# Patient Record
Sex: FEMALE | Race: WHITE | Employment: OTHER | URBAN - METROPOLITAN AREA
[De-identification: names, ages, dates, MRNs, and addresses within clinical notes are randomized per-mention and may not be internally consistent; named-entity substitution may affect disease eponyms.]

---

## 2017-02-01 ENCOUNTER — TELEPHONE (OUTPATIENT)
Dept: INFECTIOUS DISEASES | Age: 57
End: 2017-02-01

## 2017-02-15 ENCOUNTER — TELEPHONE (OUTPATIENT)
Dept: INFECTIOUS DISEASES | Age: 57
End: 2017-02-15

## 2017-02-17 PROBLEM — M47.816 SPONDYLOSIS OF LUMBAR REGION WITHOUT MYELOPATHY OR RADICULOPATHY: Status: ACTIVE | Noted: 2017-02-17

## 2017-03-06 ENCOUNTER — HOSPITAL ENCOUNTER (OUTPATIENT)
Dept: MRI IMAGING | Age: 57
Discharge: HOME | End: 2017-03-06
Payer: COMMERCIAL

## 2017-03-06 DIAGNOSIS — M75.102 TEAR OF LEFT ROTATOR CUFF, UNSPECIFIED TEAR EXTENT: ICD-10-CM

## 2017-03-06 PROCEDURE — 73221 MRI JOINT UPR EXTREM W/O DYE: CPT

## 2017-03-17 ENCOUNTER — TELEPHONE (OUTPATIENT)
Dept: INFECTIOUS DISEASES | Age: 57
End: 2017-03-17

## 2017-03-28 ENCOUNTER — TELEPHONE (OUTPATIENT)
Dept: INFECTIOUS DISEASES | Age: 57
End: 2017-03-28

## 2017-03-29 PROBLEM — M75.42 IMPINGEMENT SYNDROME OF LEFT SHOULDER: Status: ACTIVE | Noted: 2017-03-29

## 2017-04-05 ENCOUNTER — TELEPHONE (OUTPATIENT)
Dept: INFECTIOUS DISEASES | Age: 57
End: 2017-04-05

## 2017-04-11 ENCOUNTER — OFFICE VISIT (OUTPATIENT)
Dept: INFECTIOUS DISEASES | Age: 57
End: 2017-04-11

## 2017-04-11 VITALS
SYSTOLIC BLOOD PRESSURE: 143 MMHG | HEART RATE: 86 BPM | WEIGHT: 261.8 LBS | BODY MASS INDEX: 46.39 KG/M2 | RESPIRATION RATE: 18 BRPM | TEMPERATURE: 98.6 F | DIASTOLIC BLOOD PRESSURE: 92 MMHG | HEIGHT: 63 IN

## 2017-04-11 DIAGNOSIS — Z22.322 MRSA (METHICILLIN RESISTANT STAPH AUREUS) CULTURE POSITIVE: ICD-10-CM

## 2017-04-11 DIAGNOSIS — M86.621 CHRONIC OSTEOMYELITIS OF RIGHT HUMERUS (HCC): Primary | ICD-10-CM

## 2017-04-11 PROCEDURE — 99213 OFFICE O/P EST LOW 20 MIN: CPT | Performed by: INTERNAL MEDICINE

## 2017-04-11 RX ORDER — AMITRIPTYLINE HYDROCHLORIDE 25 MG/1
25 TABLET, FILM COATED ORAL NIGHTLY PRN
COMMUNITY

## 2017-04-11 RX ORDER — MINOCYCLINE HYDROCHLORIDE 100 MG/1
100 TABLET ORAL 2 TIMES DAILY
Qty: 60 TABLET | Refills: 6 | Status: SHIPPED | OUTPATIENT
Start: 2017-04-11 | End: 2017-05-11

## 2017-04-11 ASSESSMENT — ENCOUNTER SYMPTOMS: RESPIRATORY NEGATIVE: 1

## 2017-05-08 ENCOUNTER — TELEPHONE (OUTPATIENT)
Dept: INFECTIOUS DISEASES | Age: 57
End: 2017-05-08

## 2017-05-09 ENCOUNTER — TELEPHONE (OUTPATIENT)
Dept: INFECTIOUS DISEASES | Age: 57
End: 2017-05-09

## 2019-01-05 ENCOUNTER — HOSPITAL ENCOUNTER (OUTPATIENT)
Dept: MRI IMAGING | Age: 59
Discharge: HOME | End: 2019-01-07
Payer: MEDICARE

## 2019-01-05 DIAGNOSIS — G43.909 MIGRAINE WITHOUT STATUS MIGRAINOSUS, NOT INTRACTABLE, UNSPECIFIED MIGRAINE TYPE: ICD-10-CM

## 2019-01-05 PROCEDURE — 70551 MRI BRAIN STEM W/O DYE: CPT

## 2023-02-20 LAB
ALANINE AMINOTRANSFERASE (SGPT) (U/L) IN SER/PLAS: 16 U/L (ref 7–45)
ALBUMIN (G/DL) IN SER/PLAS: 4 G/DL (ref 3.4–5)
ALKALINE PHOSPHATASE (U/L) IN SER/PLAS: 81 U/L (ref 33–136)
ANION GAP IN SER/PLAS: 11 MMOL/L (ref 10–20)
APPEARANCE, URINE: CLEAR
ASPARTATE AMINOTRANSFERASE (SGOT) (U/L) IN SER/PLAS: 14 U/L (ref 9–39)
BASOPHILS (10*3/UL) IN BLOOD BY AUTOMATED COUNT: 0.03 X10E9/L (ref 0–0.1)
BASOPHILS/100 LEUKOCYTES IN BLOOD BY AUTOMATED COUNT: 0.4 % (ref 0–2)
BILIRUBIN TOTAL (MG/DL) IN SER/PLAS: 0.5 MG/DL (ref 0–1.2)
BILIRUBIN, URINE: NEGATIVE
BLOOD, URINE: NEGATIVE
CALCIDIOL (25 OH VITAMIN D3) (NG/ML) IN SER/PLAS: 62 NG/ML
CALCIUM (MG/DL) IN SER/PLAS: 9 MG/DL (ref 8.6–10.3)
CARBON DIOXIDE, TOTAL (MMOL/L) IN SER/PLAS: 30 MMOL/L (ref 21–32)
CHLORIDE (MMOL/L) IN SER/PLAS: 105 MMOL/L (ref 98–107)
CHOLESTEROL (MG/DL) IN SER/PLAS: 142 MG/DL (ref 0–199)
CHOLESTEROL IN HDL (MG/DL) IN SER/PLAS: 46.4 MG/DL
CHOLESTEROL/HDL RATIO: 3.1
COLOR, URINE: YELLOW
CREATININE (MG/DL) IN SER/PLAS: 0.59 MG/DL (ref 0.5–1.05)
EOSINOPHILS (10*3/UL) IN BLOOD BY AUTOMATED COUNT: 0.08 X10E9/L (ref 0–0.7)
EOSINOPHILS/100 LEUKOCYTES IN BLOOD BY AUTOMATED COUNT: 1.2 % (ref 0–6)
ERYTHROCYTE DISTRIBUTION WIDTH (RATIO) BY AUTOMATED COUNT: 12.7 % (ref 11.5–14.5)
ERYTHROCYTE MEAN CORPUSCULAR HEMOGLOBIN CONCENTRATION (G/DL) BY AUTOMATED: 32.3 G/DL (ref 32–36)
ERYTHROCYTE MEAN CORPUSCULAR VOLUME (FL) BY AUTOMATED COUNT: 97 FL (ref 80–100)
ERYTHROCYTES (10*6/UL) IN BLOOD BY AUTOMATED COUNT: 4.51 X10E12/L (ref 4–5.2)
ESTIMATED AVERAGE GLUCOSE FOR HBA1C: 94 MG/DL
GFR FEMALE: >90 ML/MIN/1.73M2
GLUCOSE (MG/DL) IN SER/PLAS: 70 MG/DL (ref 74–99)
GLUCOSE, URINE: NEGATIVE MG/DL
HEMATOCRIT (%) IN BLOOD BY AUTOMATED COUNT: 43.9 % (ref 36–46)
HEMOGLOBIN (G/DL) IN BLOOD: 14.2 G/DL (ref 12–16)
HEMOGLOBIN A1C/HEMOGLOBIN TOTAL IN BLOOD: 4.9 %
IMMATURE GRANULOCYTES/100 LEUKOCYTES IN BLOOD BY AUTOMATED COUNT: 0.1 % (ref 0–0.9)
KETONES, URINE: NEGATIVE MG/DL
LDL: 71 MG/DL (ref 0–99)
LEUKOCYTE ESTERASE, URINE: NEGATIVE
LEUKOCYTES (10*3/UL) IN BLOOD BY AUTOMATED COUNT: 6.9 X10E9/L (ref 4.4–11.3)
LYMPHOCYTES (10*3/UL) IN BLOOD BY AUTOMATED COUNT: 2.36 X10E9/L (ref 1.2–4.8)
LYMPHOCYTES/100 LEUKOCYTES IN BLOOD BY AUTOMATED COUNT: 34.4 % (ref 13–44)
MAGNESIUM (MG/DL) IN SER/PLAS: 1.98 MG/DL (ref 1.6–2.4)
MONOCYTES (10*3/UL) IN BLOOD BY AUTOMATED COUNT: 0.48 X10E9/L (ref 0.1–1)
MONOCYTES/100 LEUKOCYTES IN BLOOD BY AUTOMATED COUNT: 7 % (ref 2–10)
NEUTROPHILS (10*3/UL) IN BLOOD BY AUTOMATED COUNT: 3.91 X10E9/L (ref 1.2–7.7)
NEUTROPHILS/100 LEUKOCYTES IN BLOOD BY AUTOMATED COUNT: 56.9 % (ref 40–80)
NITRITE, URINE: NEGATIVE
PH, URINE: 7 (ref 5–8)
PLATELETS (10*3/UL) IN BLOOD AUTOMATED COUNT: 220 X10E9/L (ref 150–450)
POTASSIUM (MMOL/L) IN SER/PLAS: 4 MMOL/L (ref 3.5–5.3)
PROTEIN TOTAL: 6.4 G/DL (ref 6.4–8.2)
PROTEIN, URINE: NEGATIVE MG/DL
SODIUM (MMOL/L) IN SER/PLAS: 142 MMOL/L (ref 136–145)
SPECIFIC GRAVITY, URINE: 1.01 (ref 1–1.03)
THYROTROPIN (MIU/L) IN SER/PLAS BY DETECTION LIMIT <= 0.05 MIU/L: 1.36 MIU/L (ref 0.44–3.98)
TRIGLYCERIDE (MG/DL) IN SER/PLAS: 125 MG/DL (ref 0–149)
UREA NITROGEN (MG/DL) IN SER/PLAS: 13 MG/DL (ref 6–23)
UROBILINOGEN, URINE: <2 MG/DL (ref 0–1.9)
VLDL: 25 MG/DL (ref 0–40)

## 2023-03-02 LAB
AMPHETAMINE (PRESENCE) IN URINE BY SCREEN METHOD: ABNORMAL
BARBITURATES PRESENCE IN URINE BY SCREEN METHOD: ABNORMAL
BENZODIAZEPINE (PRESENCE) IN URINE BY SCREEN METHOD: ABNORMAL
CANNABINOIDS IN URINE BY SCREEN METHOD: ABNORMAL
COCAINE (PRESENCE) IN URINE BY SCREEN METHOD: ABNORMAL
DRUG SCREEN COMMENT URINE: ABNORMAL
FENTANYL URINE: ABNORMAL
METHADONE (PRESENCE) IN URINE BY SCREEN METHOD: ABNORMAL
OPIATES (PRESENCE) IN URINE BY SCREEN METHOD: ABNORMAL
OXYCODONE (PRESENCE) IN URINE BY SCREEN METHOD: ABNORMAL
PHENCYCLIDINE (PRESENCE) IN URINE BY SCREEN METHOD: ABNORMAL

## 2023-03-07 LAB
6-ACETYLMORPHINE: <25 NG/ML
CODEINE: <50 NG/ML
HYDROCODONE: <25 NG/ML
HYDROMORPHONE: <25 NG/ML
MORPHINE URINE: <50 NG/ML
NORHYDROCODONE: <25 NG/ML
NOROXYCODONE: 510 NG/ML
OXYCODONE: 123 NG/ML
OXYMORPHONE: 26 NG/ML

## 2023-03-17 DIAGNOSIS — R52 PAIN: Primary | ICD-10-CM

## 2023-03-17 RX ORDER — LIDOCAINE 50 MG/G
PATCH TOPICAL
COMMUNITY
End: 2023-03-17 | Stop reason: SDUPTHER

## 2023-03-21 RX ORDER — LIDOCAINE 50 MG/G
PATCH TOPICAL
Qty: 30 PATCH | Refills: 1 | Status: SHIPPED | OUTPATIENT
Start: 2023-03-21 | End: 2023-03-24 | Stop reason: SDUPTHER

## 2023-03-24 DIAGNOSIS — R52 PAIN: ICD-10-CM

## 2023-03-26 RX ORDER — LIDOCAINE 50 MG/G
PATCH TOPICAL
Qty: 30 PATCH | Refills: 1 | Status: SHIPPED | OUTPATIENT
Start: 2023-03-26

## 2023-03-31 DIAGNOSIS — G62.9 NEUROPATHY: Primary | ICD-10-CM

## 2023-04-03 RX ORDER — GABAPENTIN 800 MG/1
TABLET ORAL
Qty: 270 TABLET | Refills: 3 | Status: SHIPPED | OUTPATIENT
Start: 2023-04-03 | End: 2024-02-12

## 2023-04-21 DIAGNOSIS — G89.29 OTHER CHRONIC PAIN: Primary | ICD-10-CM

## 2023-04-21 RX ORDER — OXYCODONE AND ACETAMINOPHEN 5; 325 MG/1; MG/1
1 TABLET ORAL EVERY 6 HOURS PRN
COMMUNITY
End: 2023-04-22 | Stop reason: DRUGHIGH

## 2023-04-21 RX ORDER — OXYCODONE AND ACETAMINOPHEN 5; 325 MG/1; MG/1
1 TABLET ORAL EVERY 6 HOURS PRN
Qty: 180 TABLET | Refills: 0 | Status: CANCELLED | OUTPATIENT
Start: 2023-04-21 | End: 2023-05-21

## 2023-04-22 RX ORDER — OXYCODONE AND ACETAMINOPHEN 5; 325 MG/1; MG/1
1 TABLET ORAL EVERY 4 HOURS PRN
Qty: 180 TABLET | Refills: 0 | Status: SHIPPED | OUTPATIENT
Start: 2023-04-22 | End: 2023-05-15 | Stop reason: SDUPTHER

## 2023-05-15 DIAGNOSIS — G89.29 OTHER CHRONIC PAIN: ICD-10-CM

## 2023-05-15 RX ORDER — OXYCODONE AND ACETAMINOPHEN 5; 325 MG/1; MG/1
1 TABLET ORAL EVERY 4 HOURS PRN
Qty: 180 TABLET | Refills: 0 | Status: SHIPPED | OUTPATIENT
Start: 2023-05-22 | End: 2023-06-12 | Stop reason: SDUPTHER

## 2023-06-05 ENCOUNTER — OFFICE VISIT (OUTPATIENT)
Dept: PRIMARY CARE | Facility: CLINIC | Age: 63
End: 2023-06-05
Payer: MEDICARE

## 2023-06-05 VITALS
BODY MASS INDEX: 24.36 KG/M2 | SYSTOLIC BLOOD PRESSURE: 152 MMHG | HEART RATE: 70 BPM | DIASTOLIC BLOOD PRESSURE: 84 MMHG | HEIGHT: 63 IN | OXYGEN SATURATION: 97 % | WEIGHT: 137.5 LBS

## 2023-06-05 DIAGNOSIS — E11.9 TYPE 2 DIABETES MELLITUS WITHOUT COMPLICATION, WITHOUT LONG-TERM CURRENT USE OF INSULIN (MULTI): ICD-10-CM

## 2023-06-05 DIAGNOSIS — G25.81 RLS (RESTLESS LEGS SYNDROME): ICD-10-CM

## 2023-06-05 DIAGNOSIS — I25.10 CORONARY ARTERY DISEASE INVOLVING NATIVE HEART WITHOUT ANGINA PECTORIS, UNSPECIFIED VESSEL OR LESION TYPE: ICD-10-CM

## 2023-06-05 DIAGNOSIS — F32.A DEPRESSION, UNSPECIFIED DEPRESSION TYPE: ICD-10-CM

## 2023-06-05 DIAGNOSIS — G89.29 OTHER CHRONIC PAIN: Primary | ICD-10-CM

## 2023-06-05 DIAGNOSIS — E55.9 VITAMIN D DEFICIENCY: ICD-10-CM

## 2023-06-05 PROCEDURE — 3044F HG A1C LEVEL LT 7.0%: CPT | Performed by: FAMILY MEDICINE

## 2023-06-05 PROCEDURE — 3079F DIAST BP 80-89 MM HG: CPT | Performed by: FAMILY MEDICINE

## 2023-06-05 PROCEDURE — 99214 OFFICE O/P EST MOD 30 MIN: CPT | Performed by: FAMILY MEDICINE

## 2023-06-05 PROCEDURE — 3077F SYST BP >= 140 MM HG: CPT | Performed by: FAMILY MEDICINE

## 2023-06-05 PROCEDURE — 3008F BODY MASS INDEX DOCD: CPT | Performed by: FAMILY MEDICINE

## 2023-06-05 RX ORDER — DULOXETIN HYDROCHLORIDE 60 MG/1
60 CAPSULE, DELAYED RELEASE ORAL DAILY
Qty: 90 CAPSULE | Refills: 1 | Status: SHIPPED | OUTPATIENT
Start: 2023-06-05 | End: 2023-10-17

## 2023-06-05 NOTE — PATIENT INSTRUCTIONS
Patient Discussion/Summary     Preventative visit next due in 2024.. We will do Medicare wellness later in the year.  March 2024 is next controlled substance contract and UDS.    -------  Screen for breast cancer, colon cancer. (Had hysterectomy around 2001). Had normal mammogram March 2022. -->>  Be sure to schedule your mammogram.    Patient had in the home colon cancer screening done summer 2021. Was told it was negative. Next one is due around summer 2024.     Hepatitis C screening was negative January 2022.     Need for immunizations. Patient is UTD on annual flu shot and had Tdap February 2022. Due for Shingles immunizations. UTD on coronavirus series, due for bivalent booster. -->> check with pharmacy to keep up-to-date on immunizations.  -------  Cardiac CT scoring was 113, over read was negative. -->> We will plan to aggressively manage risk factors including LDL below 70.      H/o Obesity, BMI 24, weight about the same as last time. Down from maximum of 283 February 20. Is post gastric bypass May 2021. -->> Keep up the most excellent work.     Chronic pain, right arm, low back, left shoulder, hip pain. R arm post many surgeries and injuries. Is now seeing pain management/Dr. Meza. Pain mgt asked us to continue prescribe Percocet while they work on other modalities. Pain management recently tried a new muscle relaxer for patient, she feels its very sedating, so at least it is helping with sleep. They are also talking about retrying a series of injections, starting in December also are discussing a nerve ablation. Is currently on 6 Percocet a day. Failed Voltaren gel (ineffective). Is on duloxetine, 60mg daily, looks like it was refilled months ago at the 30 mg dose. Finding it at least somewhat helpful. -->> Follow-up up with pain management as planned.  Also doing referral to [facility].  Also refilling duloxetine at the higher dose today.    New annual labs reviewed: Next annual labs  will be around February 2024. Around end of August or early September we will recheck vitamin D.  - Vitamin D deficiency, 62, was 81, 75, 57, 56, 67, 54, 49, 52, 42, 51, 25, 21, goal is . Post gastric bypass. Patient is currently on 100,000 units weekly, but had missed some for a while, is now back on her regular dose.   - type 2 diabetes, A1c is 4.9, was 5.2, 5.1, 6.1, 5.9, 6.3, 6.9, 5.8. Patient does know she has significantly cut back on sugar. No longer on Metformin. Will recheck A1c in 12 months  - Drug therapy -->> Plan to recheck annual labs around January 2022.   - Hyperlipidemia, HDL 46, was 41, 44, 40, 42, 38, 45, 41.3, goal is 45 or more. LDL or bad cholesterol, is 71, was 73, 137, 137, 136, 117, 128, 107, your goal is now 70 or less. -->> Keep up the good work. Patient is now post gastric bypass surgery.  -Dehydrated -->> drink more water especially before labs.     Depression, in general does well with sertraline. -->> Will refill as needed.     RLS, doing much better now on 1 mg ropinirole TID. Does feel it is helping more with the 3 times daily versus the bedtime only dosing. -->> We will refill as needed.     h/o CAD, abnormal EKG, PVCs.  Will be seeing Dr. Santana starting next month.  -->> f/up as planned.     Patient did quit smoking January 5, 2019. -->> Keep up the most excellent work.     - patient will return in about 3 months for CS RF and lab review.  -Return here about a week before the appointment to get vitamin D level drawn.

## 2023-06-05 NOTE — PROGRESS NOTES
Subjective   Patient ID: Bridget Hannon is a 62 y.o. female who presents for 3 Month CSRefill FU (Pt in today for her routine 3 month controlled substance refill FU).      Review of Systems  Denies N/V/D/C, no HA/S/V, denies rashes/lesions, no CP/SOB. Denies fevers/chills.  All other systems were negative.    Objective   Physical Exam  Gen: NAD  eyes: EOMI, PERRLA  ENT: hearing grossly intact, no nasal discharge  resp: CTABL, without R/R  heart: RRR without MRG  GI: abd: S/ND/NT, BS+  lymph: no axillary, cervical, supraclavicular lymphadenopathy noted   MS: gait grossly WNL,  derm: no rashes or lesions noted  neuro: CN II-XII grossly intact  psych: A&Ox3    Assessment/Plan   Diagnoses and all orders for this visit:  Other chronic pain  -     Referral to [facility]; Future  -     DULoxetine (Cymbalta) 60 mg DR capsule; Take 1 capsule (60 mg) by mouth once daily. Do not crush or chew.  Vitamin D deficiency  -     Vitamin D 25-Hydroxy,Total; Future  Type 2 diabetes mellitus without complication, without long-term current use of insulin (CMS/[facility])  Depression, unspecified depression type  RLS (restless legs syndrome)  Coronary artery disease involving native heart without angina pectoris, unspecified vessel or lesion type         Patient Discussion/Summary     Preventative visit next due in 2024.. We will do Medicare wellness later in the year.  March 2024 is next controlled substance contract and UDS.    -------  Screen for breast cancer, colon cancer. (Had hysterectomy around 2001). Had normal mammogram March 2022. -->>  Be sure to schedule your mammogram.    Patient had in the home colon cancer screening done summer 2021. Was told it was negative. Next one is due around summer 2024.     Hepatitis C screening was negative January 2022.     Need for immunizations. Patient is UTD on annual flu shot and had Tdap February 2022. Due for Shingles immunizations. UTD on coronavirus series, due for bivalent  booster. -->> check with pharmacy to keep up-to-date on immunizations.  -------  Cardiac CT scoring was 113, over read was negative. -->> We will plan to aggressively manage risk factors including LDL below 70.      H/o Obesity, BMI 24, weight about the same as last time. Down from maximum of 283 February 20. Is post gastric bypass May 2021. -->> Keep up the most excellent work.     Chronic pain, right arm, low back, left shoulder, hip pain. R arm post many surgeries and injuries. Is now seeing pain management/Dr. Meza. Pain mgt asked us to continue prescribe Percocet while they work on other modalities. Pain management recently tried a new muscle relaxer for patient, she feels its very sedating, so at least it is helping with sleep. They are also talking about retrying a series of injections, starting in December also are discussing a nerve ablation. Is currently on 6 Percocet a day. Failed Voltaren gel (ineffective). Is on duloxetine, 60mg daily, looks like it was refilled months ago at the 30 mg dose. Finding it at least somewhat helpful. -->> Follow-up up with pain management as planned.  Also doing referral to [facility].  Also refilling duloxetine at the higher dose today.    New annual labs reviewed: Next annual labs will be around February 2024. Around end of August or early September we will recheck vitamin D.  - Vitamin D deficiency, 62, was 81, 75, 57, 56, 67, 54, 49, 52, 42, 51, 25, 21, goal is . Post gastric bypass. Patient is currently on 100,000 units weekly, but had missed some for a while, is now back on her regular dose.   - type 2 diabetes, A1c is 4.9, was 5.2, 5.1, 6.1, 5.9, 6.3, 6.9, 5.8. Patient does know she has significantly cut back on sugar. No longer on Metformin. Will recheck A1c in 12 months  - Drug therapy -->> Plan to recheck annual labs around January 2022.   - Hyperlipidemia, HDL 46, was 41, 44, 40, 42, 38, 45, 41.3, goal is 45 or more. LDL or bad cholesterol,  is 71, was 73, 137, 137, 136, 117, 128, 107, your goal is now 70 or less. -->> Keep up the good work. Patient is now post gastric bypass surgery.  -Dehydrated -->> drink more water especially before labs.     Depression, in general does well with sertraline. -->> Will refill as needed.     RLS, doing much better now on 1 mg ropinirole TID. Does feel it is helping more with the 3 times daily versus the bedtime only dosing. -->> We will refill as needed.     h/o CAD, abnormal EKG, PVCs.  Will be seeing Dr. Santana starting next month.  -->> f/up as planned.     Patient did quit smoking January 5, 2019. -->> Keep up the most excellent work.     - patient will return in about 3 months for CS RF and lab review.  - Return here about a week before the appointment to get vitamin D level drawn.

## 2023-06-12 DIAGNOSIS — G89.29 OTHER CHRONIC PAIN: ICD-10-CM

## 2023-06-16 RX ORDER — OXYCODONE AND ACETAMINOPHEN 5; 325 MG/1; MG/1
1 TABLET ORAL EVERY 4 HOURS PRN
Qty: 180 TABLET | Refills: 0 | Status: SHIPPED | OUTPATIENT
Start: 2023-06-21 | End: 2023-07-11 | Stop reason: SDUPTHER

## 2023-07-08 DIAGNOSIS — I10 HYPERTENSION, UNSPECIFIED TYPE: Primary | ICD-10-CM

## 2023-07-10 RX ORDER — LOSARTAN POTASSIUM 50 MG/1
TABLET ORAL
Qty: 90 TABLET | Refills: 1 | Status: SHIPPED | OUTPATIENT
Start: 2023-07-10 | End: 2023-10-05

## 2023-07-11 ENCOUNTER — OFFICE VISIT (OUTPATIENT)
Dept: PRIMARY CARE | Facility: CLINIC | Age: 63
End: 2023-07-11
Payer: MEDICARE

## 2023-07-11 VITALS
OXYGEN SATURATION: 95 % | BODY MASS INDEX: 24.63 KG/M2 | DIASTOLIC BLOOD PRESSURE: 87 MMHG | HEART RATE: 78 BPM | SYSTOLIC BLOOD PRESSURE: 146 MMHG | HEIGHT: 63 IN | WEIGHT: 139 LBS

## 2023-07-11 DIAGNOSIS — G89.29 OTHER CHRONIC PAIN: ICD-10-CM

## 2023-07-11 DIAGNOSIS — H10.9 CONJUNCTIVITIS OF BOTH EYES, UNSPECIFIED CONJUNCTIVITIS TYPE: Primary | ICD-10-CM

## 2023-07-11 PROCEDURE — 99214 OFFICE O/P EST MOD 30 MIN: CPT | Performed by: FAMILY MEDICINE

## 2023-07-11 PROCEDURE — 3008F BODY MASS INDEX DOCD: CPT | Performed by: FAMILY MEDICINE

## 2023-07-11 RX ORDER — OXYCODONE AND ACETAMINOPHEN 5; 325 MG/1; MG/1
1 TABLET ORAL EVERY 4 HOURS PRN
Qty: 180 TABLET | Refills: 0 | Status: SHIPPED | OUTPATIENT
Start: 2023-07-21 | End: 2023-08-16 | Stop reason: SDUPTHER

## 2023-07-11 RX ORDER — TOBRAMYCIN 3 MG/ML
SOLUTION/ DROPS OPHTHALMIC
Qty: 5 ML | Refills: 1 | Status: SHIPPED | OUTPATIENT
Start: 2023-07-11

## 2023-07-11 RX ORDER — ERYTHROMYCIN 5 MG/G
OINTMENT OPHTHALMIC
Qty: 3.5 G | Refills: 1 | Status: SHIPPED | OUTPATIENT
Start: 2023-07-11

## 2023-07-11 NOTE — PATIENT INSTRUCTIONS
Bacterial conjunctivitis.  Started about 4 days ago, right eye, next day left eye was involved, is now waking with eyes crusted shut.  Has tried Visine allergy, and it helped a little. -->>  We will prescribe tobramycin eyedrops as well as erythromycin eye ointment.  Ointment can be used in the eyes or on the eyelids.    Management complicated by type 2 diabetes, however it has been very well controlled.    Chronic pain/pain management.  Due soon for refill, will refill Percocet for patient today.        Return in a few months as already scheduled.

## 2023-08-16 DIAGNOSIS — G89.29 OTHER CHRONIC PAIN: ICD-10-CM

## 2023-08-17 RX ORDER — OXYCODONE AND ACETAMINOPHEN 5; 325 MG/1; MG/1
1 TABLET ORAL EVERY 4 HOURS PRN
Qty: 180 TABLET | Refills: 0 | Status: SHIPPED | OUTPATIENT
Start: 2023-08-20 | End: 2023-09-06 | Stop reason: SDUPTHER

## 2023-08-28 ENCOUNTER — CLINICAL SUPPORT (OUTPATIENT)
Dept: PRIMARY CARE | Facility: CLINIC | Age: 63
End: 2023-08-28
Payer: MEDICARE

## 2023-08-28 DIAGNOSIS — E55.9 VITAMIN D DEFICIENCY: ICD-10-CM

## 2023-08-28 LAB — CALCIDIOL (25 OH VITAMIN D3) (NG/ML) IN SER/PLAS: 54 NG/ML

## 2023-08-28 PROCEDURE — 82306 VITAMIN D 25 HYDROXY: CPT

## 2023-08-28 NOTE — PROGRESS NOTES
Subjective   Patient ID: Bridget Hannon is a 62 y.o. female who presents for Labs Only (Pt in today for lab draw).    HPI     Review of Systems    Objective   There were no vitals taken for this visit.    Physical Exam    Assessment/Plan

## 2023-09-05 ENCOUNTER — APPOINTMENT (OUTPATIENT)
Dept: PRIMARY CARE | Facility: CLINIC | Age: 63
End: 2023-09-05
Payer: MEDICARE

## 2023-09-06 ENCOUNTER — OFFICE VISIT (OUTPATIENT)
Dept: PRIMARY CARE | Facility: CLINIC | Age: 63
End: 2023-09-06
Payer: MEDICARE

## 2023-09-06 VITALS
OXYGEN SATURATION: 93 % | HEART RATE: 75 BPM | SYSTOLIC BLOOD PRESSURE: 153 MMHG | DIASTOLIC BLOOD PRESSURE: 83 MMHG | WEIGHT: 144 LBS | BODY MASS INDEX: 25.52 KG/M2 | HEIGHT: 63 IN

## 2023-09-06 DIAGNOSIS — G25.81 RLS (RESTLESS LEGS SYNDROME): ICD-10-CM

## 2023-09-06 DIAGNOSIS — E11.9 TYPE 2 DIABETES MELLITUS WITHOUT COMPLICATION, WITHOUT LONG-TERM CURRENT USE OF INSULIN (MULTI): ICD-10-CM

## 2023-09-06 DIAGNOSIS — E55.9 VITAMIN D DEFICIENCY: Primary | ICD-10-CM

## 2023-09-06 DIAGNOSIS — F32.A DEPRESSION, UNSPECIFIED DEPRESSION TYPE: ICD-10-CM

## 2023-09-06 DIAGNOSIS — I25.10 CORONARY ARTERY DISEASE INVOLVING NATIVE HEART WITHOUT ANGINA PECTORIS, UNSPECIFIED VESSEL OR LESION TYPE: ICD-10-CM

## 2023-09-06 DIAGNOSIS — G89.29 OTHER CHRONIC PAIN: ICD-10-CM

## 2023-09-06 PROCEDURE — 99214 OFFICE O/P EST MOD 30 MIN: CPT | Performed by: FAMILY MEDICINE

## 2023-09-06 PROCEDURE — 4010F ACE/ARB THERAPY RXD/TAKEN: CPT | Performed by: FAMILY MEDICINE

## 2023-09-06 PROCEDURE — 3077F SYST BP >= 140 MM HG: CPT | Performed by: FAMILY MEDICINE

## 2023-09-06 PROCEDURE — 3044F HG A1C LEVEL LT 7.0%: CPT | Performed by: FAMILY MEDICINE

## 2023-09-06 PROCEDURE — 3079F DIAST BP 80-89 MM HG: CPT | Performed by: FAMILY MEDICINE

## 2023-09-06 RX ORDER — OXYCODONE AND ACETAMINOPHEN 5; 325 MG/1; MG/1
1 TABLET ORAL EVERY 4 HOURS PRN
Qty: 180 TABLET | Refills: 0 | Status: SHIPPED | OUTPATIENT
Start: 2023-09-19 | End: 2023-10-13 | Stop reason: SDUPTHER

## 2023-09-06 NOTE — PROGRESS NOTES
Subjective   Patient ID: Bridget Hannon is a 62 y.o. female who presents for Lab Review FU (Pt in today for routine lab review FU).      Review of Systems  Denies N/V/D/C, no HA/S/V, denies rashes/lesions, no CP/SOB. Denies fevers/chills.  All other systems were negative.    Objective   Physical Exam  Gen: NAD  eyes: EOMI, PERRLA  ENT: hearing grossly intact, no nasal discharge  resp: CTABL, without R/R  heart: RRR without MRG  GI: abd: S/ND/NT, BS+  lymph: no axillary, cervical, supraclavicular lymphadenopathy noted   MS: gait grossly WNL,  derm: no rashes or lesions noted  neuro: CN II-XII grossly intact  psych: A&Ox3    Assessment/Plan   Diagnoses and all orders for this visit:  Vitamin D deficiency  Type 2 diabetes mellitus without complication, without long-term current use of insulin (CMS/[facility])  Coronary artery disease involving native heart without angina pectoris, unspecified vessel or lesion type  Depression, unspecified depression type  RLS (restless legs syndrome)  Other chronic pain         Patient Discussion/Summary     Preventative visit next due in 2024.. We will do Medicare wellness later in the year.  March 2024 is next controlled substance contract and UDS.    -------  Screen for breast cancer, colon cancer. (Had hysterectomy around 2001). Had normal mammogram March 2022. -->>  Be sure to schedule your mammogram.    Patient had in the home colon cancer screening done summer 2021. Was told it was negative. Next one is due around summer 2024.     Hepatitis C screening was negative January 2022.     Need for immunizations. Patient is UTD on annual flu shot and had Tdap February 2022. Due for Shingles immunizations. UTD on coronavirus series, due for bivalent booster. -->> check with pharmacy to keep up-to-date on immunizations.    -------  Cardiac CT scoring was 113, over read was negative. -->> We will plan to aggressively manage risk factors including LDL below 70.      H/o Obesity, BMI 25, up  6 lb since last appt here. Down from maximum of 283 February 2020. Is post gastric bypass May 2021. -->> Keep up the most excellent work.     Chronic pain, right arm, low back, left shoulder, hip pain. R arm post many surgeries and injuries.  Had yet another surgery a month ago.  Had a revision of hardware, plate had broken. Is now seeing pain management/Dr. Meza. Pain mgt asked us to continue prescribe Percocet while they work on other modalities.  Has not seen them recently due to financial concerns.  Is currently on 6 Percocet a day. Failed Voltaren gel (ineffective). Is on duloxetine, 60mg daily, which seems to be at least a little better than the 30 mg. Finding it at least somewhat helpful. -->> Follow-up up with pain management as planned.  Has a referral to [facility].  Next controlled substance agreement and UDS is due around March 1, 2024.    New vit D reviewed: Next annual labs will be around February 2024.   - Vitamin D deficiency, 54, was 62, 81, 75, 57, 56, 67, 54, 49, 52, 42, 51, 25, 21, goal is . Post gastric bypass. Patient is currently on 100,000 units weekly,  -->> Increase to 3 pills a week for total of 150,000 units weekly.  We will recheck with next annual labs in about 6 months.    Regarding previous labs:  - type 2 diabetes, A1c is 4.9, was 5.2, 5.1, 6.1, 5.9, 6.3, 6.9, 5.8. Patient does know she has significantly cut back on sugar. No longer on Metformin. Will recheck A1c in 12 months  - Drug therapy -->> Plan to recheck annual labs around January 2022.   - Hyperlipidemia, HDL 46, was 41, 44, 40, 42, 38, 45, 41.3, goal is 45 or more. LDL or bad cholesterol, is 71, was 73, 137, 137, 136, 117, 128, 107, your goal is now 70 or less. -->> Keep up the good work. Patient is now post gastric bypass surgery.  -Dehydrated -->> drink more water especially before labs.     Depression, in general does well with sertraline. -->> Will refill as needed.     RLS, doing well 1 mg  ropinirole TID. -->> We will refill as needed.     h/o CAD, abnormal EKG, PVCs.  Saw Dr. Sanford.  Looks like he told patient does not need to follow-up unless she has any more problems.      Patient did quit smoking January 5, 2019. -->> Keep up the most excellent work.     - patient will return in about 3 months for CS RF and lab review.

## 2023-09-06 NOTE — PATIENT INSTRUCTIONS
Patient Discussion/Summary     Preventative visit next due in 2024.. We will do Medicare wellness later in the year.  March 2024 is next controlled substance contract and UDS.    -------  Screen for breast cancer, colon cancer. (Had hysterectomy around 2001). Had normal mammogram March 2022. -->>  Be sure to schedule your mammogram.    Patient had in the home colon cancer screening done summer 2021. Was told it was negative. Next one is due around summer 2024.     Hepatitis C screening was negative January 2022.     Need for immunizations. Patient is UTD on annual flu shot and had Tdap February 2022. Due for Shingles immunizations. UTD on coronavirus series, due for bivalent booster. -->> check with pharmacy to keep up-to-date on immunizations.  -------  Cardiac CT scoring was 113, over read was negative. -->> We will plan to aggressively manage risk factors including LDL below 70.      H/o Obesity, BMI 25, up 6 lb since last appt here. Down from maximum of 283 February 2020. Is post gastric bypass May 2021. -->> Keep up the most excellent work.     Chronic pain, right arm, low back, left shoulder, hip pain. R arm post many surgeries and injuries.  Had yet another surgery a month ago.  Had a revision of hardware, plate had broken. Is now seeing pain management/Dr. Meza. Pain mgt asked us to continue prescribe Percocet while they work on other modalities.  Has not seen them recently due to financial concerns.  Is currently on 6 Percocet a day. Failed Voltaren gel (ineffective). Is on duloxetine, 60mg daily, which seems to be at least a little better than the 30 mg. Finding it at least somewhat helpful. -->> Follow-up up with pain management as planned.  Has a referral to [facility].  Next controlled substance agreement and UDS is due around March 1, 2024.    New vit D reviewed: Next annual labs will be around February 2024.   - Vitamin D deficiency, 54, was 62, 81, 75, 57, 56, 67, 54, 49, 52, 42, 51,  25, 21, goal is . Post gastric bypass. Patient is currently on 100,000 units weekly,  -->> Increase to 3 pills a week for total of 150,000 units weekly.  We will recheck with next annual labs in about 6 months.    Regarding previous labs:  - type 2 diabetes, A1c is 4.9, was 5.2, 5.1, 6.1, 5.9, 6.3, 6.9, 5.8. Patient does know she has significantly cut back on sugar. No longer on Metformin. Will recheck A1c in 12 months  - Drug therapy -->> Plan to recheck annual labs around January 2022.   - Hyperlipidemia, HDL 46, was 41, 44, 40, 42, 38, 45, 41.3, goal is 45 or more. LDL or bad cholesterol, is 71, was 73, 137, 137, 136, 117, 128, 107, your goal is now 70 or less. -->> Keep up the good work. Patient is now post gastric bypass surgery.  -Dehydrated -->> drink more water especially before labs.     Depression, in general does well with sertraline. -->> Will refill as needed.     RLS, doing well 1 mg ropinirole TID. -->> We will refill as needed.     h/o CAD, abnormal EKG, PVCs.  Saw Dr. Sanford.  Looks like he told patient does not need to follow-up unless she has any more problems.      Patient did quit smoking January 5, 2019. -->> Keep up the most excellent work.     - patient will return in about 3 months for  RF and lab review.

## 2023-09-24 DIAGNOSIS — F32.A DEPRESSION, UNSPECIFIED DEPRESSION TYPE: Primary | ICD-10-CM

## 2023-09-25 RX ORDER — SERTRALINE HYDROCHLORIDE 100 MG/1
100 TABLET, FILM COATED ORAL DAILY
Qty: 90 TABLET | Refills: 3 | Status: SHIPPED | OUTPATIENT
Start: 2023-09-25

## 2023-10-01 PROBLEM — E66.1: Status: ACTIVE | Noted: 2023-10-01

## 2023-10-01 PROBLEM — G56.02 CARPAL TUNNEL SYNDROME OF LEFT WRIST: Status: ACTIVE | Noted: 2023-10-01

## 2023-10-01 PROBLEM — G89.29 CHRONIC LEFT-SIDED LOW BACK PAIN WITH LEFT-SIDED SCIATICA: Status: ACTIVE | Noted: 2017-05-15

## 2023-10-01 PROBLEM — G47.19 EXCESSIVE DAYTIME SLEEPINESS: Status: ACTIVE | Noted: 2023-10-01

## 2023-10-01 PROBLEM — G47.00 INSOMNIA: Status: ACTIVE | Noted: 2023-10-01

## 2023-10-01 PROBLEM — M25.60 MULTIPLE STIFF JOINTS: Status: ACTIVE | Noted: 2023-10-01

## 2023-10-01 PROBLEM — E78.5 HYPERLIPIDEMIA: Status: ACTIVE | Noted: 2023-10-01

## 2023-10-01 PROBLEM — R94.39 ABNORMAL NUCLEAR STRESS TEST: Status: ACTIVE | Noted: 2023-10-01

## 2023-10-01 PROBLEM — M12.59: Status: ACTIVE | Noted: 2023-10-01

## 2023-10-01 PROBLEM — G47.9 SLEEP DISTURBANCE: Status: ACTIVE | Noted: 2017-05-15

## 2023-10-01 PROBLEM — G43.109 MIGRAINE WITH AURA AND WITHOUT STATUS MIGRAINOSUS, NOT INTRACTABLE: Status: ACTIVE | Noted: 2023-10-01

## 2023-10-01 PROBLEM — G89.29 CHRONIC PAIN: Status: ACTIVE | Noted: 2023-10-01

## 2023-10-01 PROBLEM — M21.929: Status: ACTIVE | Noted: 2023-10-01

## 2023-10-01 PROBLEM — M47.816 SPONDYLOSIS OF LUMBAR REGION WITHOUT MYELOPATHY OR RADICULOPATHY: Status: ACTIVE | Noted: 2017-02-17

## 2023-10-01 PROBLEM — R94.31 ABNORMAL EKG: Status: ACTIVE | Noted: 2023-10-01

## 2023-10-01 PROBLEM — E66.01 PSYCHOLOGICAL FACTORS AFFECTING MORBID OBESITY (MULTI): Status: ACTIVE | Noted: 2023-10-01

## 2023-10-01 PROBLEM — G47.30 SLEEP APNEA: Status: ACTIVE | Noted: 2023-10-01

## 2023-10-01 PROBLEM — M86.431: Status: ACTIVE | Noted: 2023-10-01

## 2023-10-01 PROBLEM — M75.42 IMPINGEMENT SYNDROME OF LEFT SHOULDER: Status: ACTIVE | Noted: 2017-03-29

## 2023-10-01 PROBLEM — M54.9 CHRONIC BACK PAIN: Status: ACTIVE | Noted: 2023-10-01

## 2023-10-01 PROBLEM — G89.29 CHRONIC BACK PAIN: Status: ACTIVE | Noted: 2023-10-01

## 2023-10-01 PROBLEM — K91.2 POSTOPERATIVE MALABSORPTION (HHS-HCC): Status: ACTIVE | Noted: 2023-10-01

## 2023-10-01 PROBLEM — F54 PSYCHOLOGICAL FACTORS AFFECTING MORBID OBESITY (MULTI): Status: ACTIVE | Noted: 2023-10-01

## 2023-10-01 PROBLEM — I10 BENIGN ESSENTIAL HYPERTENSION: Status: ACTIVE | Noted: 2023-10-01

## 2023-10-01 PROBLEM — Z98.84 BARIATRIC SURGERY STATUS: Status: ACTIVE | Noted: 2023-10-01

## 2023-10-01 PROBLEM — Z98.84 S/P GASTRIC BYPASS: Status: ACTIVE | Noted: 2023-10-01

## 2023-10-01 PROBLEM — M54.42 CHRONIC LEFT-SIDED LOW BACK PAIN WITH LEFT-SIDED SCIATICA: Status: ACTIVE | Noted: 2017-05-15

## 2023-10-01 RX ORDER — VALSARTAN 160 MG/1
1 TABLET ORAL DAILY
COMMUNITY
Start: 2017-03-01

## 2023-10-01 RX ORDER — IBUPROFEN 600 MG/1
1 TABLET ORAL EVERY 6 HOURS PRN
COMMUNITY
Start: 2023-08-05

## 2023-10-01 RX ORDER — TRAMADOL HYDROCHLORIDE 50 MG/1
1 TABLET ORAL 2 TIMES DAILY PRN
COMMUNITY
Start: 2017-03-08

## 2023-10-01 RX ORDER — DOXYCYCLINE 100 MG/1
100 CAPSULE ORAL 2 TIMES DAILY
COMMUNITY
Start: 2022-06-15 | End: 2024-01-16 | Stop reason: SDUPTHER

## 2023-10-01 RX ORDER — OMEPRAZOLE 40 MG/1
1 CAPSULE, DELAYED RELEASE ORAL DAILY
COMMUNITY
Start: 2021-05-06

## 2023-10-01 RX ORDER — MELOXICAM 7.5 MG/1
1 TABLET ORAL DAILY PRN
COMMUNITY
Start: 2018-03-27

## 2023-10-01 RX ORDER — ROPINIROLE 1 MG/1
1 TABLET, FILM COATED ORAL 3 TIMES DAILY PRN
COMMUNITY
Start: 2021-07-20 | End: 2023-11-07

## 2023-10-01 RX ORDER — HYDROCHLOROTHIAZIDE 25 MG/1
1 TABLET ORAL DAILY
COMMUNITY
Start: 2017-01-17

## 2023-10-01 RX ORDER — ACETAMINOPHEN 325 MG/1
325 TABLET ORAL EVERY 6 HOURS PRN
COMMUNITY
Start: 2020-01-23

## 2023-10-01 RX ORDER — GABAPENTIN 600 MG/1
TABLET ORAL
COMMUNITY
Start: 2017-03-21 | End: 2024-02-12 | Stop reason: DRUGHIGH

## 2023-10-01 RX ORDER — METHOCARBAMOL 750 MG/1
TABLET, FILM COATED ORAL
COMMUNITY
Start: 2021-12-11

## 2023-10-01 RX ORDER — ALBUTEROL SULFATE 90 UG/1
2 AEROSOL, METERED RESPIRATORY (INHALATION)
COMMUNITY
Start: 2017-12-08

## 2023-10-01 RX ORDER — DICLOFENAC SODIUM 10 MG/G
4 GEL TOPICAL 4 TIMES DAILY
COMMUNITY
Start: 2017-03-22

## 2023-10-01 RX ORDER — CEFTRIAXONE SODIUM 10 G/100ML
INJECTION, POWDER, FOR SOLUTION INTRAVENOUS
COMMUNITY
Start: 2020-01-20 | End: 2024-01-16 | Stop reason: ALTCHOICE

## 2023-10-01 RX ORDER — ASPIRIN 325 MG
2 TABLET, DELAYED RELEASE (ENTERIC COATED) ORAL
COMMUNITY
End: 2024-02-26

## 2023-10-01 RX ORDER — TIZANIDINE 4 MG/1
4 TABLET ORAL NIGHTLY PRN
COMMUNITY
Start: 2023-02-09

## 2023-10-01 RX ORDER — DULOXETIN HYDROCHLORIDE 30 MG/1
1 CAPSULE, DELAYED RELEASE ORAL DAILY
COMMUNITY
Start: 2022-10-21 | End: 2023-10-17 | Stop reason: ALTCHOICE

## 2023-10-01 RX ORDER — AMITRIPTYLINE HYDROCHLORIDE 25 MG/1
25 TABLET, FILM COATED ORAL NIGHTLY PRN
COMMUNITY

## 2023-10-01 RX ORDER — TALC
3 POWDER (GRAM) TOPICAL NIGHTLY PRN
COMMUNITY
Start: 2017-04-25

## 2023-10-01 RX ORDER — ERGOCALCIFEROL 1.25 MG/1
2.5 CAPSULE ORAL
COMMUNITY

## 2023-10-01 RX ORDER — DICLOFENAC SODIUM 100 MG/1
100 TABLET, EXTENDED RELEASE ORAL DAILY PRN
COMMUNITY
Start: 2022-10-27

## 2023-10-01 RX ORDER — DICLOFENAC SODIUM 50 MG/1
TABLET, DELAYED RELEASE ORAL
COMMUNITY
Start: 2019-11-29

## 2023-10-03 ENCOUNTER — APPOINTMENT (OUTPATIENT)
Dept: ORTHOPEDIC SURGERY | Facility: CLINIC | Age: 63
End: 2023-10-03
Payer: MEDICARE

## 2023-10-05 ENCOUNTER — APPOINTMENT (OUTPATIENT)
Dept: OCCUPATIONAL THERAPY | Facility: CLINIC | Age: 63
End: 2023-10-05
Payer: MEDICARE

## 2023-10-05 DIAGNOSIS — I10 HYPERTENSION, UNSPECIFIED TYPE: ICD-10-CM

## 2023-10-05 RX ORDER — LOSARTAN POTASSIUM 50 MG/1
TABLET ORAL
Qty: 100 TABLET | Refills: 2 | Status: SHIPPED | OUTPATIENT
Start: 2023-10-05

## 2023-10-10 ENCOUNTER — APPOINTMENT (OUTPATIENT)
Dept: ORTHOPEDIC SURGERY | Facility: CLINIC | Age: 63
End: 2023-10-10
Payer: MEDICARE

## 2023-10-13 DIAGNOSIS — G89.29 OTHER CHRONIC PAIN: ICD-10-CM

## 2023-10-13 RX ORDER — OXYCODONE AND ACETAMINOPHEN 5; 325 MG/1; MG/1
1 TABLET ORAL EVERY 4 HOURS PRN
Qty: 180 TABLET | Refills: 0 | Status: SHIPPED | OUTPATIENT
Start: 2023-10-19 | End: 2023-11-17 | Stop reason: SDUPTHER

## 2023-10-16 DIAGNOSIS — G89.29 OTHER CHRONIC PAIN: ICD-10-CM

## 2023-10-17 ENCOUNTER — APPOINTMENT (OUTPATIENT)
Dept: ORTHOPEDIC SURGERY | Facility: CLINIC | Age: 63
End: 2023-10-17
Payer: MEDICARE

## 2023-10-17 RX ORDER — DULOXETIN HYDROCHLORIDE 60 MG/1
60 CAPSULE, DELAYED RELEASE ORAL DAILY
Qty: 90 CAPSULE | Refills: 3 | Status: SHIPPED | OUTPATIENT
Start: 2023-10-17

## 2023-10-20 ENCOUNTER — OFFICE VISIT (OUTPATIENT)
Dept: ORTHOPEDIC SURGERY | Facility: CLINIC | Age: 63
End: 2023-10-20
Payer: MEDICARE

## 2023-10-20 DIAGNOSIS — M65.30 TRIGGER FINGER, ACQUIRED: Primary | ICD-10-CM

## 2023-10-20 PROCEDURE — 20550 NJX 1 TENDON SHEATH/LIGAMENT: CPT | Performed by: ORTHOPAEDIC SURGERY

## 2023-10-20 PROCEDURE — 4010F ACE/ARB THERAPY RXD/TAKEN: CPT | Performed by: ORTHOPAEDIC SURGERY

## 2023-10-20 PROCEDURE — 3044F HG A1C LEVEL LT 7.0%: CPT | Performed by: ORTHOPAEDIC SURGERY

## 2023-10-20 RX ORDER — BETAMETHASONE SODIUM PHOSPHATE AND BETAMETHASONE ACETATE 3; 3 MG/ML; MG/ML
6 INJECTION, SUSPENSION INTRA-ARTICULAR; INTRALESIONAL; INTRAMUSCULAR; SOFT TISSUE
Status: COMPLETED | OUTPATIENT
Start: 2023-10-20 | End: 2023-10-20

## 2023-10-20 RX ORDER — LIDOCAINE HYDROCHLORIDE 10 MG/ML
1 INJECTION INFILTRATION; PERINEURAL
Status: COMPLETED | OUTPATIENT
Start: 2023-10-20 | End: 2023-10-20

## 2023-10-20 RX ADMIN — BETAMETHASONE SODIUM PHOSPHATE AND BETAMETHASONE ACETATE 6 MG: 3; 3 INJECTION, SUSPENSION INTRA-ARTICULAR; INTRALESIONAL; INTRAMUSCULAR; SOFT TISSUE at 10:07

## 2023-10-20 RX ADMIN — LIDOCAINE HYDROCHLORIDE 1 ML: 10 INJECTION INFILTRATION; PERINEURAL at 10:07

## 2023-10-20 NOTE — PROGRESS NOTES
Bridget is here for a left third trigger finger injection.    Hand / UE Inj/Asp for trigger finger on 10/20/2023 10:07 AM  Indications: pain  Details: 25 G needle, volar approach  Medications: 6 mg betamethasone acet,sod phos 6 mg/mL; 1 mL lidocaine 10 mg/mL (1 %)  Procedure, treatment alternatives, risks and benefits explained, specific risks discussed. Consent was given by the patient. Immediately prior to procedure a time out was called to verify the correct patient, procedure, equipment, support staff and site/side marked as required. Patient was prepped and draped in the usual sterile fashion.       She will follow-up over the next 6 weeks if not improving.

## 2023-11-06 DIAGNOSIS — G25.81 RLS (RESTLESS LEGS SYNDROME): Primary | ICD-10-CM

## 2023-11-06 DIAGNOSIS — G89.29 OTHER CHRONIC PAIN: ICD-10-CM

## 2023-11-07 RX ORDER — ROPINIROLE 1 MG/1
TABLET, FILM COATED ORAL
Qty: 300 TABLET | Refills: 2 | Status: SHIPPED | OUTPATIENT
Start: 2023-11-07

## 2023-11-15 ENCOUNTER — OFFICE VISIT (OUTPATIENT)
Dept: ORTHOPEDIC SURGERY | Facility: CLINIC | Age: 63
End: 2023-11-15
Payer: MEDICARE

## 2023-11-15 DIAGNOSIS — M65.4 DE QUERVAIN'S TENOSYNOVITIS: Primary | ICD-10-CM

## 2023-11-15 PROCEDURE — 4010F ACE/ARB THERAPY RXD/TAKEN: CPT | Performed by: ORTHOPAEDIC SURGERY

## 2023-11-15 PROCEDURE — 99214 OFFICE O/P EST MOD 30 MIN: CPT | Performed by: ORTHOPAEDIC SURGERY

## 2023-11-15 PROCEDURE — 3044F HG A1C LEVEL LT 7.0%: CPT | Performed by: ORTHOPAEDIC SURGERY

## 2023-11-15 NOTE — PROGRESS NOTES
History: Bridget is here for her left hand.  We did a trigger finger injection in the third digit which has helped.  She is no longer getting triggering.  Her biggest complaint is the thumb.  She had a bit of de Quervain's tenosynovitis previously but is now having much worse pain.  She has trouble gripping and doing activities.    Past medical history: Multiple  Medications: Multiple  Allergies: No known drug allergies    Please refer to the intake H&P regarding the patient's review of systems, family history and social history as was done today    HEENT: Normal  Lungs: Clear to auscultation  Heart: RRR  Abdomen: Soft, nontender  Skin: clear  Extremity: She has significant tenderness around the first extensor compartment.  She has a positive Finkelstein's maneuver as well.  Fairly mild pain with basal joint grind test.  No triggering around the third or fourth digits.  Minimal tenderness around the A1 pulley in those fingers.  No numbness or tingling.  Contralateral exam is normal for strength, motion, stability and neurovascular assessment.    Radiographs: Previous x-rays showed mild degenerative change only.    Assessment: Worsening left hand de Quervain's tenosynovitis, stable left third trigger finger injection    Plan: She is doing better from the trigger finger standpoint but her de Quervain's is gotten worse.  We discussed several options he would rather proceed with de Quervain's release.  Risk and benefits of surgical intervention were noted and we will see back preoperatively.  She will be in a splint for the first week followed by a brace for 2 to 3 weeks.  She has a history of chronic right arm deformity and we sent her to the elbow team for evaluation but they have referred her back to [facility].  She did not wish to go back downtown and will try to make other plans for her.  All questions were answered today with the patient.    This note was generated with voice recognition software and may contain  grammatical errors.

## 2023-11-17 RX ORDER — OXYCODONE AND ACETAMINOPHEN 5; 325 MG/1; MG/1
1 TABLET ORAL EVERY 4 HOURS PRN
Qty: 180 TABLET | Refills: 0 | Status: SHIPPED | OUTPATIENT
Start: 2023-11-18 | End: 2023-12-07 | Stop reason: SDUPTHER

## 2023-12-01 ENCOUNTER — OFFICE VISIT (OUTPATIENT)
Dept: ORTHOPEDIC SURGERY | Facility: CLINIC | Age: 63
End: 2023-12-01
Payer: MEDICARE

## 2023-12-01 DIAGNOSIS — M65.4 DE QUERVAIN'S TENOSYNOVITIS: Primary | ICD-10-CM

## 2023-12-01 PROCEDURE — L3809 WHFO W/O JOINTS PRE OTS: HCPCS | Performed by: ORTHOPAEDIC SURGERY

## 2023-12-01 PROCEDURE — 4010F ACE/ARB THERAPY RXD/TAKEN: CPT | Performed by: ORTHOPAEDIC SURGERY

## 2023-12-01 PROCEDURE — 3044F HG A1C LEVEL LT 7.0%: CPT | Performed by: ORTHOPAEDIC SURGERY

## 2023-12-01 PROCEDURE — 99214 OFFICE O/P EST MOD 30 MIN: CPT | Performed by: ORTHOPAEDIC SURGERY

## 2023-12-01 RX ORDER — BETAMETHASONE SODIUM PHOSPHATE AND BETAMETHASONE ACETATE 3; 3 MG/ML; MG/ML
6 INJECTION, SUSPENSION INTRA-ARTICULAR; INTRALESIONAL; INTRAMUSCULAR; SOFT TISSUE
Status: COMPLETED | OUTPATIENT
Start: 2023-12-01 | End: 2023-12-01

## 2023-12-01 RX ORDER — LIDOCAINE HYDROCHLORIDE 10 MG/ML
1 INJECTION INFILTRATION; PERINEURAL
Status: COMPLETED | OUTPATIENT
Start: 2023-12-01 | End: 2023-12-01

## 2023-12-01 RX ADMIN — LIDOCAINE HYDROCHLORIDE 1 ML: 10 INJECTION INFILTRATION; PERINEURAL at 08:28

## 2023-12-01 RX ADMIN — BETAMETHASONE SODIUM PHOSPHATE AND BETAMETHASONE ACETATE 6 MG: 3; 3 INJECTION, SUSPENSION INTRA-ARTICULAR; INTRALESIONAL; INTRAMUSCULAR; SOFT TISSUE at 08:28

## 2023-12-01 NOTE — PROGRESS NOTES
History: Bridget is here for her left wrist.  She has known de Quervain's tenosynovitis.  She is actually on the schedule for surgery but is not sure if she can wait due to the pain.    Past medical history: Multiple  Medications: Multiple  Allergies: No known drug allergies    Please refer to the intake H&P regarding the patient's review of systems, family history and social history as was done today    HEENT: Normal  Lungs: Clear to auscultation  Heart: RRR  Abdomen: Soft, nontender  Skin: clear  Extremity: She has tenderness around the first extensor compartment.  Positive Finkelstein's maneuver again today.  No redness or skin irritation.  No numbness.  Decent  strength and motion is maintained.  Contralateral exam is normal for strength, motion, stability and neurovascular assessment.    Radiographs: None today.  Previous x-ray showed mild degenerative change only.    Assessment: Worsening left wrist de Quervain's tenosynovitis    Plan: We again discussed options and she would rather try an injection today than wait for surgery in a few weeks.  She understands that she will need to wait 3 months for any surgery after the injection.  Will see her back over the next 6 weeks to assess progress if not improving.  Hand / UE Inj/Asp for trigger finger on 12/1/2023 8:28 AM  Indications: pain  Details: 25 G needle, volar approach  Medications: 6 mg betamethasone acet,sod phos 6 mg/mL; 1 mL lidocaine 10 mg/mL (1 %)  Outcome: tolerated well, no immediate complications    Injection into first extensor compartment not trigger thumb.  Procedure, treatment alternatives, risks and benefits explained, specific risks discussed. Consent was given by the patient. Immediately prior to procedure a time out was called to verify the correct patient, procedure, equipment, support staff and site/side marked as required. Patient was prepped and draped in the usual sterile fashion.          All questions were answered today with  the patient.    This note was generated with voice recognition software and may contain grammatical errors.

## 2023-12-07 ENCOUNTER — TELEMEDICINE (OUTPATIENT)
Dept: PRIMARY CARE | Facility: CLINIC | Age: 63
End: 2023-12-07
Payer: MEDICARE

## 2023-12-07 VITALS
WEIGHT: 142 LBS | OXYGEN SATURATION: 92 % | BODY MASS INDEX: 25.16 KG/M2 | DIASTOLIC BLOOD PRESSURE: 92 MMHG | SYSTOLIC BLOOD PRESSURE: 162 MMHG | HEIGHT: 63 IN | HEART RATE: 76 BPM

## 2023-12-07 DIAGNOSIS — E55.9 VITAMIN D DEFICIENCY: ICD-10-CM

## 2023-12-07 DIAGNOSIS — E11.9 TYPE 2 DIABETES MELLITUS WITHOUT COMPLICATION, WITHOUT LONG-TERM CURRENT USE OF INSULIN (MULTI): ICD-10-CM

## 2023-12-07 DIAGNOSIS — G89.4 CHRONIC PAIN SYNDROME: ICD-10-CM

## 2023-12-07 DIAGNOSIS — E78.5 HYPERLIPIDEMIA, UNSPECIFIED HYPERLIPIDEMIA TYPE: ICD-10-CM

## 2023-12-07 DIAGNOSIS — G89.29 OTHER CHRONIC PAIN: ICD-10-CM

## 2023-12-07 DIAGNOSIS — Z00.00 PREVENTATIVE HEALTH CARE: ICD-10-CM

## 2023-12-07 DIAGNOSIS — Z13.9 SCREENING FOR CONDITION: ICD-10-CM

## 2023-12-07 DIAGNOSIS — I10 BENIGN ESSENTIAL HYPERTENSION: ICD-10-CM

## 2023-12-07 DIAGNOSIS — Z98.84 BARIATRIC SURGERY STATUS: ICD-10-CM

## 2023-12-07 DIAGNOSIS — F32.2 MAJOR DEPRESSIVE DISORDER, SINGLE EPISODE, SEVERE WITHOUT PSYCHOTIC FEATURES (MULTI): ICD-10-CM

## 2023-12-07 DIAGNOSIS — Z79.899 DRUG THERAPY: Primary | ICD-10-CM

## 2023-12-07 DIAGNOSIS — I25.10 CORONARY ARTERY DISEASE INVOLVING NATIVE CORONARY ARTERY OF NATIVE HEART WITHOUT ANGINA PECTORIS: ICD-10-CM

## 2023-12-07 PROCEDURE — 99443 PR PHYS/QHP TELEPHONE EVALUATION 21-30 MIN: CPT | Performed by: FAMILY MEDICINE

## 2023-12-07 RX ORDER — NALOXONE HYDROCHLORIDE 4 MG/.1ML
4 SPRAY NASAL AS NEEDED
Qty: 2 EACH | Refills: 3 | Status: SHIPPED | OUTPATIENT
Start: 2023-12-07 | End: 2023-12-08

## 2023-12-07 RX ORDER — OXYCODONE AND ACETAMINOPHEN 5; 325 MG/1; MG/1
1 TABLET ORAL EVERY 4 HOURS PRN
Qty: 180 TABLET | Refills: 0 | Status: SHIPPED | OUTPATIENT
Start: 2023-12-18 | End: 2024-01-16 | Stop reason: SDUPTHER

## 2023-12-07 NOTE — PATIENT INSTRUCTIONS
Patient Discussion/Summary     Preventative visit next due in 2024. We will do Medicare wellness later in the year.  March 2024 is next controlled substance contract and UDS.     -------  Screen for breast cancer, colon cancer. (Had hysterectomy around 2001). Had normal mammogram March 2022. -->>  Be sure to schedule your mammogram.     Patient had in the home colon cancer screening done summer 2021. Was told it was negative. Next one is due around summer 2024.     Hepatitis C screening was negative January 2022.     Need for immunizations. Patient is UTD on annual flu shot and had Tdap February 2022. Due for Shingles immunizations. UTD on coronavirus series, due for bivalent booster. -->> check with pharmacy to keep up-to-date on immunizations.     -------  Cardiac CT scoring was 113, over read was negative. -->> We will plan to aggressively manage risk factors including LDL below 70.      H/o Obesity, BMI 25, down 2 lb since last appt here. Down from maximum of 283 February 2020. Is post gastric bypass May 2021. -->> Keep up the most excellent work.     Chronic pain, right arm, low back, left shoulder, hip pain. R arm post many surgeries and injuries. They are discussing L arm surgery. Seeing Dr. Brando GRIFFIN. Is now seeing pain management/Dr. Meza. Pain mgt asked us to continue prescribe Percocet while they work on other modalities.   Is currently on 6 Percocet a day. Failed Voltaren gel (ineffective). On duloxetine, 60mg daily, which seems to be at least a little better than the 30 mg. Finding it at least somewhat helpful. -->> Follow-up up with pain management as planned.  Has a referral to [facility].  Next controlled substance agreement and UDS is due around March 1, 2024.     Regarding previous labs: Next annual labs will be around February 2024.   - Vitamin D deficiency, 54, was 62, 81, 75, 57, 56, 67, 54, 49, 52, 42, 51, 25, 21, goal is . Post gastric bypass. Patient is currently on 100,000  units weekly,  -->> Increase to 3 pills a week for total of 150,000 units weekly.  We will recheck with next annual labs in about 6 months.  - type 2 diabetes, A1c is 4.9, was 5.2, 5.1, 6.1, 5.9, 6.3, 6.9, 5.8. Patient does know she has significantly cut back on sugar. No longer on Metformin. Will recheck A1c in 12 months  - Drug therapy -->> Plan to recheck annual labs around January 2022.   - Hyperlipidemia, HDL 46, was 41, 44, 40, 42, 38, 45, 41.3, goal is 45 or more. LDL or bad cholesterol, is 71, was 73, 137, 137, 136, 117, 128, 107, your goal is now 70 or less. -->> Keep up the good work. Patient is now post gastric bypass surgery.  -Dehydrated -->> drink more water especially before labs.     Depression, in general does well with sertraline. -->> Will refill as needed.     RLS, doing well 1 mg ropinirole TID. -->> We will refill as needed.     h/o CAD, abnormal EKG, PVCs.  Saw Dr. Sanford.  Looks like he told patient does not need to follow-up unless she has any more problems.       Patient did quit smoking January 5, 2019. -->> Keep up the most excellent work.     - patient will return in about 3 months for CSA, UDS, annual preventative exam, annual lab review.    -Patient will come here a week before the appointment to get fasting annual labs

## 2023-12-07 NOTE — PROGRESS NOTES
Subjective   Patient ID: Bridget Hannon is a 62 y.o. female who presents for 90 Day Refill FU (Pt in today for 90 Day refill FU).    Review of Systems  Denies N/V/D/C, no HA/S/V, denies rashes/lesions, no CP/SOB. Denies fevers/chills.  All other systems were negative.  Positive for chronic pain multiple locations.    Objective   Physical Exam  Gen: NAD  eyes: EOMI, PERRLA  ENT: hearing grossly intact, no nasal discharge  resp: CTABL, without R/R  heart: RRR without MRG  GI: abd: S/ND/NT, BS+  lymph: no axillary, cervical, supraclavicular lymphadenopathy noted   MS: gait grossly WNL,  derm: no rashes or lesions noted  neuro: CN II-XII grossly intact  psych: A&Ox3    Assessment/Plan   Diagnoses and all orders for this visit:  Drug therapy  -     naloxone (Narcan) 4 mg/0.1 mL nasal spray; Administer 1 spray (4 mg) into affected nostril(s) if needed for opioid reversal for up to 1 day. May repeat every 2-3 minutes if needed, alternating nostrils, until medical assistance becomes available.  -     Urinalysis with Reflex Microscopic; Future  -     CBC and Auto Differential; Future  -     Comprehensive Metabolic Panel; Future  -     Magnesium; Future  -     Urinalysis with Reflex Microscopic; Future  Other chronic pain  -     oxyCODONE-acetaminophen (Percocet) 5-325 mg tablet; Take 1 tablet by mouth every 4 hours if needed for severe pain (7 - 10). Do not start before December 18, 2023.  Screening for condition  -     TSH with reflex to Free T4 if abnormal; Future  -     TSH with reflex to Free T4 if abnormal; Future  -     Lipid Panel; Future  -     Hemoglobin A1C; Future  Type 2 diabetes mellitus without complication, without long-term current use of insulin (CMS/[facility])  -     Hemoglobin A1C; Future  Coronary artery disease involving native coronary artery of native heart without angina pectoris  Vitamin D deficiency  -     Vitamin D 25-Hydroxy,Total (for eval of Vitamin D levels); Future  Preventative health  care  -     TSH with reflex to Free T4 if abnormal; Future  -     Urinalysis with Reflex Microscopic; Future  -     CBC and Auto Differential; Future  -     Comprehensive Metabolic Panel; Future  -     TSH with reflex to Free T4 if abnormal; Future  -     Magnesium; Future  -     Lipid Panel; Future  -     Hemoglobin A1C; Future  -     Urinalysis with Reflex Microscopic; Future  -     Vitamin D 25-Hydroxy,Total (for eval of Vitamin D levels); Future  Bariatric surgery status  Benign essential hypertension  Hyperlipidemia, unspecified hyperlipidemia type  -     Lipid Panel; Future  Major depressive disorder, single episode, severe without psychotic features (CMS/HCC)  Chronic pain syndrome

## 2024-01-16 ENCOUNTER — TELEMEDICINE (OUTPATIENT)
Dept: PRIMARY CARE | Facility: CLINIC | Age: 64
End: 2024-01-16
Payer: MEDICARE

## 2024-01-16 DIAGNOSIS — J01.10 ACUTE NON-RECURRENT FRONTAL SINUSITIS: ICD-10-CM

## 2024-01-16 DIAGNOSIS — G89.29 OTHER CHRONIC PAIN: ICD-10-CM

## 2024-01-16 DIAGNOSIS — E11.9 TYPE 2 DIABETES MELLITUS WITHOUT COMPLICATION, WITHOUT LONG-TERM CURRENT USE OF INSULIN (MULTI): Primary | ICD-10-CM

## 2024-01-16 DIAGNOSIS — J20.9 ACUTE BRONCHITIS, UNSPECIFIED ORGANISM: ICD-10-CM

## 2024-01-16 PROCEDURE — 99214 OFFICE O/P EST MOD 30 MIN: CPT | Performed by: FAMILY MEDICINE

## 2024-01-16 RX ORDER — FLUTICASONE PROPIONATE 50 MCG
SPRAY, SUSPENSION (ML) NASAL
Qty: 16 G | Refills: 5 | Status: SHIPPED | OUTPATIENT
Start: 2024-01-16

## 2024-01-16 RX ORDER — DOXYCYCLINE 100 MG/1
CAPSULE ORAL
Qty: 20 CAPSULE | Refills: 0 | Status: SHIPPED | OUTPATIENT
Start: 2024-01-16

## 2024-01-16 RX ORDER — OXYCODONE AND ACETAMINOPHEN 5; 325 MG/1; MG/1
1 TABLET ORAL EVERY 4 HOURS PRN
Qty: 180 TABLET | Refills: 0 | Status: SHIPPED | OUTPATIENT
Start: 2024-01-17 | End: 2024-02-13 | Stop reason: SDUPTHER

## 2024-01-16 RX ORDER — GUAIFENESIN 600 MG/1
TABLET, EXTENDED RELEASE ORAL
Qty: 30 TABLET | Refills: 2 | Status: SHIPPED | OUTPATIENT
Start: 2024-01-16

## 2024-01-16 ASSESSMENT — ENCOUNTER SYMPTOMS
DIARRHEA: 0
ABDOMINAL PAIN: 0
VOMITING: 0
SORE THROAT: 0
NECK PAIN: 0
RHINORRHEA: 1
HEADACHES: 1
COUGH: 1

## 2024-01-16 NOTE — PROGRESS NOTES
Subjective   Patient ID: Bridget Hannon is a 63 y.o. female who presents for Virtual Visit (Pt being seen virtually for sick visit).      Review of Systems  Denies N/V/D/C, no S/V, denies rashes/lesions, no CP/SOB. Denies fevers/chills. (Was having fever a few days ago), positive for frontal headache, as well as ear discomfort.  All other systems were negative.    Objective   There were no vitals taken for this visit.    Physical Exam  Gen: NAD  eyes: EOMI,   ENT: hearing grossly intact, no nasal discharge  resp: breathing comfortably, no SOB noted  derm: no rashes or lesions noted  neuro: CN II-XII grossly intact  psych: A&Ox3, mood pleasant, affect appropriate, recent and remote memory grossly intact.    Assessment/Plan   Diagnoses and all orders for this visit:  Type 2 diabetes mellitus without complication, without long-term current use of insulin (CMS/[facility])  Other chronic pain  -     oxyCODONE-acetaminophen (Percocet) 5-325 mg tablet; Take 1 tablet by mouth every 4 hours if needed for severe pain (7 - 10). Do not start before January 17, 2024.  Acute non-recurrent frontal sinusitis  -     doxycycline (Monodox) 100 mg capsule; 1 by mouth twice daily with food for 10 days  -     guaiFENesin (Mucinex) 600 mg 12 hr tablet; 1 by mouth twice daily with large glass of water as needed to help cough up phlegm more easily.  Be sure to drink plenty of water throughout the day.  -     fluticasone (Flonase) 50 mcg/actuation nasal spray; 2 sprays each nostril twice daily for the first 3 days, thereafter 2 sprays each nostril once daily.  Acute bronchitis, unspecified organism  -     doxycycline (Monodox) 100 mg capsule; 1 by mouth twice daily with food for 10 days  -     guaiFENesin (Mucinex) 600 mg 12 hr tablet; 1 by mouth twice daily with large glass of water as needed to help cough up phlegm more easily.  Be sure to drink plenty of water throughout the day.  -     fluticasone (Flonase) 50 mcg/actuation nasal spray;  2 sprays each nostril twice daily for the first 3 days, thereafter 2 sprays each nostril once daily.        Answers submitted by the patient for this visit:  Ear Pain Questionnaire (Submitted on 1/16/2024)  Chief Complaint: Ear pain  Affected ear: right  Chronicity: new  Onset: yesterday  Progression since onset: rapidly worsening  Frequency: constantly  Fever: 100.4 - 100.9 F  Fever duration: 1 to 2 days  Pain - numeric: 3/10

## 2024-01-16 NOTE — PATIENT INSTRUCTIONS
Acute bronchitis, acute sinusitis, started about a week ago, started with sneezing and coughing, then moved onto headache with sinus congestion and ear discomfort.  Has been taking Tylenol Cold and sinus.  Anything else -->> will prescribe doxycycline, Mucinex, fluticasone nasal spray.    Type 2 diabetes, this is an additional complicating/comorbidity affecting patient's.  Making it even more important to prescribe antibiotics.    Chronic pain, is due for refill on Percocet. -->>  Refilled today.    Follow-up end of February, early March as already scheduled.

## 2024-01-18 DIAGNOSIS — G89.29 OTHER CHRONIC PAIN: ICD-10-CM

## 2024-01-19 RX ORDER — OXYCODONE AND ACETAMINOPHEN 5; 325 MG/1; MG/1
1 TABLET ORAL EVERY 4 HOURS PRN
Qty: 180 TABLET | Refills: 0 | OUTPATIENT
Start: 2024-01-19 | End: 2024-02-18

## 2024-02-10 DIAGNOSIS — G62.9 NEUROPATHY: ICD-10-CM

## 2024-02-12 RX ORDER — GABAPENTIN 800 MG/1
TABLET ORAL
Qty: 270 TABLET | Refills: 3 | Status: SHIPPED | OUTPATIENT
Start: 2024-02-12

## 2024-02-13 DIAGNOSIS — G89.29 OTHER CHRONIC PAIN: ICD-10-CM

## 2024-02-14 RX ORDER — OXYCODONE AND ACETAMINOPHEN 5; 325 MG/1; MG/1
1 TABLET ORAL EVERY 4 HOURS PRN
Qty: 180 TABLET | Refills: 0 | Status: SHIPPED | OUTPATIENT
Start: 2024-02-16 | End: 2024-03-06 | Stop reason: SDUPTHER

## 2024-02-24 DIAGNOSIS — E55.9 VITAMIN D DEFICIENCY, UNSPECIFIED: ICD-10-CM

## 2024-02-26 ENCOUNTER — APPOINTMENT (OUTPATIENT)
Dept: ORTHOPEDIC SURGERY | Facility: CLINIC | Age: 64
End: 2024-02-26
Payer: MEDICARE

## 2024-02-26 RX ORDER — ASPIRIN 325 MG
TABLET, DELAYED RELEASE (ENTERIC COATED) ORAL
Qty: 8 CAPSULE | Refills: 5 | Status: SHIPPED | OUTPATIENT
Start: 2024-02-26 | End: 2024-03-06 | Stop reason: SDUPTHER

## 2024-02-28 ENCOUNTER — APPOINTMENT (OUTPATIENT)
Dept: PRIMARY CARE | Facility: CLINIC | Age: 64
End: 2024-02-28
Payer: MEDICARE

## 2024-03-01 ENCOUNTER — CLINICAL SUPPORT (OUTPATIENT)
Dept: PRIMARY CARE | Facility: CLINIC | Age: 64
End: 2024-03-01
Payer: MEDICARE

## 2024-03-01 DIAGNOSIS — Z13.9 SCREENING FOR CONDITION: ICD-10-CM

## 2024-03-01 DIAGNOSIS — E11.9 TYPE 2 DIABETES MELLITUS WITHOUT COMPLICATION, WITHOUT LONG-TERM CURRENT USE OF INSULIN (MULTI): ICD-10-CM

## 2024-03-01 DIAGNOSIS — E55.9 VITAMIN D DEFICIENCY: ICD-10-CM

## 2024-03-01 DIAGNOSIS — Z00.00 PREVENTATIVE HEALTH CARE: ICD-10-CM

## 2024-03-01 DIAGNOSIS — Z79.899 DRUG THERAPY: ICD-10-CM

## 2024-03-01 DIAGNOSIS — E78.5 HYPERLIPIDEMIA, UNSPECIFIED HYPERLIPIDEMIA TYPE: ICD-10-CM

## 2024-03-01 LAB
25(OH)D3 SERPL-MCNC: 46 NG/ML (ref 30–100)
ALBUMIN SERPL BCP-MCNC: 4.2 G/DL (ref 3.4–5)
ALP SERPL-CCNC: 126 U/L (ref 33–136)
ALT SERPL W P-5'-P-CCNC: 15 U/L (ref 7–45)
ANION GAP SERPL CALC-SCNC: 13 MMOL/L (ref 10–20)
APPEARANCE UR: CLEAR
AST SERPL W P-5'-P-CCNC: 14 U/L (ref 9–39)
BACTERIA #/AREA URNS AUTO: ABNORMAL /HPF
BASOPHILS # BLD AUTO: 0.05 X10*3/UL (ref 0–0.1)
BASOPHILS NFR BLD AUTO: 0.6 %
BILIRUB SERPL-MCNC: 0.5 MG/DL (ref 0–1.2)
BILIRUB UR STRIP.AUTO-MCNC: NEGATIVE MG/DL
BUN SERPL-MCNC: 13 MG/DL (ref 6–23)
CALCIUM SERPL-MCNC: 9 MG/DL (ref 8.6–10.3)
CHLORIDE SERPL-SCNC: 106 MMOL/L (ref 98–107)
CHOLEST SERPL-MCNC: 138 MG/DL (ref 0–199)
CHOLESTEROL/HDL RATIO: 2.3
CO2 SERPL-SCNC: 27 MMOL/L (ref 21–32)
COLOR UR: YELLOW
CREAT SERPL-MCNC: 0.56 MG/DL (ref 0.5–1.05)
EGFRCR SERPLBLD CKD-EPI 2021: >90 ML/MIN/1.73M*2
EOSINOPHIL # BLD AUTO: 0.1 X10*3/UL (ref 0–0.7)
EOSINOPHIL NFR BLD AUTO: 1.1 %
ERYTHROCYTE [DISTWIDTH] IN BLOOD BY AUTOMATED COUNT: 12.1 % (ref 11.5–14.5)
GLUCOSE SERPL-MCNC: 83 MG/DL (ref 74–99)
GLUCOSE UR STRIP.AUTO-MCNC: NEGATIVE MG/DL
HCT VFR BLD AUTO: 45.5 % (ref 36–46)
HDLC SERPL-MCNC: 59.3 MG/DL
HGB BLD-MCNC: 15 G/DL (ref 12–16)
IMM GRANULOCYTES # BLD AUTO: 0.03 X10*3/UL (ref 0–0.7)
IMM GRANULOCYTES NFR BLD AUTO: 0.3 % (ref 0–0.9)
KETONES UR STRIP.AUTO-MCNC: NEGATIVE MG/DL
LDLC SERPL CALC-MCNC: 63 MG/DL
LEUKOCYTE ESTERASE UR QL STRIP.AUTO: NEGATIVE
LYMPHOCYTES # BLD AUTO: 2.93 X10*3/UL (ref 1.2–4.8)
LYMPHOCYTES NFR BLD AUTO: 33.1 %
MAGNESIUM SERPL-MCNC: 2.22 MG/DL (ref 1.6–2.4)
MCH RBC QN AUTO: 31.4 PG (ref 26–34)
MCHC RBC AUTO-ENTMCNC: 33 G/DL (ref 32–36)
MCV RBC AUTO: 95 FL (ref 80–100)
MONOCYTES # BLD AUTO: 0.7 X10*3/UL (ref 0.1–1)
MONOCYTES NFR BLD AUTO: 7.9 %
MUCOUS THREADS #/AREA URNS AUTO: ABNORMAL /LPF
NEUTROPHILS # BLD AUTO: 5.03 X10*3/UL (ref 1.2–7.7)
NEUTROPHILS NFR BLD AUTO: 57 %
NITRITE UR QL STRIP.AUTO: NEGATIVE
NON HDL CHOLESTEROL: 79 MG/DL (ref 0–149)
NRBC BLD-RTO: 0 /100 WBCS (ref 0–0)
PH UR STRIP.AUTO: 5 [PH]
PLATELET # BLD AUTO: 267 X10*3/UL (ref 150–450)
POTASSIUM SERPL-SCNC: 4.4 MMOL/L (ref 3.5–5.3)
PROT SERPL-MCNC: 6.6 G/DL (ref 6.4–8.2)
PROT UR STRIP.AUTO-MCNC: NEGATIVE MG/DL
RBC # BLD AUTO: 4.77 X10*6/UL (ref 4–5.2)
RBC # UR STRIP.AUTO: ABNORMAL /UL
RBC #/AREA URNS AUTO: ABNORMAL /HPF
SODIUM SERPL-SCNC: 142 MMOL/L (ref 136–145)
SP GR UR STRIP.AUTO: 1.02
SQUAMOUS #/AREA URNS AUTO: ABNORMAL /HPF
TRIGL SERPL-MCNC: 79 MG/DL (ref 0–149)
TSH SERPL-ACNC: 1.7 MIU/L (ref 0.44–3.98)
UROBILINOGEN UR STRIP.AUTO-MCNC: <2 MG/DL
VLDL: 16 MG/DL (ref 0–40)
WBC # BLD AUTO: 8.8 X10*3/UL (ref 4.4–11.3)
WBC #/AREA URNS AUTO: ABNORMAL /HPF

## 2024-03-01 PROCEDURE — 82306 VITAMIN D 25 HYDROXY: CPT

## 2024-03-01 PROCEDURE — 80061 LIPID PANEL: CPT

## 2024-03-01 PROCEDURE — 83036 HEMOGLOBIN GLYCOSYLATED A1C: CPT

## 2024-03-01 PROCEDURE — 36415 COLL VENOUS BLD VENIPUNCTURE: CPT

## 2024-03-01 PROCEDURE — 84443 ASSAY THYROID STIM HORMONE: CPT

## 2024-03-01 PROCEDURE — 85025 COMPLETE CBC W/AUTO DIFF WBC: CPT

## 2024-03-01 PROCEDURE — 80053 COMPREHEN METABOLIC PANEL: CPT

## 2024-03-01 PROCEDURE — 81001 URINALYSIS AUTO W/SCOPE: CPT

## 2024-03-01 PROCEDURE — 83735 ASSAY OF MAGNESIUM: CPT

## 2024-03-01 NOTE — PROGRESS NOTES
Subjective   Patient ID: Bridget Hannon is a 63 y.o. female who presents for Labs Only (Pt in today for lab draw).    HPI     Review of Systems    Objective   There were no vitals taken for this visit.    Physical Exam    Assessment/Plan

## 2024-03-02 LAB
EST. AVERAGE GLUCOSE BLD GHB EST-MCNC: 94 MG/DL
HBA1C MFR BLD: 4.9 %

## 2024-03-06 ENCOUNTER — OFFICE VISIT (OUTPATIENT)
Dept: PRIMARY CARE | Facility: CLINIC | Age: 64
End: 2024-03-06
Payer: MEDICARE

## 2024-03-06 VITALS
HEIGHT: 63 IN | SYSTOLIC BLOOD PRESSURE: 164 MMHG | HEART RATE: 77 BPM | OXYGEN SATURATION: 90 % | WEIGHT: 147 LBS | DIASTOLIC BLOOD PRESSURE: 103 MMHG | BODY MASS INDEX: 26.05 KG/M2

## 2024-03-06 DIAGNOSIS — Z13.9 SCREENING FOR CONDITION: ICD-10-CM

## 2024-03-06 DIAGNOSIS — G25.81 RLS (RESTLESS LEGS SYNDROME): ICD-10-CM

## 2024-03-06 DIAGNOSIS — E78.5 HYPERLIPIDEMIA, UNSPECIFIED HYPERLIPIDEMIA TYPE: ICD-10-CM

## 2024-03-06 DIAGNOSIS — M54.9 CHRONIC BACK PAIN, UNSPECIFIED BACK LOCATION, UNSPECIFIED BACK PAIN LATERALITY: ICD-10-CM

## 2024-03-06 DIAGNOSIS — Z79.899 DRUG THERAPY: ICD-10-CM

## 2024-03-06 DIAGNOSIS — R94.31 ABNORMAL EKG: ICD-10-CM

## 2024-03-06 DIAGNOSIS — G89.29 CHRONIC BACK PAIN, UNSPECIFIED BACK LOCATION, UNSPECIFIED BACK PAIN LATERALITY: ICD-10-CM

## 2024-03-06 DIAGNOSIS — Z98.84 BARIATRIC SURGERY STATUS: ICD-10-CM

## 2024-03-06 DIAGNOSIS — G89.29 OTHER CHRONIC PAIN: ICD-10-CM

## 2024-03-06 DIAGNOSIS — E55.9 VITAMIN D DEFICIENCY, UNSPECIFIED: ICD-10-CM

## 2024-03-06 DIAGNOSIS — I10 BENIGN ESSENTIAL HYPERTENSION: ICD-10-CM

## 2024-03-06 DIAGNOSIS — I25.10 CORONARY ARTERY DISEASE INVOLVING NATIVE CORONARY ARTERY OF NATIVE HEART WITHOUT ANGINA PECTORIS: ICD-10-CM

## 2024-03-06 DIAGNOSIS — E11.9 TYPE 2 DIABETES MELLITUS WITHOUT COMPLICATION, WITHOUT LONG-TERM CURRENT USE OF INSULIN (MULTI): Primary | ICD-10-CM

## 2024-03-06 DIAGNOSIS — G89.4 CHRONIC PAIN SYNDROME: ICD-10-CM

## 2024-03-06 DIAGNOSIS — F32.A DEPRESSION, UNSPECIFIED DEPRESSION TYPE: ICD-10-CM

## 2024-03-06 DIAGNOSIS — E55.9 VITAMIN D DEFICIENCY: ICD-10-CM

## 2024-03-06 LAB
AMPHETAMINES UR QL SCN: NORMAL
BARBITURATES UR QL SCN: NORMAL
BENZODIAZ UR QL SCN: NORMAL
BZE UR QL SCN: NORMAL
CANNABINOIDS UR QL SCN: NORMAL
FENTANYL+NORFENTANYL UR QL SCN: NORMAL
METHADONE UR QL SCN: NORMAL
OPIATES UR QL SCN: NORMAL
OXYCODONE+OXYMORPHONE UR QL SCN: NORMAL
PCP UR QL SCN: NORMAL

## 2024-03-06 PROCEDURE — 80307 DRUG TEST PRSMV CHEM ANLYZR: CPT

## 2024-03-06 PROCEDURE — 3048F LDL-C <100 MG/DL: CPT | Performed by: FAMILY MEDICINE

## 2024-03-06 PROCEDURE — 3077F SYST BP >= 140 MM HG: CPT | Performed by: FAMILY MEDICINE

## 2024-03-06 PROCEDURE — 3080F DIAST BP >= 90 MM HG: CPT | Performed by: FAMILY MEDICINE

## 2024-03-06 PROCEDURE — 3044F HG A1C LEVEL LT 7.0%: CPT | Performed by: FAMILY MEDICINE

## 2024-03-06 PROCEDURE — 99396 PREV VISIT EST AGE 40-64: CPT | Performed by: FAMILY MEDICINE

## 2024-03-06 PROCEDURE — 4010F ACE/ARB THERAPY RXD/TAKEN: CPT | Performed by: FAMILY MEDICINE

## 2024-03-06 PROCEDURE — 99214 OFFICE O/P EST MOD 30 MIN: CPT | Performed by: FAMILY MEDICINE

## 2024-03-06 RX ORDER — ASPIRIN 325 MG
TABLET, DELAYED RELEASE (ENTERIC COATED) ORAL
Qty: 12 CAPSULE | Refills: 5 | Status: SHIPPED | OUTPATIENT
Start: 2024-03-06

## 2024-03-06 RX ORDER — OXYCODONE AND ACETAMINOPHEN 5; 325 MG/1; MG/1
1 TABLET ORAL EVERY 4 HOURS PRN
Qty: 180 TABLET | Refills: 0 | Status: SHIPPED | OUTPATIENT
Start: 2024-03-17 | End: 2024-04-12 | Stop reason: SDUPTHER

## 2024-03-06 NOTE — PROGRESS NOTES
Subjective   Patient ID: Bridget Hannon is a 63 y.o. female who presents for 3 Month FU (Pt in today for routine 3 month FU).    Review of Systems  Denies N/V/D/C, no HA/S/V, denies rashes/lesions, no CP/SOB. Denies fevers/chills.  All other systems were negative.  Positive for chronic pain multiple locations.    Objective   Physical Exam  Gen: NAD  eyes: EOMI, PERRLA  ENT: hearing grossly intact, no nasal discharge  resp: CTABL, without R/R  heart: RRR without MRG  GI: abd: S/ND/NT, BS+  lymph: no axillary, cervical, supraclavicular lymphadenopathy noted   MS: gait grossly WNL,  derm: no rashes or lesions noted  neuro: CN II-XII grossly intact  psych: A&Ox3    Assessment/Plan   There are no diagnoses linked to this encounter.    Patient Discussion/Summary     March 2024: Doing CSA and UDS.  With annual lab review.  Around June will do annual preventative visit.  Around September will do Medicare wellness visit.       -------  Screen for breast cancer, colon cancer. (Had hysterectomy around 2001). Had normal mammogram March 2022. -->>  Be sure to schedule your mammogram.     Patient had in the home colon cancer screening done summer 2021. Was told it was negative. Next one is due around summer 2024.     Hepatitis C screening was negative January 2022.     Need for immunizations. Patient is due for annual flu shot and had Tdap February 2022. Due for Shingles immunizations. UTD on coronavirus series, due for bivalent booster. -->> check with pharmacy to keep up-to-date on immunizations.     -------  Cardiac CT scoring was 113, over read was negative. -->> We will plan to aggressively manage risk factors including LDL below 70.      H/o Obesity, BMI 26, Down from maximum of 283 February 2020. Is post gastric bypass May 2021. -->> UI     Chronic pain, right arm, low back, left shoulder, hip pain. R arm post many surgeries and injuries. They are discussing potential L arm surgery. Seeing Dr. Brando JARRETT Is now seeing  pain management/Dr. Meza. Pain mgt asked us to continue prescribe Percocet while they work on other modalities.   Is currently on 6 Percocet a day. Failed Voltaren gel (ineffective). On duloxetine, 60mg daily, which seems to be at least a little better than the 30 mg. Finding it at least somewhat helpful. -->> Follow-up up with pain management as planned.  Has a referral to [facility].  Did CSA and UDS today.  Could consider trying over-the-counter capsaicin topical to see if that helps her pain.      New annual labs reviewed:  Next annual labs will be around February-march 2025.   September recheck vitamin D,    -Dehydrated -->> drink more water especially before labs.    - Hyperlipidemia: Strahl numbers are excellent on no meds.-->> Keep up the excellent work. Patient is now post gastric bypass surgery.    - type 2 diabetes: A1c is 4.9, was 4.9, 5.2, 5.1, 6.1, 5.9, 6.3, 6.9, 5.8. Patient does note she has significantly cut back on sugar. No longer on Metformin. Will recheck A1c in 12 months    - Vitamin D deficiency, 46, was 54, 62, 81, 75, 57, 56, 67, 54, 49, 52, 42, 51, 25, 21, goal is . Post gastric bypass. Patient is currently on 100,000 units weekly. -->> Increase to 3 pills a week for total of 150,000 units weekly.  We will recheck in about 6 months.    - Drug therapy -->> Plan to recheck annual labs around January 2022.        Depression, in general does well with sertraline. -->> Will refill as needed.     RLS, doing well 1 mg ropinirole TID. -->> We will refill as needed.     h/o CAD, abnormal EKG, PVCs.  Has previously seen Dr. Sanford.  Looks like he told patient does not need to follow-up unless she has any more problems.       Patient did quit smoking January 5, 2019. -->> Keep up the most excellent work.           - Doing UDS today.  - patient will return in about 3 months for preventative visit and controlled substance refill.

## 2024-03-06 NOTE — PATIENT INSTRUCTIONS
Patient Discussion/Summary     March 2024: Doing CSA and UDS.  With annual lab review.  Around June will do annual preventative visit.  Around September will do Medicare wellness visit.       -------  Screen for breast cancer, colon cancer. (Had hysterectomy around 2001). Had normal mammogram March 2022. -->>  Be sure to schedule your mammogram.     Patient had in the home colon cancer screening done summer 2021. Was told it was negative. Next one is due around summer 2024.     Hepatitis C screening was negative January 2022.     Need for immunizations. Patient is due for annual flu shot and had Tdap February 2022. Due for Shingles immunizations. UTD on coronavirus series, due for bivalent booster. -->> check with pharmacy to keep up-to-date on immunizations.     -------  Cardiac CT scoring was 113, over read was negative. -->> We will plan to aggressively manage risk factors including LDL below 70.      H/o Obesity, BMI 26, Down from maximum of 283 February 2020. Is post gastric bypass May 2021. -->> UI     Chronic pain, right arm, low back, left shoulder, hip pain. R arm post many surgeries and injuries. They are discussing potential L arm surgery. Seeing Dr. Brando GRIFFIN. Is now seeing pain management/Dr. Meza. Pain mgt asked us to continue prescribe Percocet while they work on other modalities.   Is currently on 6 Percocet a day. Failed Voltaren gel (ineffective). On duloxetine, 60mg daily, which seems to be at least a little better than the 30 mg. Finding it at least somewhat helpful. -->> Follow-up up with pain management as planned.  Has a referral to [facility].  Did CSA and UDS today.  Could consider trying over-the-counter capsaicin topical to see if that helps her pain.      New annual labs reviewed:  Next annual labs will be around February-march 2025.   September recheck vitamin D,    -Dehydrated -->> drink more water especially before labs.    - Hyperlipidemia: Maye numbers are excellent on  no meds.-->> Keep up the excellent work. Patient is now post gastric bypass surgery.    - type 2 diabetes: A1c is 4.9, was 4.9, 5.2, 5.1, 6.1, 5.9, 6.3, 6.9, 5.8. Patient does note she has significantly cut back on sugar. No longer on Metformin. Will recheck A1c in 12 months    - Vitamin D deficiency, 46, was 54, 62, 81, 75, 57, 56, 67, 54, 49, 52, 42, 51, 25, 21, goal is . Post gastric bypass. Patient is currently on 100,000 units weekly. -->> Increase to 3 pills a week for total of 150,000 units weekly.  We will recheck in about 6 months.    - Drug therapy -->> Plan to recheck annual labs around January 2022.        Depression, in general does well with sertraline. -->> Will refill as needed.     RLS, doing well 1 mg ropinirole TID. -->> We will refill as needed.     h/o CAD, abnormal EKG, PVCs.  Has previously seen Dr. Sanford.  Looks like he told patient does not need to follow-up unless she has any more problems.       Patient did quit smoking January 5, 2019. -->> Keep up the most excellent work.           - Doing UDS today.  - patient will return in about 3 months for preventative visit and controlled substance refill.

## 2024-04-12 DIAGNOSIS — G89.29 OTHER CHRONIC PAIN: ICD-10-CM

## 2024-04-14 RX ORDER — OXYCODONE AND ACETAMINOPHEN 5; 325 MG/1; MG/1
1 TABLET ORAL EVERY 4 HOURS PRN
Qty: 180 TABLET | Refills: 0 | Status: SHIPPED | OUTPATIENT
Start: 2024-04-16 | End: 2024-05-15 | Stop reason: SDUPTHER

## 2024-04-29 ENCOUNTER — OFFICE VISIT (OUTPATIENT)
Dept: ORTHOPEDIC SURGERY | Facility: CLINIC | Age: 64
End: 2024-04-29
Payer: MEDICARE

## 2024-04-29 DIAGNOSIS — M25.50 MULTIPLE JOINT PAIN: ICD-10-CM

## 2024-04-29 PROCEDURE — 3044F HG A1C LEVEL LT 7.0%: CPT | Performed by: ORTHOPAEDIC SURGERY

## 2024-04-29 PROCEDURE — 99214 OFFICE O/P EST MOD 30 MIN: CPT | Performed by: ORTHOPAEDIC SURGERY

## 2024-04-29 PROCEDURE — 4010F ACE/ARB THERAPY RXD/TAKEN: CPT | Performed by: ORTHOPAEDIC SURGERY

## 2024-04-29 PROCEDURE — 3048F LDL-C <100 MG/DL: CPT | Performed by: ORTHOPAEDIC SURGERY

## 2024-04-29 RX ORDER — CELECOXIB 200 MG/1
200 CAPSULE ORAL DAILY
Qty: 30 CAPSULE | Refills: 2 | Status: SHIPPED | OUTPATIENT
Start: 2024-04-29 | End: 2024-07-28

## 2024-04-29 NOTE — PROGRESS NOTES
History: Bridget is here for her left hand.  We saw her in December for a de Quervain's injection which worked quite well.  Her newer complaint is pain and stiffness in the fingers.  She is having trouble bending the third fourth and fifth fingers significantly.  She denies any previous trauma or systemic issues.    Past medical history: Multiple  Medications: Multiple  Allergies: No known drug allergies    Please refer to the intake H&P regarding the patient's review of systems, family history and social history as was done today    HEENT: Normal  Lungs: Clear to auscultation  Heart: RRR  Abdomen: Soft, nontender  Skin: clear  Extremity: She has obvious swelling around the second third fourth and fifth MCP joints.  She cannot flex the MCP joints past 40 degrees in the PIP joints past 60 degrees.  She is having trouble making a fist with no rotational deformity.  There are some mild tenderness around the A1 pulley of the fourth finger but no triggering.  She does not have any pain around the first extensor compartment.  No pain with basal joint ballottement.  Contralateral exam is normal for strength, motion, stability and neurovascular assessment.    Radiographs: Prior x-rays showed mild degenerative changes throughout the carpus and digits.    Assessment: Left MCP joint pain and swelling with possible systemic arthropathy, improved left de Quervain's tenosynovitis status post injection trial    Plan:.  Perianal actually doing much better.  She has more significant swelling around the third fourth and fifth MCP joints and is now losing motion and .  At this point I recommended a lab workup with a rheumatoid panel as well as evaluation by the rheumatology team.    She is willing to try some Celebrex to help with the pain and function.  She will stop if it causes any stomach upset.  She has seen rheumatology evaluation if her workup is negative.  We could also consider referral to the hand team as  needed.  All questions were answered today with the patient.    This note was generated with voice recognition software and may contain grammatical errors.

## 2024-05-15 DIAGNOSIS — G89.29 OTHER CHRONIC PAIN: ICD-10-CM

## 2024-05-16 RX ORDER — OXYCODONE AND ACETAMINOPHEN 5; 325 MG/1; MG/1
1 TABLET ORAL EVERY 4 HOURS PRN
Qty: 180 TABLET | Refills: 0 | Status: SHIPPED | OUTPATIENT
Start: 2024-05-16 | End: 2024-06-15

## 2024-05-19 NOTE — PROGRESS NOTES
Subjective   Patient ID: Bridget Hannon is a 62 y.o. female who presents for Eye Issue (Pt in today with c/o eye issue, redness, itching. Not sure if pink eye or allergies.).    Review of Systems  Denies N/V/D/C, no HA/S/V, denies rashes/lesions, no CP/SOB. Denies fevers/chills.  Positive for bilateral conjunctival injection, waking up with eyes crusty.  All other systems were negative.    Objective   Physical Exam  Gen: NAD  eyes: EOMI, PERRLA, no eyelids puffy, scleral injection.  ENT: hearing grossly intact, no nasal discharge  resp: CTABL, without R/R  heart: RRR without MRG  GI: abd: S/ND/NT, BS+  lymph: no axillary, cervical, supraclavicular lymphadenopathy noted   MS: gait grossly WNL,  derm: no rashes or lesions noted  neuro: CN II-XII grossly intact  psych: A&Ox3    Assessment/Plan            Bacterial conjunctivitis.  Started about 4 days ago, right eye, next day left eye was involved, is now waking with eyes crusted shut.  Has tried Visine allergy, and it helped a little. -->>  We will prescribe tobramycin eyedrops as well as erythromycin eye ointment.  Ointment can be used in the eyes or on the eyelids.    Management complicated by type 2 diabetes, however it has been very well controlled.    Chronic pain/pain management.  Due soon for refill, will refill Percocet for patient today.        Return in a few months as already scheduled.   71

## 2024-05-24 ENCOUNTER — LAB (OUTPATIENT)
Dept: LAB | Facility: LAB | Age: 64
End: 2024-05-24
Payer: MEDICARE

## 2024-05-24 DIAGNOSIS — M25.50 MULTIPLE JOINT PAIN: ICD-10-CM

## 2024-05-24 LAB
CRP SERPL-MCNC: <0.1 MG/DL
ERYTHROCYTE [SEDIMENTATION RATE] IN BLOOD BY WESTERGREN METHOD: 5 MM/H (ref 0–30)
RHEUMATOID FACT SER NEPH-ACNC: 10 IU/ML (ref 0–15)
URATE SERPL-MCNC: 4.5 MG/DL (ref 2.3–6.7)

## 2024-05-24 PROCEDURE — 86140 C-REACTIVE PROTEIN: CPT

## 2024-05-24 PROCEDURE — 84550 ASSAY OF BLOOD/URIC ACID: CPT

## 2024-05-24 PROCEDURE — 86431 RHEUMATOID FACTOR QUANT: CPT

## 2024-05-24 PROCEDURE — 36415 COLL VENOUS BLD VENIPUNCTURE: CPT

## 2024-05-24 PROCEDURE — 85652 RBC SED RATE AUTOMATED: CPT

## 2024-05-24 PROCEDURE — 86038 ANTINUCLEAR ANTIBODIES: CPT

## 2024-05-28 LAB — ANA SER QL HEP2 SUBST: NEGATIVE

## 2024-06-05 ENCOUNTER — APPOINTMENT (OUTPATIENT)
Dept: PRIMARY CARE | Facility: CLINIC | Age: 64
End: 2024-06-05
Payer: MEDICARE

## 2024-06-14 ENCOUNTER — APPOINTMENT (OUTPATIENT)
Dept: PRIMARY CARE | Facility: CLINIC | Age: 64
End: 2024-06-14
Payer: MEDICARE

## 2024-06-14 VITALS
WEIGHT: 152 LBS | SYSTOLIC BLOOD PRESSURE: 155 MMHG | OXYGEN SATURATION: 92 % | HEART RATE: 78 BPM | DIASTOLIC BLOOD PRESSURE: 89 MMHG | HEIGHT: 63 IN | BODY MASS INDEX: 26.93 KG/M2

## 2024-06-14 DIAGNOSIS — Z79.899 DRUG THERAPY: ICD-10-CM

## 2024-06-14 DIAGNOSIS — M79.604 BILATERAL LEG PAIN: ICD-10-CM

## 2024-06-14 DIAGNOSIS — E55.9 VITAMIN D DEFICIENCY, UNSPECIFIED: ICD-10-CM

## 2024-06-14 DIAGNOSIS — Z12.11 ENCOUNTER FOR SCREENING FOR MALIGNANT NEOPLASM OF COLON: ICD-10-CM

## 2024-06-14 DIAGNOSIS — G89.29 OTHER CHRONIC PAIN: ICD-10-CM

## 2024-06-14 DIAGNOSIS — Z00.00 PREVENTATIVE HEALTH CARE: ICD-10-CM

## 2024-06-14 DIAGNOSIS — Z13.9 SCREENING FOR CONDITION: ICD-10-CM

## 2024-06-14 DIAGNOSIS — R29.898 BILATERAL LEG WEAKNESS: Primary | ICD-10-CM

## 2024-06-14 DIAGNOSIS — G25.81 RLS (RESTLESS LEGS SYNDROME): ICD-10-CM

## 2024-06-14 DIAGNOSIS — I25.10 CORONARY ARTERY DISEASE INVOLVING NATIVE CORONARY ARTERY OF NATIVE HEART WITHOUT ANGINA PECTORIS: ICD-10-CM

## 2024-06-14 DIAGNOSIS — M79.605 BILATERAL LEG PAIN: ICD-10-CM

## 2024-06-14 DIAGNOSIS — E78.5 HYPERLIPIDEMIA, UNSPECIFIED HYPERLIPIDEMIA TYPE: ICD-10-CM

## 2024-06-14 DIAGNOSIS — R94.31 ABNORMAL EKG: ICD-10-CM

## 2024-06-14 DIAGNOSIS — E11.9 TYPE 2 DIABETES MELLITUS WITHOUT COMPLICATION, WITHOUT LONG-TERM CURRENT USE OF INSULIN (MULTI): ICD-10-CM

## 2024-06-14 DIAGNOSIS — Z98.84 BARIATRIC SURGERY STATUS: ICD-10-CM

## 2024-06-14 DIAGNOSIS — F32.A DEPRESSION, UNSPECIFIED DEPRESSION TYPE: ICD-10-CM

## 2024-06-14 DIAGNOSIS — I10 BENIGN ESSENTIAL HYPERTENSION: ICD-10-CM

## 2024-06-14 DIAGNOSIS — G89.4 CHRONIC PAIN SYNDROME: ICD-10-CM

## 2024-06-14 PROBLEM — M86.431: Status: RESOLVED | Noted: 2023-10-01 | Resolved: 2024-06-14

## 2024-06-14 PROBLEM — E66.01 PSYCHOLOGICAL FACTORS AFFECTING MORBID OBESITY (MULTI): Status: RESOLVED | Noted: 2023-10-01 | Resolved: 2024-06-14

## 2024-06-14 PROBLEM — F54 PSYCHOLOGICAL FACTORS AFFECTING MORBID OBESITY (MULTI): Status: RESOLVED | Noted: 2023-10-01 | Resolved: 2024-06-14

## 2024-06-14 PROCEDURE — 4010F ACE/ARB THERAPY RXD/TAKEN: CPT | Performed by: FAMILY MEDICINE

## 2024-06-14 PROCEDURE — 99396 PREV VISIT EST AGE 40-64: CPT | Performed by: FAMILY MEDICINE

## 2024-06-14 PROCEDURE — 99214 OFFICE O/P EST MOD 30 MIN: CPT | Performed by: FAMILY MEDICINE

## 2024-06-14 PROCEDURE — 3079F DIAST BP 80-89 MM HG: CPT | Performed by: FAMILY MEDICINE

## 2024-06-14 PROCEDURE — 3048F LDL-C <100 MG/DL: CPT | Performed by: FAMILY MEDICINE

## 2024-06-14 PROCEDURE — 3044F HG A1C LEVEL LT 7.0%: CPT | Performed by: FAMILY MEDICINE

## 2024-06-14 PROCEDURE — 3077F SYST BP >= 140 MM HG: CPT | Performed by: FAMILY MEDICINE

## 2024-06-14 RX ORDER — OXYCODONE AND ACETAMINOPHEN 5; 325 MG/1; MG/1
1 TABLET ORAL EVERY 4 HOURS PRN
Qty: 180 TABLET | Refills: 0 | Status: SHIPPED | OUTPATIENT
Start: 2024-06-16 | End: 2024-07-16

## 2024-06-14 NOTE — PATIENT INSTRUCTIONS
March 2025: Doing CSA and UDS and annual lab.  Today (June) will do annual preventative visit.  Around September will do Medicare wellness visit.       -------  Screen for breast cancer:. (Had hysterectomy around 2001). Had normal mammogram March 2022. -->>  Be sure to schedule your mammogram.     Colon cancer screening: Patient had in the home colon cancer screening done summer 2021. Was told it was negative. -->>  Will order Cologuard.    Hepatitis C screening was negative January 2022.     Immunization counseling: Tdap February 2022. Due for Shingles immunizations.  Will be due for flu shot end of summer.  Due for latest coronavirus booster, new RSV shot. -->> check with pharmacy to keep up-to-date on immunizations.     Cardiac CT scoring was 113, over read was negative. -->> We will plan to aggressively manage risk factors including LDL below 70.     -------       H/o Obesity, BMI 26, Down from maximum of 283 February 2020. Is post gastric bypass May 2021. -->>  Keep up the excellent work.     Chronic pain, right arm, low back, left shoulder, hip pain. R arm post many surgeries and injuries. They are discussing potential L arm surgery. Seeing Dr. Brando GRIFFIN. Is now seeing pain management/Dr. Meza. Pain mgt asked us to continue prescribe Percocet while they work on other modalities.   Is currently on 6 Percocet a day. Failed Voltaren gel (ineffective). On duloxetine, 60mg daily, which seems to be at least a little better than the 30 mg. Finding it at least somewhat helpful.  Patient has been referred to rheumatology for evaluation of rheumatologic disease. -->> Follow-up up with pain management, orthopedics, rheumatology as planned. CSA/UDS next due march 2025. Could consider trying over-the-counter capsaicin topical to see if that helps her pain.      Leg weakness and pain.  Had episode of bilateral lower extremities total loss of strength for 2-3 minutes. Next day had B/L legs very achy/sore x 2-3 days.  Seems  resolved at this time.  No previous similar episodes. -->>  Refer to neurology for evaluation and treatment.       regarding previous labs:     Next annual labs will be around February-march 2025.   September recheck vitamin D,    -Dehydrated -->> drink more water especially before labs.    - Hyperlipidemia: Strahl numbers are excellent on no meds.-->> Keep up the excellent work. Patient is now post gastric bypass surgery.    - type 2 diabetes: A1c is 4.9, was 4.9, 5.2, 5.1, 6.1, 5.9, 6.3, 6.9, 5.8. Patient does note she has significantly cut back on sugar. No longer on Metformin. Will recheck A1c in 12 months    - Vitamin D deficiency, 46, was 54, 62, 81, 75, 57, 56, 67, 54, 49, 52, 42, 51, 25, 21, goal is . Post gastric bypass. Patient is currently on 100,000 units weekly. -->> Increase to 3 pills a week for total of 150,000 units weekly.  We will recheck in about 6 months.    - Drug therapy -->> Plan to recheck annual labs around January 2022.      Depression, in general does well with sertraline. -->> Will refill as needed.     RLS, doing well 1 mg ropinirole TID. -->> We will refill as needed.     h/o CAD, abnormal EKG, PVCs.  Has previously seen Dr. Sanford.  Looks like he told patient does not need to follow-up unless she has any more problems.       Patient did quit smoking January 5, 2019. -->> Keep up the most excellent work.      - patient will return in about 3 months for [facility] and lab review and controlled substance refill.  -Patient will come in about a week before the appointment to get vitamin D level drawn.

## 2024-06-14 NOTE — PROGRESS NOTES
Subjective   Patient ID: Bridget Hannon is a 63 y.o. female who presents for 3 Month FU (Pt in today for 3 month FU).    Review of Systems   Respiratory:  Choking: bilateral leg pain.      Denies N/V/D/C, no HA/S/V, denies rashes/lesions, no CP/SOB. Denies fevers/chills.  All other systems were negative.  Positive for chronic pain multiple locations.    Objective   Physical Exam  Gen: NAD  eyes: EOMI, PERRLA  ENT: hearing grossly intact, no nasal discharge  resp: CTABL, without R/R  heart: RRR without MRG  GI: abd: S/ND/NT, BS+  lymph: no axillary, cervical, supraclavicular lymphadenopathy noted   MS: gait grossly WNL,  derm: no rashes or lesions noted  neuro: CN II-XII grossly intact  psych: A&Ox3    Assessment/Plan   Diagnoses and all orders for this visit:  Bilateral leg weakness  -     Referral to Neurology; Future  Bilateral leg pain  -     Referral to Neurology; Future  Encounter for screening for malignant neoplasm of colon  -     Cologuard® colon cancer screening; Future  Preventative health care  -     Cologuard® colon cancer screening; Future  Other chronic pain  Vitamin D deficiency, unspecified  -     Vitamin D 25-Hydroxy,Total (for eval of Vitamin D levels); Future  Type 2 diabetes mellitus without complication, without long-term current use of insulin (Multi)  Coronary artery disease involving native coronary artery of native heart without angina pectoris  Depression, unspecified depression type  RLS (restless legs syndrome)  Abnormal EKG  Benign essential hypertension  Bariatric surgery status  Hyperlipidemia, unspecified hyperlipidemia type  Chronic pain syndrome  Screening for condition  Drug therapy      Patient Discussion/Summary     March 2025: Doing CSA and UDS and annual lab.  Today (June) will do annual preventative visit.  Around September will do Medicare wellness visit.       -------  Screen for breast cancer:. (Had hysterectomy around 2001). Had normal mammogram March 2022. -->>  Be  sure to schedule your mammogram.     Colon cancer screening: Patient had in the home colon cancer screening done summer 2021. Was told it was negative. -->>  Will order Cologuard.    Hepatitis C screening was negative January 2022.     Immunization counseling: Tdap February 2022. Due for Shingles immunizations.  Will be due for flu shot end of summer.  Due for latest coronavirus booster, new RSV shot. -->> check with pharmacy to keep up-to-date on immunizations.     Cardiac CT scoring was 113, over read was negative. -->> We will plan to aggressively manage risk factors including LDL below 70.     -------       H/o Obesity, BMI 26, Down from maximum of 283 February 2020. Is post gastric bypass May 2021. -->>  Keep up the excellent work.     Chronic pain, right arm, low back, left shoulder, hip pain. R arm post many surgeries and injuries. They are discussing potential L arm surgery. Seeing Dr. Brando GRIFFIN. Is now seeing pain management/Dr. Meza. Pain mgt asked us to continue prescribe Percocet while they work on other modalities.   Is currently on 6 Percocet a day. Failed Voltaren gel (ineffective). On duloxetine, 60mg daily, which seems to be at least a little better than the 30 mg. Finding it at least somewhat helpful.  Patient has been referred to rheumatology for evaluation of rheumatologic disease. -->> Follow-up up with pain management, orthopedics, rheumatology as planned. CSA/UDS next due march 2025. Could consider trying over-the-counter capsaicin topical to see if that helps her pain.      Leg weakness and pain.  Had episode of bilateral lower extremities total loss of strength for 2-3 minutes. Next day had B/L legs very achy/sore x 2-3 days.  Seems resolved at this time.  No previous similar episodes. -->>  Refer to neurology for evaluation and treatment.       regarding previous labs:     Next annual labs will be around February-march 2025.   September recheck vitamin D,    -Dehydrated -->> drink more water  especially before labs.    - Hyperlipidemia: Strahl numbers are excellent on no meds.-->> Keep up the excellent work. Patient is now post gastric bypass surgery.    - type 2 diabetes: A1c is 4.9, was 4.9, 5.2, 5.1, 6.1, 5.9, 6.3, 6.9, 5.8. Patient does note she has significantly cut back on sugar. No longer on Metformin. Will recheck A1c in 12 months    - Vitamin D deficiency, 46, was 54, 62, 81, 75, 57, 56, 67, 54, 49, 52, 42, 51, 25, 21, goal is . Post gastric bypass. Patient is currently on 100,000 units weekly. -->> Increase to 3 pills a week for total of 150,000 units weekly.  We will recheck in about 6 months.    - Drug therapy -->> Plan to recheck annual labs around January 2022.      Depression, in general does well with sertraline. -->> Will refill as needed.     RLS, doing well 1 mg ropinirole TID. -->> We will refill as needed.     h/o CAD, abnormal EKG, PVCs.  Has previously seen Dr. Sanford.  Looks like he told patient does not need to follow-up unless she has any more problems.       Patient did quit smoking January 5, 2019. -->> Keep up the most excellent work.      - patient will return in about 3 months for [facility] and lab review and controlled substance refill.  -Patient will come in about a week before the appointment to get vitamin D level drawn.

## 2024-06-22 DIAGNOSIS — I10 HYPERTENSION, UNSPECIFIED TYPE: ICD-10-CM

## 2024-06-24 ENCOUNTER — APPOINTMENT (OUTPATIENT)
Dept: RHEUMATOLOGY | Facility: CLINIC | Age: 64
End: 2024-06-24
Payer: MEDICARE

## 2024-06-24 RX ORDER — LOSARTAN POTASSIUM 50 MG/1
TABLET ORAL
Qty: 100 TABLET | Refills: 2 | Status: SHIPPED | OUTPATIENT
Start: 2024-06-24

## 2024-07-07 LAB — NONINV COLON CA DNA+OCC BLD SCRN STL QL: NEGATIVE

## 2024-07-16 DIAGNOSIS — G89.29 OTHER CHRONIC PAIN: ICD-10-CM

## 2024-07-18 DIAGNOSIS — G25.81 RLS (RESTLESS LEGS SYNDROME): ICD-10-CM

## 2024-07-18 RX ORDER — OXYCODONE AND ACETAMINOPHEN 5; 325 MG/1; MG/1
1 TABLET ORAL EVERY 4 HOURS PRN
Qty: 180 TABLET | Refills: 0 | Status: SHIPPED | OUTPATIENT
Start: 2024-07-18 | End: 2024-08-17

## 2024-07-19 RX ORDER — ROPINIROLE 1 MG/1
TABLET, FILM COATED ORAL
Qty: 300 TABLET | Refills: 2 | Status: SHIPPED | OUTPATIENT
Start: 2024-07-19

## 2024-07-28 NOTE — PROGRESS NOTES
Subjective   Patient ID: [id number]   Bridget Hannon is a 63 y.o. female with L CTS, left sided sciatica, OA of the back, CAD, HTN, DL, DM 2, vit D def, obesity s/p bariatric surgery, apnea and migraines, who presents for hand pains, referred by ortho Dr. Ewing     Current rheum meds:  - Vitamin D 50,000 international units      Previous rheum meds:  -    HPI  Few months duration of pains in her hands  4 months ago, she was diagnosed with left DeQuervain's tenosynovitis, had an injection with Dr. Ewing and then things became worse  Started having swelling in her left hand where she had the injection  Reports discomfort in her shoulders and toes   Same kind of pains as her hands, but not as severe as her hands  Pains are worse with activity and at the end of the day   MS of around 5-10 minutes   Uses an arthritis cream and that helps  Uses oxycodone for back pain and helps relieve her hand pains   Tylenol helps a bit   No NSAIDs or steroids   Can't make a full fist of her left hand    PSH: Right arm surgery after an accident, right knee surgery   Allergies: NKDA   Habits: No smoking, no alcohol, no recreational drugs   Social hx: Single, 1 son, on disability     ROS:  Constitutional: Denies fever, chills, weight loss, night sweats or headaches  Eyes: Denies dry eyes, blurry vision, redness or pain  ENT: Denies dry mouth, dental loss, loss of taste, nasal or oral ulcers, jaw claudication, difficulty swallowing, nasal crusting or recurrent sinus infections   Cardiovascular: Denies chest pain, palpitations, orthopnea  Respiratory: Denies shortness of breath, cough, asthma, or recurrent respiratory infections  Gastrointestinal: Denies dysphagia, nausea, vomiting, heartburn, abdominal pain, constipation, diarrhea, melena or hematochezia  Genitourinary: No recurrent urinary infections or STDs, no genital or anal ulcers  Integumentary: Denies photosensitivity, rash or lesions, Raynaud's phenomenon, skin tightening,  digital ulcers, psoriatic lesions, or alopecia  Neurological: Denies any numbness or tingling, muscle weakness, or incontinence   Hematologic/Lymphatic: Denies bleeding, bruising, history of clots (arterial or venous), or abortions/miscarriages/pregnancy complications   MSK: No inflammatory back pain, enthesitis, dactylitis  Muscular: Denies weakness, difficulty rising from chair or combing the hair, muscle aches  FHx: No family history of autoimmune diseases     Patient Active Problem List   Diagnosis    Type 2 diabetes mellitus without complication, without long-term current use of insulin (Multi)    Coronary artery disease involving native heart without angina pectoris    Depression    RLS (restless legs syndrome)    Other chronic pain    Vitamin D deficiency    Abnormal EKG    Abnormal nuclear stress test    Absolute anemia    Arthropathy, traumatic, multiple joints    Bariatric surgery status    Benign essential hypertension    Carpal tunnel syndrome of left wrist    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    Elbow deformity    Excessive daytime sleepiness    Hyperlipidemia    Impingement syndrome of left shoulder    Insomnia    Migraine with aura and without status migrainosus, not intractable    Joint stiffness    Postoperative malabsorption ([facility])    S/P gastric bypass    Sleep apnea    Sleep disturbance    Spondylosis of lumbar region without myelopathy or radiculopathy    Chronic back pain    Chronic left shoulder pain    Chronic pain    Chronic left-sided low back pain with left-sided sciatica    De Quervain's tenosynovitis    Screening for condition    Drug therapy    Vitamin D deficiency, unspecified    Bilateral leg weakness    Bilateral leg pain      Past Medical History:   Diagnosis Date    Fever, unspecified 03/21/2017    Fever and chills    Osteomyelitis, unspecified (Multi) 05/25/2022    Osteomyelitis, forearm    Personal history of (healed) traumatic fracture  02/23/2022    History of fracture of radius    Personal history of Methicillin resistant Staphylococcus aureus infection 09/18/2019    History of methicillin resistant Staphylococcus aureus infection    Personal history of other diseases of the circulatory system     History of hypertension    Personal history of other diseases of the nervous system and sense organs     History of sleep apnea    Personal history of other endocrine, nutritional and metabolic disease     History of diabetes mellitus    Personal history of other mental and behavioral disorders     History of depression    Personal history of other specified conditions 09/01/2017    History of vertigo    Tobacco use 07/26/2022    Nicotine use     Past Surgical History:   Procedure Laterality Date    OTHER SURGICAL HISTORY  09/18/2020    Knee surgery    OTHER SURGICAL HISTORY  12/11/2021    Gastric bypass surgery    OTHER SURGICAL HISTORY  08/12/2020    Forearm surgery    OTHER SURGICAL HISTORY  08/12/2020    Hysterectomy      Social History     Socioeconomic History    Marital status:      Spouse name: Not on file    Number of children: Not on file    Years of education: Not on file    Highest education level: Not on file   Occupational History    Not on file   Tobacco Use    Smoking status: Former     Types: Cigarettes    Smokeless tobacco: Not on file   Substance and Sexual Activity    Alcohol use: Not on file    Drug use: Not on file    Sexual activity: Not on file   Other Topics Concern    Not on file   Social History Narrative    Not on file     Social Determinants of Health     Financial Resource Strain: Not on file   Food Insecurity: Not on file   Transportation Needs: Not on file   Physical Activity: Not on file   Stress: Not on file   Social Connections: Not on file   Intimate Partner Violence: Not on file   Housing Stability: Not on file      Allergies   Allergen Reactions    Phentermine Palpitations     Current Outpatient Medications:      acetaminophen (Tylenol) 325 mg tablet, Take 1 tablet (325 mg) by mouth every 6 hours if needed., Disp: , Rfl:     albuterol 90 mcg/actuation inhaler, Inhale 2 puffs. q 4-6H prn SOB and/or wheeze, Disp: , Rfl:     amitriptyline (Elavil) 25 mg tablet, Take 1 tablet (25 mg) by mouth as needed at bedtime., Disp: , Rfl:     cholecalciferol (Vitamin D-3) 50,000 unit capsule, 1 by mouth 3 times a week with food, for example Monday, Wednesday, Friday., Disp: 12 capsule, Rfl: 5    DULoxetine (Cymbalta) 60 mg DR capsule, TAKE 1 CAPSULE BY MOUTH ONCE  DAILY DO NOT CRUSH OR CHEW, Disp: 90 capsule, Rfl: 3    erythromycin (Romycin) 5 mg/gram (0.5 %) ophthalmic ointment, Apply 1/4 to 1/2 inch ribbon to bilateral eyes at bedtime.  Can also be applied to eyelids 2-3 times daily., Disp: 3.5 g, Rfl: 1    fluticasone (Flonase) 50 mcg/actuation nasal spray, 2 sprays each nostril twice daily for the first 3 days, thereafter 2 sprays each nostril once daily., Disp: 16 g, Rfl: 5    gabapentin (Neurontin) 800 mg tablet, TAKE 1 TABLET BY MOUTH 3 TIMES  DAILY, Disp: 270 tablet, Rfl: 3    guaiFENesin (Mucinex) 600 mg 12 hr tablet, 1 by mouth twice daily with large glass of water as needed to help cough up phlegm more easily.  Be sure to drink plenty of water throughout the day., Disp: 30 tablet, Rfl: 2    hydroCHLOROthiazide (HYDRODiuril) 25 mg tablet, Take 1 tablet (25 mg) by mouth once daily., Disp: , Rfl:     lidocaine (Lidoderm) 5 % patch, Apply once daily to affected area, leave there 12 hours, remove & discard patch within 12 hours or as directed by MD., Disp: 30 patch, Rfl: 1    losartan (Cozaar) 50 mg tablet, TAKE 1 TABLET BY MOUTH ONCE  DAILY AS DIRECTED, Disp: 100 tablet, Rfl: 2    melatonin 3 mg tablet, Take 1 tablet (3 mg) by mouth as needed at bedtime., Disp: , Rfl:     methocarbamol (Robaxin) 750 mg tablet, TAKE 2 TABLET twice today, once now and once before bed. Tomorrow take 1 tablets 4 times daily as needed., Disp: , Rfl:      omeprazole (PriLOSEC) 40 mg DR capsule, Take 1 capsule (40 mg) by mouth once daily., Disp: , Rfl:     oxyCODONE-acetaminophen (Percocet) 5-325 mg tablet, Take 1 tablet by mouth every 4 hours if needed for severe pain (7 - 10)., Disp: 180 tablet, Rfl: 0    rOPINIRole (Requip) 1 mg tablet, TAKE 1 TABLET BY MOUTH UP TO 3  TIMES DAILY AS NEEDED FOR  RESTLESS LEG SYMPTOMS, Disp: 300 tablet, Rfl: 2    sertraline (Zoloft) 100 mg tablet, TAKE 1 TABLET BY MOUTH  DAILY, Disp: 90 tablet, Rfl: 3    tiZANidine (Zanaflex) 4 mg tablet, Take 1 tablet (4 mg) by mouth as needed at bedtime., Disp: , Rfl:     tobramycin (Tobrex) 0.3 % ophthalmic solution, 1 to 2 drops bilaterally as needed every 2-3 hours the first 24 hours, thereafter 4 times daily as needed., Disp: 5 mL, Rfl: 1    valsartan (Diovan) 160 mg tablet, Take 1 tablet (160 mg) by mouth once daily., Disp: , Rfl:      Objective   Visit Vitals  /81   Pulse 86   Temp 36.7 °C (98.1 °F)   Resp 20   Wt 70.8 kg (156 lb)   BMI 27.63 kg/m²   Smoking Status Former   BSA 1.77 m²     Physical Exam:  General: AAOx3, Cooperative  Head: normocephalic, atraumatic, no hair loss   Eyes: EOMI, conjunctiva clear, sclera white, anicteric  Ears: hearing intact  Nose: no deformity, no crusting   Throat/Mouth: No oral deformities, no cheek swelling, mucosa appear moist, no oral ulcers noted or loss of dentition   Neck/Lymph: FROM, trachea midline  Lungs: chest expansion symmetric, clear to auscultation bilaterally. No wheezing, rhonchi, or stridor  Heart: S1, S2, RRR. No murmur or rub  Abdomen: Soft, non-tender without masses  Skin: No rashes, ulcers or photosensitive areas  MSK: Upper Extremities:  Hand/Fingers: TTP of left 4th and 5th MCPs. No erythema, edema or warmth at DIP, PIP, or MCP joints, FROM grossly. Poor hand  bilaterally. No nodules. Heberden's nodes   Wrists: No erythema, edema, warmth or tenderness at wrist, FROM grossly  Elbows:  Right elbow in a fixed position from  previous surgeries. No tenderness, edema, erythema or warmth at elbows, FROM grossly of the left elbow. No nodules   Shoulders: No edema, erythema, tenderness or warmth at shoulders. FROM  Lower Extremities:   Hips: No obvious deformities. No joint tenderness, normal ROM grossly. No trochanteric bursae TTP  Knees: Right knee scar. No tenderness, deformities, edema, rashes, or warmth, normal ROM grossly. No crepitus, no pes anserine bursa TTP   Ankles, feet: No deformities, tenderness, edema, erythema, ulceration, or warmth at the ankle or MTP/IP joints, normal ROM grossly  Spine: No spinal tenderness to palpation. No SI joint tenderness    Lab Results   Component Value Date    WBC 8.8 03/01/2024    HGB 15.0 03/01/2024    HCT 45.5 03/01/2024    MCV 95 03/01/2024     03/01/2024        Chemistry    Lab Results   Component Value Date/Time     03/01/2024 0819    K 4.4 03/01/2024 0819     03/01/2024 0819    CO2 27 03/01/2024 0819    BUN 13 03/01/2024 0819    CREATININE 0.56 03/01/2024 0819    Lab Results   Component Value Date/Time    CALCIUM 9.0 03/01/2024 0819    ALKPHOS 126 03/01/2024 0819    AST 14 03/01/2024 0819    ALT 15 03/01/2024 0819    BILITOT 0.5 03/01/2024 0819        Lab Results   Component Value Date    CRP <0.10 05/24/2024      Lab Results   Component Value Date    STEVEN Negative 05/24/2024    RF 10 05/24/2024    SEDRATE 5 05/24/2024     Lab Results   Component Value Date    NEUTROABS 5.03 03/01/2024      Lab Results   Component Value Date    FERRITIN 96 09/03/2020      Lab Results   Component Value Date    HEPCAB NONREACTIVE 01/15/2022      Lab Results   Component Value Date    ALT 15 03/01/2024    AST 14 03/01/2024    ALKPHOS 126 03/01/2024    BILITOT 0.5 03/01/2024     Lab Results   Component Value Date    URICACID 4.5 05/24/2024      Lab Results   Component Value Date    PTH 71.6 09/03/2020    CALCIUM 9.0 03/01/2024    PHOS 2.8 02/17/2020      === 07/24/23 ===  FINGERS & HAND  Moderate  multifocal osteoarthritis about the hand. No acute abnormality seen radiographically         Assessment/Plan    This is a 63 y.o. female with L CTS, left sided sciatica, OA of the back, CAD, HTN, DL, DM 2, vit D def, obesity s/p bariatric surgery, apnea and migraines, who presents for hand pains, referred by ortho Dr. Ewing     The patient is complaining of mechanical pains in her hands, shoulders and feet, worse in the hands with reported swelling in the left hand. Minimal MS. Has not tried steroids. Work up by Dr. Ewing is negative (RF, STEVEN, ESR, CRP). No imaging. No synovitis on exam. Will complete the work up     Labs:  5/24: ESR, CRP, UA wnl  RF, STEVEN neg  3/24: CBC, CMP, Mg, TSH, A1c, lipid panel, vitamin D wnl     Imaging:  No recent imaging     # Polyarthralgia, mainly the hands    - CCP today  - Xrays today  - US MSK of the left hand   - Will consider a steroid trial after US MSK   - I will inform the patient of any urgent results, if any    RTC in 1 month for discussion of results     Plan, including risks and benefits, was discussed with the patient, informed on how to reach us.     To schedule an appointment, call [phone]  To reach the rheumatology office, call [phone]    Tammie Dominique MD      Division of Rheumatology  [facility]

## 2024-07-31 ENCOUNTER — APPOINTMENT (OUTPATIENT)
Dept: RHEUMATOLOGY | Facility: CLINIC | Age: 64
End: 2024-07-31
Payer: MEDICARE

## 2024-07-31 VITALS
HEART RATE: 86 BPM | TEMPERATURE: 98.1 F | WEIGHT: 156 LBS | BODY MASS INDEX: 27.63 KG/M2 | DIASTOLIC BLOOD PRESSURE: 81 MMHG | RESPIRATION RATE: 20 BRPM | SYSTOLIC BLOOD PRESSURE: 138 MMHG

## 2024-07-31 DIAGNOSIS — M25.50 MULTIPLE JOINT PAIN: ICD-10-CM

## 2024-07-31 DIAGNOSIS — M25.50 POLYARTHRALGIA: Primary | ICD-10-CM

## 2024-07-31 PROCEDURE — 3079F DIAST BP 80-89 MM HG: CPT | Performed by: STUDENT IN AN ORGANIZED HEALTH CARE EDUCATION/TRAINING PROGRAM

## 2024-07-31 PROCEDURE — 3044F HG A1C LEVEL LT 7.0%: CPT | Performed by: STUDENT IN AN ORGANIZED HEALTH CARE EDUCATION/TRAINING PROGRAM

## 2024-07-31 PROCEDURE — 4010F ACE/ARB THERAPY RXD/TAKEN: CPT | Performed by: STUDENT IN AN ORGANIZED HEALTH CARE EDUCATION/TRAINING PROGRAM

## 2024-07-31 PROCEDURE — 3075F SYST BP GE 130 - 139MM HG: CPT | Performed by: STUDENT IN AN ORGANIZED HEALTH CARE EDUCATION/TRAINING PROGRAM

## 2024-07-31 PROCEDURE — 3048F LDL-C <100 MG/DL: CPT | Performed by: STUDENT IN AN ORGANIZED HEALTH CARE EDUCATION/TRAINING PROGRAM

## 2024-07-31 PROCEDURE — 99204 OFFICE O/P NEW MOD 45 MIN: CPT | Performed by: STUDENT IN AN ORGANIZED HEALTH CARE EDUCATION/TRAINING PROGRAM

## 2024-08-13 DIAGNOSIS — F32.A DEPRESSION, UNSPECIFIED DEPRESSION TYPE: ICD-10-CM

## 2024-08-13 DIAGNOSIS — G89.29 OTHER CHRONIC PAIN: ICD-10-CM

## 2024-08-13 RX ORDER — OXYCODONE AND ACETAMINOPHEN 5; 325 MG/1; MG/1
1 TABLET ORAL EVERY 4 HOURS PRN
Qty: 180 TABLET | Refills: 0 | Status: SHIPPED | OUTPATIENT
Start: 2024-08-17 | End: 2024-09-16

## 2024-08-14 RX ORDER — SERTRALINE HYDROCHLORIDE 100 MG/1
100 TABLET, FILM COATED ORAL DAILY
Qty: 90 TABLET | Refills: 3 | Status: SHIPPED | OUTPATIENT
Start: 2024-08-14

## 2024-08-21 ENCOUNTER — HOSPITAL ENCOUNTER (OUTPATIENT)
Dept: RADIOLOGY | Facility: HOSPITAL | Age: 64
Discharge: HOME | End: 2024-08-21
Payer: MEDICARE

## 2024-08-21 DIAGNOSIS — M25.50 POLYARTHRALGIA: ICD-10-CM

## 2024-08-21 DIAGNOSIS — M25.50 MULTIPLE JOINT PAIN: ICD-10-CM

## 2024-08-21 PROCEDURE — 73030 X-RAY EXAM OF SHOULDER: CPT | Mod: 50

## 2024-08-21 PROCEDURE — 73630 X-RAY EXAM OF FOOT: CPT | Mod: 50

## 2024-08-21 PROCEDURE — 73130 X-RAY EXAM OF HAND: CPT | Mod: 50

## 2024-08-24 ENCOUNTER — LAB (OUTPATIENT)
Dept: LAB | Facility: LAB | Age: 64
End: 2024-08-24
Payer: MEDICARE

## 2024-08-24 DIAGNOSIS — M25.50 MULTIPLE JOINT PAIN: ICD-10-CM

## 2024-08-24 DIAGNOSIS — M25.50 POLYARTHRALGIA: ICD-10-CM

## 2024-08-24 PROCEDURE — 36415 COLL VENOUS BLD VENIPUNCTURE: CPT

## 2024-08-24 PROCEDURE — 86200 CCP ANTIBODY: CPT

## 2024-08-25 LAB — CCP IGG SERPL-ACNC: <1 U/ML

## 2024-08-26 DIAGNOSIS — G89.29 OTHER CHRONIC PAIN: ICD-10-CM

## 2024-08-27 RX ORDER — DULOXETIN HYDROCHLORIDE 60 MG/1
60 CAPSULE, DELAYED RELEASE ORAL DAILY
Qty: 90 CAPSULE | Refills: 3 | Status: SHIPPED | OUTPATIENT
Start: 2024-08-27

## 2024-08-28 ENCOUNTER — APPOINTMENT (OUTPATIENT)
Dept: RHEUMATOLOGY | Facility: CLINIC | Age: 64
End: 2024-08-28
Payer: MEDICARE

## 2024-08-30 ENCOUNTER — APPOINTMENT (OUTPATIENT)
Dept: PRIMARY CARE | Facility: CLINIC | Age: 64
End: 2024-08-30
Payer: MEDICARE

## 2024-08-30 DIAGNOSIS — E55.9 VITAMIN D DEFICIENCY, UNSPECIFIED: ICD-10-CM

## 2024-08-30 LAB — 25(OH)D3 SERPL-MCNC: 39 NG/ML (ref 30–100)

## 2024-08-30 PROCEDURE — 82306 VITAMIN D 25 HYDROXY: CPT

## 2024-09-03 NOTE — PROGRESS NOTES
Subjective   Patient ID: [id number]   Bridget Hannon is a 63 y.o. female with L CTS, left sided sciatica, OA of the back, CAD, HTN, DL, DM 2, vit D def, obesity s/p bariatric surgery, apnea and migraines, who presents for hand pains follow up    Current rheum meds:  - Vitamin D 50,000 international units      Previous rheum meds:  -    HPI   Has a follow up with Dr. Roy as well    The patient is doing well  No morning stiffness, no painful joints or swelling   Compliant with meds  No side effects reported  No episodes of eye inflammation  No sicca sx  No chest pain, cough or dyspnea  No rashes  No infections    ROS:  As per HPI     Rheum hx:  Few months duration of pains in her hands  4 months ago, she was diagnosed with left DeQuervain's tenosynovitis, had an injection with Dr. Ewing and then things became worse  Started having swelling in her left hand where she had the injection  Reports discomfort in her shoulders and toes   Same kind of pains as her hands, but not as severe as her hands  Pains are worse with activity and at the end of the day   MS of around 5-10 minutes   Uses an arthritis cream and that helps  Uses oxycodone for back pain and helps relieve her hand pains   Tylenol helps a bit   No NSAIDs or steroids   Can't make a full fist of her left hand    PSH: Right arm surgery after an accident, right knee surgery   Allergies: NKDA   Habits: No smoking, no alcohol, no recreational drugs   Social hx: Single, 1 son, on disability   FHx: No family history of autoimmune diseases     Patient Active Problem List   Diagnosis    Type 2 diabetes mellitus without complication, without long-term current use of insulin (Multi)    Coronary artery disease involving native heart without angina pectoris    Depression    RLS (restless legs syndrome)    Other chronic pain    Vitamin D deficiency    Abnormal EKG    Abnormal nuclear stress test    Absolute anemia    Arthropathy, traumatic, multiple joints     Bariatric surgery status    Benign essential hypertension    Carpal tunnel syndrome of left wrist    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    Elbow deformity    Excessive daytime sleepiness    Hyperlipidemia    Impingement syndrome of left shoulder    Insomnia    Migraine with aura and without status migrainosus, not intractable    Joint stiffness    Postoperative malabsorption ([facility])    S/P gastric bypass    Sleep apnea    Sleep disturbance    Spondylosis of lumbar region without myelopathy or radiculopathy    Chronic back pain    Chronic left shoulder pain    Chronic pain    Chronic left-sided low back pain with left-sided sciatica    De Quervain's tenosynovitis    Screening for condition    Drug therapy    Vitamin D deficiency, unspecified    Bilateral leg weakness    Bilateral leg pain      Past Medical History:   Diagnosis Date    Fever, unspecified 03/21/2017    Fever and chills    Osteomyelitis, unspecified (Multi) 05/25/2022    Osteomyelitis, forearm    Personal history of (healed) traumatic fracture 02/23/2022    History of fracture of radius    Personal history of Methicillin resistant Staphylococcus aureus infection 09/18/2019    History of methicillin resistant Staphylococcus aureus infection    Personal history of other diseases of the circulatory system     History of hypertension    Personal history of other diseases of the nervous system and sense organs     History of sleep apnea    Personal history of other endocrine, nutritional and metabolic disease     History of diabetes mellitus    Personal history of other mental and behavioral disorders     History of depression    Personal history of other specified conditions 09/01/2017    History of vertigo    Tobacco use 07/26/2022    Nicotine use     Past Surgical History:   Procedure Laterality Date    OTHER SURGICAL HISTORY  09/18/2020    Knee surgery    OTHER SURGICAL HISTORY  12/11/2021    Gastric bypass surgery     OTHER SURGICAL HISTORY  08/12/2020    Forearm surgery    OTHER SURGICAL HISTORY  08/12/2020    Hysterectomy      Social History     Socioeconomic History    Marital status:      Spouse name: Not on file    Number of children: Not on file    Years of education: Not on file    Highest education level: Not on file   Occupational History    Not on file   Tobacco Use    Smoking status: Former     Types: Cigarettes    Smokeless tobacco: Not on file   Substance and Sexual Activity    Alcohol use: Not on file    Drug use: Not on file    Sexual activity: Not on file   Other Topics Concern    Not on file   Social History Narrative    Not on file     Social Determinants of Health     Financial Resource Strain: Not on file   Food Insecurity: Not on file   Transportation Needs: Not on file   Physical Activity: Not on file   Stress: Not on file   Social Connections: Not on file   Intimate Partner Violence: Not on file   Housing Stability: Not on file      Allergies   Allergen Reactions    Phentermine Palpitations     Current Outpatient Medications:     acetaminophen (Tylenol) 325 mg tablet, Take 1 tablet (325 mg) by mouth every 6 hours if needed., Disp: , Rfl:     albuterol 90 mcg/actuation inhaler, Inhale 2 puffs. q 4-6H prn SOB and/or wheeze, Disp: , Rfl:     amitriptyline (Elavil) 25 mg tablet, Take 1 tablet (25 mg) by mouth as needed at bedtime., Disp: , Rfl:     cholecalciferol (Vitamin D-3) 50,000 unit capsule, 1 by mouth 3 times a week with food, for example Monday, Wednesday, Friday., Disp: 12 capsule, Rfl: 5    DULoxetine (Cymbalta) 60 mg DR capsule, TAKE 1 CAPSULE BY MOUTH DAILY  (DO NOT CRUSH OR CHEW), Disp: 90 capsule, Rfl: 3    erythromycin (Romycin) 5 mg/gram (0.5 %) ophthalmic ointment, Apply 1/4 to 1/2 inch ribbon to bilateral eyes at bedtime.  Can also be applied to eyelids 2-3 times daily., Disp: 3.5 g, Rfl: 1    gabapentin (Neurontin) 800 mg tablet, TAKE 1 TABLET BY MOUTH 3 TIMES  DAILY, Disp: 270  tablet, Rfl: 3    hydroCHLOROthiazide (HYDRODiuril) 25 mg tablet, Take 1 tablet (25 mg) by mouth once daily., Disp: , Rfl:     lidocaine (Lidoderm) 5 % patch, Apply once daily to affected area, leave there 12 hours, remove & discard patch within 12 hours or as directed by MD., Disp: 30 patch, Rfl: 1    losartan (Cozaar) 50 mg tablet, TAKE 1 TABLET BY MOUTH ONCE  DAILY AS DIRECTED, Disp: 100 tablet, Rfl: 2    melatonin 3 mg tablet, Take 1 tablet (3 mg) by mouth as needed at bedtime., Disp: , Rfl:     methocarbamol (Robaxin) 750 mg tablet, TAKE 2 TABLET twice today, once now and once before bed. Tomorrow take 1 tablets 4 times daily as needed., Disp: , Rfl:     omeprazole (PriLOSEC) 40 mg DR capsule, Take 1 capsule (40 mg) by mouth once daily., Disp: , Rfl:     oxyCODONE-acetaminophen (Percocet) 5-325 mg tablet, Take 1 tablet by mouth every 4 hours if needed for severe pain (7 - 10). Do not fill before August 17, 2024., Disp: 180 tablet, Rfl: 0    rOPINIRole (Requip) 1 mg tablet, TAKE 1 TABLET BY MOUTH UP TO 3  TIMES DAILY AS NEEDED FOR  RESTLESS LEG SYMPTOMS, Disp: 300 tablet, Rfl: 2    sertraline (Zoloft) 100 mg tablet, TAKE 1 TABLET BY MOUTH DAILY, Disp: 90 tablet, Rfl: 3    tiZANidine (Zanaflex) 4 mg tablet, Take 1 tablet (4 mg) by mouth as needed at bedtime., Disp: , Rfl:     tobramycin (Tobrex) 0.3 % ophthalmic solution, 1 to 2 drops bilaterally as needed every 2-3 hours the first 24 hours, thereafter 4 times daily as needed., Disp: 5 mL, Rfl: 1    valsartan (Diovan) 160 mg tablet, Take 1 tablet (160 mg) by mouth once daily., Disp: , Rfl:      Objective   Visit Vitals  Smoking Status Former     Physical Exam:  General: AAOx3, Cooperative  Head: normocephalic, atraumatic, no hair loss   Eyes: EOMI, conjunctiva clear, sclera white, anicteric  Ears: hearing intact  Nose: no deformity, no crusting   Throat/Mouth: No oral deformities, no cheek swelling, mucosa appear moist, no oral ulcers noted or loss of dentition    Neck/Lymph: FROM, trachea midline  Lungs: chest expansion symmetric, clear to auscultation bilaterally. No wheezing, rhonchi, or stridor  Heart: S1, S2, RRR. No murmur or rub  Abdomen: Soft, non-tender without masses  Skin: No rashes, ulcers or photosensitive areas  MSK: Upper Extremities:  Hand/Fingers: TTP of left 4th and 5th MCPs. No erythema, edema or warmth at DIP, PIP, or MCP joints, FROM grossly. Poor hand  bilaterally. No nodules. Heberden's nodes   Wrists: No erythema, edema, warmth or tenderness at wrist, FROM grossly  Elbows:  Right elbow in a fixed position from previous surgeries. No tenderness, edema, erythema or warmth at elbows, FROM grossly of the left elbow. No nodules   Shoulders: No edema, erythema, tenderness or warmth at shoulders. FROM  Lower Extremities:   Hips: No obvious deformities. No joint tenderness, normal ROM grossly. No trochanteric bursae TTP  Knees: Right knee scar. No tenderness, deformities, edema, rashes, or warmth, normal ROM grossly. No crepitus, no pes anserine bursa TTP   Ankles, feet: No deformities, tenderness, edema, erythema, ulceration, or warmth at the ankle or MTP/IP joints, normal ROM grossly  Spine: No spinal tenderness to palpation. No SI joint tenderness    Lab Results   Component Value Date    WBC 8.8 03/01/2024    HGB 15.0 03/01/2024    HCT 45.5 03/01/2024    MCV 95 03/01/2024     03/01/2024        Chemistry    Lab Results   Component Value Date/Time     03/01/2024 0819    K 4.4 03/01/2024 0819     03/01/2024 0819    CO2 27 03/01/2024 0819    BUN 13 03/01/2024 0819    CREATININE 0.56 03/01/2024 0819    Lab Results   Component Value Date/Time    CALCIUM 9.0 03/01/2024 0819    ALKPHOS 126 03/01/2024 0819    AST 14 03/01/2024 0819    ALT 15 03/01/2024 0819    BILITOT 0.5 03/01/2024 0819        Lab Results   Component Value Date    CRP <0.10 05/24/2024      Lab Results   Component Value Date    STEVEN Negative 05/24/2024    RF 10 05/24/2024     SEDRATE 5 05/24/2024     Lab Results   Component Value Date    NEUTROABS 5.03 03/01/2024      Lab Results   Component Value Date    FERRITIN 96 09/03/2020      Lab Results   Component Value Date    HEPCAB NONREACTIVE 01/15/2022      Lab Results   Component Value Date    ALT 15 03/01/2024    AST 14 03/01/2024    ALKPHOS 126 03/01/2024    BILITOT 0.5 03/01/2024     Lab Results   Component Value Date    URICACID 4.5 05/24/2024      Lab Results   Component Value Date    PTH 71.6 09/03/2020    CALCIUM 9.0 03/01/2024    PHOS 2.8 02/17/2020      === 07/24/23 ===  FINGERS & HAND  Moderate multifocal osteoarthritis about the hand. No acute abnormality seen radiographically         Assessment/Plan    This is a 63 y.o. female with L CTS, left sided sciatica, OA of the back, CAD, HTN, DL, DM 2, vit D def, obesity s/p bariatric surgery, apnea and migraines, who presents for hand pains follow up  Last seen in 7/24    The patient is complaining of mechanical pains in her hands, shoulders and feet, worse in the hands with reported swelling in the left hand. Minimal MS. Has not tried steroids. Work up by Dr. Ewing is negative (RF, STEVEN, ESR, CRP). No imaging. No synovitis on exam. Will complete the work up     Labs:  8/24: Vitamin D wnl  CCP neg  5/24: ESR, CRP, UA wnl  RF, STEVEN neg  3/24: CBC, CMP, Mg, TSH, A1c, lipid panel, vitamin D wnl     Imaging:  US left hand:   1. Findings most compatible with a septated ganglion cyst of the palmar aspect of the hand superficial to the 3rd digit MCP joint at the approximate level of the A1 pulley.  2. Degenerative change of the hand and wrist in a pattern likely representing a combination of OA and CPPD arthropathy with small volume 2nd and 3rd digit MCP joint effusions with some synovial proliferation of the 3rd MCP joint with some adjacent  capsular/pericapsular hyperemia. Of note there is hook osteophyte formation of the 3rd metacarpal head which can be seen with CPPD arthropathy, but can  also be seen in  hemochromatosis and laboratory correlation is recommended.  3. Minimal focal tenosynovitis of the flexor tendon sheath to the 2nd digit at the level of the distal palm.      Xray shoulders: Moderate left shoulder degenerative changes. Some ossification of the left greater tuberosity soft tissues and likely previous impaction fracture similar prior study. More advanced degenerative changes on the right    Xray hands: Mild-to-moderate OA bilateral hands. Some arthritic changes of MCP joints with some soft tissue swelling and some possible subtle calcinosis at the 2nd and 3rd MCPs could suggest pseudogout    Xray feet: OA of the bilateral feet mostly midfoot and 1st MTP. Bilateral calcaneal spurs.    # Polyarthralgia, mainly the hands    - Colchicine   - Will consider a steroid trial after US MSK     RTC in    Plan, including risks and benefits, was discussed with the patient, informed on how to reach us.     To schedule an appointment, call [phone]  To reach the rheumatology office, call [phone]    Tammie Dominique MD      Division of Rheumatology  [facility]

## 2024-09-05 ENCOUNTER — HOSPITAL ENCOUNTER (OUTPATIENT)
Dept: RADIOLOGY | Facility: HOSPITAL | Age: 64
Discharge: HOME | End: 2024-09-05
Payer: MEDICARE

## 2024-09-05 DIAGNOSIS — M25.50 MULTIPLE JOINT PAIN: ICD-10-CM

## 2024-09-05 DIAGNOSIS — M25.50 POLYARTHRALGIA: ICD-10-CM

## 2024-09-05 PROCEDURE — 76881 US COMPL JOINT R-T W/IMG: CPT

## 2024-09-06 ENCOUNTER — APPOINTMENT (OUTPATIENT)
Dept: PRIMARY CARE | Facility: CLINIC | Age: 64
End: 2024-09-06
Payer: MEDICARE

## 2024-09-06 VITALS
HEIGHT: 63 IN | OXYGEN SATURATION: 94 % | SYSTOLIC BLOOD PRESSURE: 161 MMHG | HEART RATE: 69 BPM | WEIGHT: 152 LBS | DIASTOLIC BLOOD PRESSURE: 104 MMHG | BODY MASS INDEX: 26.93 KG/M2

## 2024-09-06 DIAGNOSIS — G89.29 CHRONIC BACK PAIN, UNSPECIFIED BACK LOCATION, UNSPECIFIED BACK PAIN LATERALITY: ICD-10-CM

## 2024-09-06 DIAGNOSIS — Z00.00 ROUTINE GENERAL MEDICAL EXAMINATION AT HEALTH CARE FACILITY: Primary | ICD-10-CM

## 2024-09-06 DIAGNOSIS — I25.10 CORONARY ARTERY DISEASE INVOLVING NATIVE CORONARY ARTERY OF NATIVE HEART WITHOUT ANGINA PECTORIS: ICD-10-CM

## 2024-09-06 DIAGNOSIS — Z98.84 BARIATRIC SURGERY STATUS: ICD-10-CM

## 2024-09-06 DIAGNOSIS — R94.31 ABNORMAL EKG: ICD-10-CM

## 2024-09-06 DIAGNOSIS — M79.605 BILATERAL LEG PAIN: ICD-10-CM

## 2024-09-06 DIAGNOSIS — E55.9 VITAMIN D DEFICIENCY, UNSPECIFIED: ICD-10-CM

## 2024-09-06 DIAGNOSIS — F32.A DEPRESSION, UNSPECIFIED DEPRESSION TYPE: ICD-10-CM

## 2024-09-06 DIAGNOSIS — E55.9 VITAMIN D DEFICIENCY: ICD-10-CM

## 2024-09-06 DIAGNOSIS — E78.5 HYPERLIPIDEMIA, UNSPECIFIED HYPERLIPIDEMIA TYPE: ICD-10-CM

## 2024-09-06 DIAGNOSIS — M11.20 CHONDROCALCINOSIS: Primary | ICD-10-CM

## 2024-09-06 DIAGNOSIS — E11.9 TYPE 2 DIABETES MELLITUS WITHOUT COMPLICATION, WITHOUT LONG-TERM CURRENT USE OF INSULIN (MULTI): ICD-10-CM

## 2024-09-06 DIAGNOSIS — E83.119 HEMOCHROMATOSIS, UNSPECIFIED HEMOCHROMATOSIS TYPE: ICD-10-CM

## 2024-09-06 DIAGNOSIS — I10 BENIGN ESSENTIAL HYPERTENSION: ICD-10-CM

## 2024-09-06 DIAGNOSIS — M54.9 CHRONIC BACK PAIN, UNSPECIFIED BACK LOCATION, UNSPECIFIED BACK PAIN LATERALITY: ICD-10-CM

## 2024-09-06 DIAGNOSIS — M79.604 BILATERAL LEG PAIN: ICD-10-CM

## 2024-09-06 DIAGNOSIS — M79.642 PAIN OF LEFT HAND: ICD-10-CM

## 2024-09-06 DIAGNOSIS — G89.29 OTHER CHRONIC PAIN: ICD-10-CM

## 2024-09-06 DIAGNOSIS — G25.81 RLS (RESTLESS LEGS SYNDROME): ICD-10-CM

## 2024-09-06 PROCEDURE — 3044F HG A1C LEVEL LT 7.0%: CPT | Performed by: FAMILY MEDICINE

## 2024-09-06 PROCEDURE — 4010F ACE/ARB THERAPY RXD/TAKEN: CPT | Performed by: FAMILY MEDICINE

## 2024-09-06 PROCEDURE — 90656 IIV3 VACC NO PRSV 0.5 ML IM: CPT | Performed by: FAMILY MEDICINE

## 2024-09-06 PROCEDURE — 3048F LDL-C <100 MG/DL: CPT | Performed by: FAMILY MEDICINE

## 2024-09-06 PROCEDURE — 3008F BODY MASS INDEX DOCD: CPT | Performed by: FAMILY MEDICINE

## 2024-09-06 PROCEDURE — G0008 ADMIN INFLUENZA VIRUS VAC: HCPCS | Performed by: FAMILY MEDICINE

## 2024-09-06 PROCEDURE — G0439 PPPS, SUBSEQ VISIT: HCPCS | Performed by: FAMILY MEDICINE

## 2024-09-06 PROCEDURE — 3080F DIAST BP >= 90 MM HG: CPT | Performed by: FAMILY MEDICINE

## 2024-09-06 PROCEDURE — 3077F SYST BP >= 140 MM HG: CPT | Performed by: FAMILY MEDICINE

## 2024-09-06 PROCEDURE — 99214 OFFICE O/P EST MOD 30 MIN: CPT | Performed by: FAMILY MEDICINE

## 2024-09-06 RX ORDER — OXYCODONE AND ACETAMINOPHEN 5; 325 MG/1; MG/1
1 TABLET ORAL EVERY 4 HOURS PRN
Qty: 180 TABLET | Refills: 0 | Status: SHIPPED | OUTPATIENT
Start: 2024-09-16 | End: 2024-10-16

## 2024-09-06 ASSESSMENT — ACTIVITIES OF DAILY LIVING (ADL)
TAKING_MEDICATION: INDEPENDENT
DRESSING: INDEPENDENT
BATHING: INDEPENDENT
DOING_HOUSEWORK: INDEPENDENT
MANAGING_FINANCES: INDEPENDENT
GROCERY_SHOPPING: INDEPENDENT

## 2024-09-06 ASSESSMENT — PATIENT HEALTH QUESTIONNAIRE - PHQ9
1. LITTLE INTEREST OR PLEASURE IN DOING THINGS: NOT AT ALL
2. FEELING DOWN, DEPRESSED OR HOPELESS: SEVERAL DAYS
10. IF YOU CHECKED OFF ANY PROBLEMS, HOW DIFFICULT HAVE THESE PROBLEMS MADE IT FOR YOU TO DO YOUR WORK, TAKE CARE OF THINGS AT HOME, OR GET ALONG WITH OTHER PEOPLE: SOMEWHAT DIFFICULT
SUM OF ALL RESPONSES TO PHQ9 QUESTIONS 1 AND 2: 1

## 2024-09-06 ASSESSMENT — ENCOUNTER SYMPTOMS
DEPRESSION: 0
OCCASIONAL FEELINGS OF UNSTEADINESS: 0
LOSS OF SENSATION IN FEET: 0

## 2024-09-06 NOTE — PROGRESS NOTES
Subjective   Patient ID: Bridget Hannon is a 63 y.o. female who presents for MCW / Lab REview (Pt in today for her Medicare Wellness / lab review FU).    Review of Systems   Respiratory:  Choking: bilateral leg pain.      Denies N/V/D/C, no HA/S/V, denies rashes/lesions, no CP/SOB. Denies fevers/chills.  All other systems were negative.  Positive for chronic pain multiple locations.    Objective   Physical Exam  Gen: NAD  eyes: EOMI, PERRLA  ENT: hearing grossly intact, no nasal discharge  resp: CTABL, without R/R  heart: RRR without MRG  GI: abd: S/ND/NT, BS+  lymph: no axillary, cervical, supraclavicular lymphadenopathy noted   MS: gait grossly WNL,  derm: no rashes or lesions noted  neuro: CN II-XII grossly intact  psych: A&Ox3    Assessment/Plan   There are no diagnoses linked to this encounter.      Patient Discussion/Summary     March 2025: Doing CSA and UDS and annual lab.  2025 will be next annual preventative visit.  MCWV today.     -------  Screen for breast cancer:. (Had hysterectomy around 2001). Had normal mammogram March 2022. -->>  Be sure to schedule your mammogram.     Colon cancer screening: Next cologard due July 2027    Hepatitis C screening was negative January 2022.     Immunization counseling: Tdap February 2022. Due for Shingles immunizations.  Due for annual flu shot.  Due for latest coronavirus booster, new RSV shot. -->>  Will administer flu shot today.  Check with pharmacy to keep up-to-date on immunizations.     Cardiac CT scoring was 113, over read was negative. -->> We will plan to aggressively manage risk factors including LDL below 70.     -------      H/o Obesity, BMI 26, Down from maximum of 283 February 2020. Is post gastric bypass May 2021. -->>  Keep up the excellent work.       Chronic pain, right arm, low back, left shoulder, hip pain. R arm post many surgeries and injuries.  Pain worse recently.  They are discussing potential L arm surgery. Seeing Dr. Brando JARRETT Is now  seeing pain management/Dr. Meza. Pain mgt asked us to continue prescribe Percocet while they work on other modalities.   Is currently on 6 Percocet a day. Failed Voltaren gel (ineffective). On duloxetine, 60mg daily, which seems to be at least a little better than the 30 mg. Finding it at least somewhat helpful.  Seeing rheumatology, Dr. Dominique.  Patient is also getting appointment scheduled with rheumatologist closer. -->> Follow-up up with pain management, orthopedics, rheumatology as planned. CSA/UDS next due march 2025. Could consider trying over-the-counter capsaicin topical to see if that helps her pain. Am prescribing MALOLAM LP topical from the compounding pharmacy also.    New vitamin D reviewed.    - Vitamin D deficiency, 39, was 46, 54, 62, 81, 75, 57, 56, 67, 54, 49, 52, 42, 51, 25, 21, goal is . Post gastric bypass. Patient is currently on 100,000 units weekly. -->> Increase to 3 pills a week for total of 150,000 units weekly.  We will recheck in about 6 months with next annual labs.  regarding previous labs:       Regarding previous labs:    -Dehydrated -->> drink more water especially before labs.    - Hyperlipidemia: numbers are excellent on no meds.-->> Keep up the excellent work. Patient is now post gastric bypass surgery.    - type 2 diabetes: A1c is 4.9, was 4.9, 5.2, 5.1, 6.1, 5.9, 6.3, 6.9, 5.8. Patient does note she has significantly cut back on sugar. No longer on Metformin. Will recheck A1c in 12 months    - Drug therapy -->> Plan to recheck annual labs early 2025.      Depression, in general does well with sertraline plus duloxetine. -->> Will refill as needed.  Recommend finding videos on meditation or visualizations and spend at least 5 to 15 minutes every day.     RLS, doing well 1 mg ropinirole TID. -->> We will refill as needed.     abnormal EKG, PVCs. Has previously seen Dr. Sanford.  Looks like he told patient does not need to follow-up unless she has any more problems.   Healthy abnormal EKG was due to lead placement.  He did not mention coronary artery disease in his note though I am deleting that from patient's list as we have nothing on finding proving coronary artery disease.     Patient did quit smoking January 5, 2019. -->> Keep up the most excellent work.      - Will administer flu shot today.  - patient will return in about 3 months for controlled substance refill.

## 2024-09-06 NOTE — PATIENT INSTRUCTIONS
March 2025: Doing CSA and UDS and annual lab.  2025 will be next annual preventative visit.  MCWV today.     -------  Screen for breast cancer:. (Had hysterectomy around 2001). Had normal mammogram March 2022. -->>  Be sure to schedule your mammogram.     Colon cancer screening: Next cologard due July 2027    Hepatitis C screening was negative January 2022.     Immunization counseling: Tdap February 2022. Due for Shingles immunizations.  Due for annual flu shot.  Due for latest coronavirus booster, new RSV shot. -->>  Will administer flu shot today.  Check with pharmacy to keep up-to-date on immunizations.     Cardiac CT scoring was 113, over read was negative. -->> We will plan to aggressively manage risk factors including LDL below 70.     -------      H/o Obesity, BMI 26, Down from maximum of 283 February 2020. Is post gastric bypass May 2021. -->>  Keep up the excellent work.       Chronic pain, right arm, low back, left shoulder, hip pain. R arm post many surgeries and injuries.  Pain worse recently.  They are discussing potential L arm surgery. Seeing Dr. Brando GRIFFIN. Is now seeing pain management/Dr. Meza. Pain mgt asked us to continue prescribe Percocet while they work on other modalities.   Is currently on 6 Percocet a day. Failed Voltaren gel (ineffective). On duloxetine, 60mg daily, which seems to be at least a little better than the 30 mg. Finding it at least somewhat helpful.  Seeing rheumatology, Dr. Dominique.  Patient is also getting appointment scheduled with rheumatologist closer. -->> Follow-up up with pain management, orthopedics, rheumatology as planned. CSA/UDS next due march 2025. Could consider trying over-the-counter capsaicin topical to see if that helps her pain. Am prescribing MALOLAM LP topical from the compounding pharmacy also.    New vitamin D reviewed.    - Vitamin D deficiency, 39, was 46, 54, 62, 81, 75, 57, 56, 67, 54, 49, 52, 42, 51, 25, 21, goal is . Post gastric bypass. Patient  is currently on 100,000 units weekly. -->> Increase to 3 pills a week for total of 150,000 units weekly.  We will recheck in about 6 months with next annual labs.  regarding previous labs:       Regarding previous labs:    -Dehydrated -->> drink more water especially before labs.    - Hyperlipidemia: numbers are excellent on no meds.-->> Keep up the excellent work. Patient is now post gastric bypass surgery.    - type 2 diabetes: A1c is 4.9, was 4.9, 5.2, 5.1, 6.1, 5.9, 6.3, 6.9, 5.8. Patient does note she has significantly cut back on sugar. No longer on Metformin. Will recheck A1c in 12 months    - Drug therapy -->> Plan to recheck annual labs early 2025.      Depression, in general does well with sertraline plus duloxetine. -->> Will refill as needed.  Recommend finding videos on meditation or visualizations and spend at least 5 to 15 minutes every day.     RLS, doing well 1 mg ropinirole TID. -->> We will refill as needed.     abnormal EKG, PVCs. Has previously seen Dr. Sanford.  Looks like he told patient does not need to follow-up unless she has any more problems.  Healthy abnormal EKG was due to lead placement.  He did not mention coronary artery disease in his note though I am deleting that from patient's list as we have nothing on finding proving coronary artery disease.     Patient did quit smoking January 5, 2019. -->> Keep up the most excellent work.      - Will administer flu shot today.  - patient will return in about 3 months for controlled substance refill.

## 2024-09-09 ENCOUNTER — TELEPHONE (OUTPATIENT)
Dept: ORTHOPEDIC SURGERY | Facility: CLINIC | Age: 64
End: 2024-09-09
Payer: MEDICARE

## 2024-09-09 NOTE — TELEPHONE ENCOUNTER
Pt RICKM, stated she was referred to Rheumatology, she is asking if Dr. Ewing will look over the report from her US MSK and call her back to discuss.

## 2024-09-10 ENCOUNTER — OFFICE VISIT (OUTPATIENT)
Dept: ORTHOPEDIC SURGERY | Facility: CLINIC | Age: 64
End: 2024-09-10
Payer: MEDICARE

## 2024-09-10 VITALS — HEIGHT: 60 IN | BODY MASS INDEX: 28.66 KG/M2 | WEIGHT: 146 LBS

## 2024-09-10 DIAGNOSIS — M79.642 PAIN OF LEFT HAND: ICD-10-CM

## 2024-09-10 DIAGNOSIS — R22.30 MASS OF PALM: ICD-10-CM

## 2024-09-10 DIAGNOSIS — M65.30 ACQUIRED TRIGGER FINGER: Primary | ICD-10-CM

## 2024-09-10 PROCEDURE — 4010F ACE/ARB THERAPY RXD/TAKEN: CPT | Performed by: ORTHOPAEDIC SURGERY

## 2024-09-10 PROCEDURE — 3008F BODY MASS INDEX DOCD: CPT | Performed by: ORTHOPAEDIC SURGERY

## 2024-09-10 PROCEDURE — 99214 OFFICE O/P EST MOD 30 MIN: CPT | Performed by: ORTHOPAEDIC SURGERY

## 2024-09-10 PROCEDURE — 3044F HG A1C LEVEL LT 7.0%: CPT | Performed by: ORTHOPAEDIC SURGERY

## 2024-09-10 PROCEDURE — 3048F LDL-C <100 MG/DL: CPT | Performed by: ORTHOPAEDIC SURGERY

## 2024-09-10 NOTE — H&P (VIEW-ONLY)
"History present illness: Patient presents today for evaluation of complex pain to the left hand.  She feels like her \"whole hand hurts\".  Her main problem however is 1 of mass painful in nature to the midline palm just proximal to A1 pulley to left long finger.  She has difficulties with active finger flexion.  She is status post complex trauma to the right upper extremity with permanent functional deficit.  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  Otherwise healthy.  No blood thinners.  No trauma to the left palm.      Past medical history: The patient's past medical history, family history, social history, and review of systems were documented on the patient medical intake.  The updated data was reviewed in the electronic medical record.  History is negative except otherwise stated in history of present illness.        Physical examination:  General: Alert and oriented to person, place, and time.  No acute distress and breathing comfortably: Pleasant and cooperative with examination.  HEENT: Head is normocephalic and atraumatic.  Neck: Supple, no visible swelling.  Cardiovascular: No palpable tachycardia  Lungs: No audible wheezing or labored breathing  Abdomen: Nondistended.  Extremities: Evaluation of the left upper extremity finds the patient had palpable radial artery at the wrist with brisk capillary refill to all digits.  Patient has intact sensation to axillary radial median and ulnar nerves.  There are no open wounds.  There are no signs of infection.  There is no evidence of lymphedema or lymphatic streaking.  The patient has supple compartments to left arm forearm and hand.  Focally tender mass approximately 1.5 cm in maximal dimension in the midline palm overlying leading edge A1 pulley left long finger.  Pulm the pulp distance is approximately 4 cm with maximum active flexion of the left long " finger.      Radiology:      Assessment: Left long finger trigger finger with associated mass      Plan: Treatment options were discussed.  We explained the greatest likelihood is that this mass represents a large flexor retinacular sheath cyst.  She was offered the option of advanced imaging to further scrutinize versus open exploration with surgical resection.  The patient has a strong preference for open exploration with surgical resection of the mass and also trigger finger release as likely friction and swelling around the pulley system has given rise to the mass.  Plan for wide-awake approach to anesthesia with target for surgery later this week.  She is highly symptomatic.  We like to get this fixed for her soon as possible.        Procedure:

## 2024-09-16 ENCOUNTER — APPOINTMENT (OUTPATIENT)
Dept: RHEUMATOLOGY | Facility: CLINIC | Age: 64
End: 2024-09-16
Payer: MEDICARE

## 2024-09-16 DIAGNOSIS — M19.042 PRIMARY OSTEOARTHRITIS OF BOTH HANDS: ICD-10-CM

## 2024-09-16 DIAGNOSIS — M19.041 PRIMARY OSTEOARTHRITIS OF BOTH HANDS: ICD-10-CM

## 2024-09-16 DIAGNOSIS — M19.011 PRIMARY OSTEOARTHRITIS OF BOTH SHOULDERS: ICD-10-CM

## 2024-09-16 DIAGNOSIS — M25.50 POLYARTHRALGIA: Primary | ICD-10-CM

## 2024-09-16 DIAGNOSIS — M19.071 PRIMARY OSTEOARTHRITIS OF BOTH FEET: ICD-10-CM

## 2024-09-16 DIAGNOSIS — M19.012 PRIMARY OSTEOARTHRITIS OF BOTH SHOULDERS: ICD-10-CM

## 2024-09-16 DIAGNOSIS — M19.072 PRIMARY OSTEOARTHRITIS OF BOTH FEET: ICD-10-CM

## 2024-09-16 PROBLEM — M67.844: Status: ACTIVE | Noted: 2024-09-16

## 2024-09-16 PROBLEM — M65.332 TRIGGER FINGER, LEFT MIDDLE FINGER: Status: ACTIVE | Noted: 2024-09-16

## 2024-09-17 RX ORDER — TRAMADOL HYDROCHLORIDE 50 MG/1
50 TABLET ORAL EVERY 8 HOURS PRN
Qty: 10 TABLET | Refills: 0 | Status: SHIPPED | OUTPATIENT
Start: 2024-09-25 | End: 2024-09-29

## 2024-09-17 NOTE — DISCHARGE INSTRUCTIONS
Medication given may have significant effects after discharge. Therefore on the day of surgery:  1) You must be accompanied by a responsible adult upon discharge and for 24 hours after surgery.  Do not drive a motor vehicle, operate machinery, power tools or appliance, drink alcoholic beverages, or make critical decisions for 24 hours.  2) Be aware of dizziness, which may cause a fall. Change positions slowly.  3) Eating: you may resume your regular diet but it is better to increase intake slowly with mild foods and working up to your regular diet. No greasy, fried or spicy foods today.  4) Nausea/Vomiting: Nausea and vomiting may occur as you become more active or begin to increase food intake. If this should happen, decrease activity and return to liquids. If the problem persists, call your surgeon  5) Pain: Your surgeon may have given you a prescription for pain medication. Take pain medication with food as prescribed. Pain medication may cause constipation, so drink plenty of fluids. If your pain medication does not provide adequate relief, call your surgeon  6) Urinating: Notify your surgeon if you have not urinated within 12 hours after discharge  7) Ice: Apply ice to operative site for 20 min 5-6 times a day or use Polar care as instructed  8) Dressing:   [x]  Remove dressing in 3 Days   [x]  Leave open to air or apply simple Band-Aid after initial dressing is taken off and incision is dry. (If Steri-Strips are applied, leave them in place.)   [x]  No baths, hots tubs, pools, or submerging in fresh water sources. Okay to begin showering and normal hand washing after dressing removal.     []  Leave dressing in place. Keep dressing/ incision clean and dry.      9) Activity:    Shoulder/ Elbow/Hand:   [x]  Elevate extremity    []  Sling   [] At all times (except for exercises and showering)  [] As needed only for comfort   [] Begin daily motion exercises out of sling as instructed   [x]  Bend and flex  fingers/wrist/elbow frequently   [] Non-weight bearing/No lifting/gripping/squeezing to the surgical limb   [x] No lifting greater than 1 lb until follow-up visit      10) Begin physical therapy if advised by your physician:   [] Before returning to see you doctor    [x] Will discuss possible need at follow-up visit   [] Will be paired with your follow-up visit in [place]    11) Call your doctor at [phone] for an appointment (or follow up as scheduled)    Contact [facility] office if  Increased redness, swelling, drainage of any kind, and/or pain to surgery site.  As well as new onset fevers and or chills.  These could signify an infection.  Calf or thigh tenderness to touch as well as increased swelling or redness.  This could signify a clot formation.  Numbness or tingling to an area around the incision site or below the incision site (toes). Or if the operative extremity becomes cold, blue.  Any rash appears, increased  or new onset nausea/vomiting occur.  This may indicate a reaction to a medication.  Temp is 38.5 C (101F)  12) If you have any concerns or questions, please call [facility] surgeon on call. The 24- hour phone is [phone]  13) If you are unable to contact your surgeon, in an emergency situation, go to the nearest hospital

## 2024-09-24 ENCOUNTER — APPOINTMENT (OUTPATIENT)
Dept: RHEUMATOLOGY | Facility: CLINIC | Age: 64
End: 2024-09-24
Payer: MEDICARE

## 2024-09-25 ENCOUNTER — TELEPHONE (OUTPATIENT)
Dept: ORTHOPEDIC SURGERY | Facility: CLINIC | Age: 64
End: 2024-09-25

## 2024-09-25 ENCOUNTER — ANESTHESIA EVENT (OUTPATIENT)
Dept: OPERATING ROOM | Facility: HOSPITAL | Age: 64
End: 2024-09-25
Payer: MEDICARE

## 2024-09-25 ENCOUNTER — HOSPITAL ENCOUNTER (OUTPATIENT)
Facility: HOSPITAL | Age: 64
Setting detail: OUTPATIENT SURGERY
Discharge: HOME | End: 2024-09-25
Attending: ORTHOPAEDIC SURGERY | Admitting: ORTHOPAEDIC SURGERY
Payer: MEDICARE

## 2024-09-25 ENCOUNTER — ANESTHESIA (OUTPATIENT)
Dept: OPERATING ROOM | Facility: HOSPITAL | Age: 64
End: 2024-09-25
Payer: MEDICARE

## 2024-09-25 VITALS
HEART RATE: 76 BPM | DIASTOLIC BLOOD PRESSURE: 77 MMHG | SYSTOLIC BLOOD PRESSURE: 143 MMHG | RESPIRATION RATE: 17 BRPM | TEMPERATURE: 99.3 F | BODY MASS INDEX: 26.93 KG/M2 | OXYGEN SATURATION: 95 % | WEIGHT: 152 LBS | HEIGHT: 63 IN

## 2024-09-25 DIAGNOSIS — M65.332 TRIGGER FINGER, LEFT MIDDLE FINGER: ICD-10-CM

## 2024-09-25 DIAGNOSIS — M67.844 OTHER SPECIFIED DISORDERS OF TENDON, LEFT HAND: ICD-10-CM

## 2024-09-25 DIAGNOSIS — G89.18 POST-OP PAIN: Primary | ICD-10-CM

## 2024-09-25 PROCEDURE — 3700000002 HC GENERAL ANESTHESIA TIME - EACH INCREMENTAL 1 MINUTE: Performed by: ORTHOPAEDIC SURGERY

## 2024-09-25 PROCEDURE — A26055 PR INCISE FINGER TENDON SHEATH: Performed by: ANESTHESIOLOGY

## 2024-09-25 PROCEDURE — 7100000010 HC PHASE TWO TIME - EACH INCREMENTAL 1 MINUTE: Performed by: ORTHOPAEDIC SURGERY

## 2024-09-25 PROCEDURE — A26055 PR INCISE FINGER TENDON SHEATH: Performed by: ANESTHESIOLOGIST ASSISTANT

## 2024-09-25 PROCEDURE — 26055 INCISE FINGER TENDON SHEATH: CPT | Performed by: ORTHOPAEDIC SURGERY

## 2024-09-25 PROCEDURE — 26116 EXC HAND TUM DEEP < 1.5 CM: CPT | Performed by: ORTHOPAEDIC SURGERY

## 2024-09-25 PROCEDURE — 88304 TISSUE EXAM BY PATHOLOGIST: CPT | Mod: TC,STJLAB | Performed by: ORTHOPAEDIC SURGERY

## 2024-09-25 PROCEDURE — 7100000009 HC PHASE TWO TIME - INITIAL BASE CHARGE: Performed by: ORTHOPAEDIC SURGERY

## 2024-09-25 PROCEDURE — 3600000003 HC OR TIME - INITIAL BASE CHARGE - PROCEDURE LEVEL THREE: Performed by: ORTHOPAEDIC SURGERY

## 2024-09-25 PROCEDURE — 2720000007 HC OR 272 NO HCPCS: Performed by: ORTHOPAEDIC SURGERY

## 2024-09-25 PROCEDURE — 2500000004 HC RX 250 GENERAL PHARMACY W/ HCPCS (ALT 636 FOR OP/ED): Performed by: ANESTHESIOLOGIST ASSISTANT

## 2024-09-25 PROCEDURE — 3600000008 HC OR TIME - EACH INCREMENTAL 1 MINUTE - PROCEDURE LEVEL THREE: Performed by: ORTHOPAEDIC SURGERY

## 2024-09-25 PROCEDURE — 2500000004 HC RX 250 GENERAL PHARMACY W/ HCPCS (ALT 636 FOR OP/ED): Performed by: PHYSICIAN ASSISTANT

## 2024-09-25 PROCEDURE — 3700000001 HC GENERAL ANESTHESIA TIME - INITIAL BASE CHARGE: Performed by: ORTHOPAEDIC SURGERY

## 2024-09-25 RX ORDER — SODIUM CHLORIDE, SODIUM LACTATE, POTASSIUM CHLORIDE, CALCIUM CHLORIDE 600; 310; 30; 20 MG/100ML; MG/100ML; MG/100ML; MG/100ML
20 INJECTION, SOLUTION INTRAVENOUS CONTINUOUS
Status: DISCONTINUED | OUTPATIENT
Start: 2024-09-25 | End: 2024-09-25 | Stop reason: HOSPADM

## 2024-09-25 SDOH — HEALTH STABILITY: MENTAL HEALTH: CURRENT SMOKER: 0

## 2024-09-25 ASSESSMENT — PAIN - FUNCTIONAL ASSESSMENT
PAIN_FUNCTIONAL_ASSESSMENT: 0-10

## 2024-09-25 ASSESSMENT — COLUMBIA-SUICIDE SEVERITY RATING SCALE - C-SSRS
1. IN THE PAST MONTH, HAVE YOU WISHED YOU WERE DEAD OR WISHED YOU COULD GO TO SLEEP AND NOT WAKE UP?: NO
2. HAVE YOU ACTUALLY HAD ANY THOUGHTS OF KILLING YOURSELF?: NO
6. HAVE YOU EVER DONE ANYTHING, STARTED TO DO ANYTHING, OR PREPARED TO DO ANYTHING TO END YOUR LIFE?: NO

## 2024-09-25 ASSESSMENT — PAIN SCALES - GENERAL
PAINLEVEL_OUTOF10: 0 - NO PAIN

## 2024-09-25 NOTE — ANESTHESIA PREPROCEDURE EVALUATION
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 : N  Date of Last Liquid: 09/24/24  Time of Last Liquid: 2300  Date of Last Solid: 09/25/24  Time of Last Solid: 2300  Last Intake Type: Food  Time of Last Void: 0600         Physical Exam    Airway  Mallampati: I  TM distance: >3 FB  Neck ROM: full     Cardiovascular - normal exam  Rhythm: regular  Rate: normal     Dental - normal exam     Pulmonary - normal exam  Breath sounds clear to auscultation  (+) decreased breath sounds     Abdominal   (+) obese  Abdomen: soft  Bowel sounds: normal           Anesthesia Plan    History of general anesthesia?: yes  History of complications of general anesthesia?: no    ASA 2     general     The patient is not a current smoker.  Patient was previously instructed to abstain from smoking on day of procedure.  Patient did not smoke on day of  procedure.  Education provided regarding risk of obstructive sleep apnea.  intravenous induction   Postoperative administration of opioids is intended.  Anesthetic plan and risks discussed with patient.  Use of blood products discussed with patient who consented to blood products.    Plan discussed with [facility].

## 2024-09-25 NOTE — ANESTHESIA PROCEDURE NOTES
Peripheral Block    Patient location during procedure: pre-op  Start time: 9/25/2024 8:06 AM  End time: 9/25/2024 8:09 AM  Reason for block: at surgeon's request  Staffing  Performed: attending   Authorized by: Fallon Gayle MD    Performed by: Fallon Gayle MD  Preanesthetic Checklist  Completed: patient identified, IV checked, site marked, risks and benefits discussed, surgical consent, monitors and equipment checked, pre-op evaluation and timeout performed   Timeout performed at: 9/25/2024 8:02 AM  Peripheral Block  Patient position: sitting  Prep: alcohol swabs  Patient monitoring: heart rate and continuous pulse ox  Block type: other  Laterality: left  Injection technique: single-shot  Local infiltration: lidocaine  Infiltration strength: 4 %  Dose: 7 mL  Needle  Needle type: pencil-tip   Needle gauge: 26 G  Needle length: 5 cm  Needle insertion depth: 0.5 cm  Test dose: negative  Assessment  Injection assessment: negative aspiration for heme, incremental injection and no paresthesia on injection  Paresthesia pain: none  Heart rate change: no  Slow fractionated injection: yes  Additional Notes  Digital Block for left middle trigger finger release. 7ml of 4% Lidocaine in 1% Bicarbonate with Epinephrine 1:100 000 injected slowly, incrementally with frequent negative aspirations for heme throughout injection. Patient tolerated procedure with minimal discomfort. No immediate procedure related complications were observed. Digital Block. 7 ml of 4% Lidocaine in 1% Bicarbonate with Epinephrine injected slowly, incrementally with frequent negative injections for heme throughout injection. Patient tolerated procedure with minimal discomfort. No immediate procedure related complications were observed.

## 2024-09-25 NOTE — TELEPHONE ENCOUNTER
Colin the pharmacist at [facility] called for verification on the paper RX they received for Ultram.  She wanted to make sure that  is aware patient is on chronic percocet for severe pain, the same thing the Ultram was ordered for.  She will not fill this until she hears back from our office.  She can be reached at [phone].

## 2024-09-25 NOTE — TELEPHONE ENCOUNTER
Called pharmacy back, informed them that patient had surgery today, that is why she has the paper script for the Ultram, the pharmacist stated she does not feel comfortable filling this as she is on a chronic script for Percocet, I told her then if she is on this don't fill the Ultram.

## 2024-09-25 NOTE — OP NOTE
LEFT MIDDLE FINGER TRIGGER FINGER RELEASE WITH FLEXOR RETINACULAR SHEATH CYST EXCISION (L) Operative Note     Date: 2024  OR Location: [facility]    Name: Bridget Hannon, : 1960, Age: 63 y.o., MRN: [id number], Sex: female    Preoperative diagnosis: Left long finger trigger finger.  Left long finger mass.    Postoperative diagnosis: Same    Procedure planned: Left long finger trigger finger release.  Left long finger excision mass.    Procedure performed: Left long finger trigger finger release. Excision 1 cm subfascial mass adherent to A1 j luis.    Surgeon: Luca Head D.O.    Asst.: STEVENSON Banerjee  The physician assistant was present to the entire case. Given the nature of the disease process and the procedure to be performed a skilled surgical assistant was necessary during the case. The assistant was necessary in order to hold retractors and directly assist in the operation. A certified scrub tech was at the back table managing instruments and supplies for the surgical case.    Anesthesia type: Local field block performed with lidocaine with epinephrine solution buffered with bicarbonate monitored by the anesthesia team    Estimated blood loss: Less than 10 mL    Drains: None    Tourniquet: None    Specimens: Excised mass was sent for pathology.    Implants: None    Indications for surgery: The patient presented with painful triggering of the left long finger.  There was also evidence for palpable mass at level of A1 pulley that was bothersome.  The patient had failure of nonoperative treatment strategies. After full discussion regarding risks benefits and alternatives the patient elected to forego any additional nonoperative management in favor of left long finger trigger finger release with excision mass.    Complications: None noted at time of surgery    Description of procedure: The patient was taken to the operative suite and placed in the supine position on the operating table.  A  timeout was performed and the left long finger was confirmed to be the operative site.  The patient was carefully positioned on the table in such a fashion as to pad all bony prominences and peripheral nerves.  The patient was administered appropriate preoperative IV antibiotics.  The patient was then prepped and draped in the normal sterile fashion.  An oblique incision was then marked out directly overlying the A1 pulley to the left long finger.  The 15 blade was used to incise skin.  The tenotomy scissors were used to gently dissect down through the subcutaneous plane taking care to protect the neurovascular bundles both radially and ulnarly.  The proximal and distal boundaries of the A1 pulley were dissected out and directly visualized.  There was a spherical mass arising from the A1 pulley adhered to the pulley itself.  The mass was circumferentially dissected and removed with a portion of the radial leaf of the A1 pulley.  It measured 1 cm in maximal dimension.  It was sent for pathology.  The tenotomy scissors were then used to release the remaining portion of the A1 pulley within its midline.  The hypertrophic palmar aponeurotic pulley was also released within its midline under direct visualization. Ragnell retractors were then used to independently traction FDS and FDP in order to perform traction tenolysis.  The patient was then asked to perform digital range of motion.  The patient was noted to have full range of motion with nice smooth gliding and no persistent triggering.  Copious irrigation was then performed.  Interrupted nylon stitches were used to close the skin.  A bulky soft dressing was placed.  The patient was then allowed to head to recovery in stable condition.  Overall the patient tolerated the procedure well.    Disposition: To PACU in stable condition    Luca Head  Phone Number: [phone]

## 2024-09-25 NOTE — ANESTHESIA POSTPROCEDURE EVALUATION
Patient: Bridget Hannon    Procedure Summary       Date: 09/25/24 Room / Location: [facility] 05 / [facility]    Anesthesia Start: 0817 Anesthesia Stop: 0852    Procedure: LEFT MIDDLE FINGER TRIGGER FINGER RELEASE WITH FLEXOR RETINACULAR SHEATH CYST EXCISION (Left: Middle Finger) Diagnosis:       Other specified disorders of tendon, left hand      Trigger finger, left middle finger      (Other specified disorders of tendon, left hand [M67.844])      (Trigger finger, left middle finger [M65.332])    Surgeons: Luca Head DO Responsible Provider: Fallon Gayle MD    Anesthesia Type: regional ASA Status: 2            Anesthesia Type: regional    Vitals Value Taken Time   /70 09/25/24 0853   Temp 36.4 09/25/24 0853   Pulse 69 09/25/24 0853   Resp 14 09/25/24 0853   SpO2 92 09/25/24 0853       Anesthesia Post Evaluation    Patient location during evaluation: bedside  Patient participation: complete - patient participated  Level of consciousness: awake  Pain management: adequate  Airway patency: patent  Cardiovascular status: acceptable  Respiratory status: acceptable  Hydration status: acceptable  Postoperative Nausea and Vomiting: none      No notable events documented.

## 2024-10-01 LAB
LABORATORY COMMENT REPORT: NORMAL
PATH REPORT.FINAL DX SPEC: NORMAL
PATH REPORT.GROSS SPEC: NORMAL
PATH REPORT.RELEVANT HX SPEC: NORMAL
PATH REPORT.TOTAL CANCER: NORMAL

## 2024-10-08 ENCOUNTER — OFFICE VISIT (OUTPATIENT)
Dept: ORTHOPEDIC SURGERY | Facility: CLINIC | Age: 64
End: 2024-10-08
Payer: MEDICARE

## 2024-10-08 DIAGNOSIS — M65.30 ACQUIRED TRIGGER FINGER: Primary | ICD-10-CM

## 2024-10-08 PROCEDURE — 4010F ACE/ARB THERAPY RXD/TAKEN: CPT | Performed by: ORTHOPAEDIC SURGERY

## 2024-10-08 PROCEDURE — 3048F LDL-C <100 MG/DL: CPT | Performed by: ORTHOPAEDIC SURGERY

## 2024-10-08 PROCEDURE — 3044F HG A1C LEVEL LT 7.0%: CPT | Performed by: ORTHOPAEDIC SURGERY

## 2024-10-08 PROCEDURE — 99024 POSTOP FOLLOW-UP VISIT: CPT | Performed by: ORTHOPAEDIC SURGERY

## 2024-10-08 PROCEDURE — 99211 OFF/OP EST MAY X REQ PHY/QHP: CPT | Performed by: ORTHOPAEDIC SURGERY

## 2024-10-08 NOTE — PROGRESS NOTES
10/8/2024    Chief Complaint   Patient presents with    Left Middle Finger - Post-op     TFR  DOS: 9/25/24       History of Present Illness:  Patient Bridget Hannon , 63 y.o. female, presents today, 10/8/2024, for evaluation of left middle finger  trigger digit release with mass excision, 2 weeks postop.  Mechanical symptoms to the long finger have resolved.  Minimal soreness discomfort is improving with time.  Denies any fevers, chills, constitutional symptoms.  New complaint today of new onset left index finger triggering in the interim since surgery. .         Review of Systems:   GENERAL: Negative  GI: Negative  MUSCULOSKELETAL: See HPI  SKIN: Negative  NEURO:  Negative     Physical Exam:  GENERAL:  Alert and oriented to person, place, and time.  No acute distress and breathing comfortably; pleasant and cooperative with the examination.  HEENT:  Head is normocephalic and atraumatic.  NECK:  Supple, no visible swelling.  CARDIOVASCULAR:  No palpable tachycardia.  LUNGS:  No audible wheezing or labored breathing.  ABDOMEN:  Nondistended.  Extremities: The surgical incision is clean, dry, intact, and appears to be healing well.  No active bleeding, erythema, warmth, drainage, or signs of infection.  Appropriate functional ROM demonstrated with flexion/extension of the digits, and flexion/extension/pronosupination of the wrist.     Imaging/Test Results:  Surgical pathology results show findings consistent with ganglion cyst.     Assessment:  Left long finger trigger digit release and mass excision, 2 weeks postop.     Plan:  Sutures were removed in the office today.  The patient can begin to weight bear as tolerated.  We discussed and reviewed home exercise program for range of motion recovery, scar massage, and desensitization techniques.  They can return to activities as tolerated.  The patient will follow-up with our office on Monday for trigger finger injection to left index finger trigger digit.  Due  to her insurance were unable to perform this in the office today without prior authorization.  All patient questions answered at today's visit.    Sailaja Hernandez PA-C

## 2024-10-14 ENCOUNTER — APPOINTMENT (OUTPATIENT)
Dept: ORTHOPEDIC SURGERY | Facility: CLINIC | Age: 64
End: 2024-10-14
Payer: MEDICARE

## 2024-10-14 DIAGNOSIS — G89.29 OTHER CHRONIC PAIN: ICD-10-CM

## 2024-10-15 ENCOUNTER — APPOINTMENT (OUTPATIENT)
Dept: RHEUMATOLOGY | Facility: CLINIC | Age: 64
End: 2024-10-15
Payer: MEDICARE

## 2024-10-15 RX ORDER — OXYCODONE AND ACETAMINOPHEN 5; 325 MG/1; MG/1
1 TABLET ORAL EVERY 4 HOURS PRN
Qty: 180 TABLET | Refills: 0 | Status: SHIPPED | OUTPATIENT
Start: 2024-10-16 | End: 2024-11-15

## 2024-10-18 DIAGNOSIS — Z12.31 ENCOUNTER FOR SCREENING MAMMOGRAM FOR BREAST CANCER: ICD-10-CM

## 2024-11-02 DIAGNOSIS — G62.9 NEUROPATHY: ICD-10-CM

## 2024-11-04 RX ORDER — GABAPENTIN 800 MG/1
TABLET ORAL
Qty: 300 TABLET | Refills: 2 | Status: SHIPPED | OUTPATIENT
Start: 2024-11-04

## 2024-11-11 ENCOUNTER — APPOINTMENT (OUTPATIENT)
Dept: ORTHOPEDIC SURGERY | Facility: CLINIC | Age: 64
End: 2024-11-11
Payer: MEDICARE

## 2024-11-11 DIAGNOSIS — G89.29 OTHER CHRONIC PAIN: ICD-10-CM

## 2024-11-11 DIAGNOSIS — M65.30 ACQUIRED TRIGGER FINGER: Primary | ICD-10-CM

## 2024-11-11 PROCEDURE — 3048F LDL-C <100 MG/DL: CPT | Performed by: ORTHOPAEDIC SURGERY

## 2024-11-11 PROCEDURE — 4010F ACE/ARB THERAPY RXD/TAKEN: CPT | Performed by: ORTHOPAEDIC SURGERY

## 2024-11-11 PROCEDURE — 99214 OFFICE O/P EST MOD 30 MIN: CPT | Performed by: ORTHOPAEDIC SURGERY

## 2024-11-11 PROCEDURE — 3044F HG A1C LEVEL LT 7.0%: CPT | Performed by: ORTHOPAEDIC SURGERY

## 2024-11-11 PROCEDURE — 20550 NJX 1 TENDON SHEATH/LIGAMENT: CPT | Performed by: ORTHOPAEDIC SURGERY

## 2024-11-11 RX ORDER — LIDOCAINE HYDROCHLORIDE 10 MG/ML
0.5 INJECTION, SOLUTION INFILTRATION; PERINEURAL
Status: COMPLETED | OUTPATIENT
Start: 2024-11-11 | End: 2024-11-11

## 2024-11-11 RX ORDER — OXYCODONE AND ACETAMINOPHEN 5; 325 MG/1; MG/1
1 TABLET ORAL EVERY 4 HOURS PRN
Qty: 180 TABLET | Refills: 0 | Status: SHIPPED | OUTPATIENT
Start: 2024-11-15 | End: 2024-12-15

## 2024-11-11 NOTE — PROGRESS NOTES
History present illness: Patient presents today for evaluation of painful triggering to the left index finger.  She states that the left long finger, which underwent trigger finger release, is improved in general but there is a persistent swelling through the course of the flexor tendon in zone of surgical dissection.        Physical examination:  General: Alert and oriented to person, place, and time.  No acute distress and breathing comfortably: Pleasant and cooperative with examination.  Extremities: Evaluation of the left upper extremity finds the patient had palpable radial artery at the wrist with brisk capillary refill to all digits.  Patient has intact sensation to axillary radial median and ulnar nerves.  There are no open wounds.  There are no signs of infection.  There is no evidence of lymphedema or lymphatic streaking.  The patient has supple compartments to left arm forearm and hand.  Focal tenderness over A1 pulley to left index with obvious mechanical triggering.  General thickening with evidence for synovitic fluid about the flexor tendon and tenderness through zone of surgical release to left long finger.      Diagnostic studies:      Assessment: Left index finger trigger finger.  Left long finger status post trigger finger release with synovitic change about the tendon.      Plan: Treatment options were discussed.  The long finger shows no persistence of triggering but clearly there is a abundant accumulation of the synovitic fluid through the zone of surgical release.  There are no signs of infection.  Treatment options were discussed.  Patient elects for left index finger trigger finger injection and for left long finger injection as would be done for trigger finger to help the synovitic fluid collection subside.  Plan for follow-up with me in the office in 4 weeks.  No x-rays upon return.      Procedure:        Procedure:  Hand / UE Inj/Asp: L index A1 for trigger finger on 11/11/2024 2:37  PM  Indications: pain and tendon swelling  Details: 25 G needle, volar approach  Medications: 5 mg triamcinolone acetonide 10 mg/mL; 0.5 mL lidocaine 10 mg/mL (1 %)  Outcome: tolerated well, no immediate complications  Procedure, treatment alternatives, risks and benefits explained, specific risks discussed. Consent was given by the patient. Immediately prior to procedure a time out was called to verify the correct patient, procedure, equipment, support staff and site/side marked as required. Patient was prepped and draped in the usual sterile fashion.       Hand / UE Inj/Asp: L long A1 for trigger finger on 11/11/2024 2:37 PM  Indications: pain and tendon swelling  Details: 25 G needle, volar approach  Medications: 5 mg triamcinolone acetonide 10 mg/mL; 0.5 mL lidocaine 10 mg/mL (1 %)  Outcome: tolerated well, no immediate complications  Procedure, treatment alternatives, risks and benefits explained, specific risks discussed. Consent was given by the patient. Immediately prior to procedure a time out was called to verify the correct patient, procedure, equipment, support staff and site/side marked as required. Patient was prepped and draped in the usual sterile fashion.

## 2024-11-20 ENCOUNTER — APPOINTMENT (OUTPATIENT)
Dept: NEUROLOGY | Facility: CLINIC | Age: 64
End: 2024-11-20
Payer: MEDICARE

## 2024-12-03 ENCOUNTER — APPOINTMENT (OUTPATIENT)
Dept: RHEUMATOLOGY | Facility: CLINIC | Age: 64
End: 2024-12-03
Payer: MEDICARE

## 2024-12-06 ENCOUNTER — APPOINTMENT (OUTPATIENT)
Dept: PRIMARY CARE | Facility: CLINIC | Age: 64
End: 2024-12-06
Payer: MEDICARE

## 2024-12-09 ENCOUNTER — APPOINTMENT (OUTPATIENT)
Dept: ORTHOPEDIC SURGERY | Facility: CLINIC | Age: 64
End: 2024-12-09
Payer: MEDICARE

## 2024-12-10 ENCOUNTER — TELEPHONE (OUTPATIENT)
Dept: PRIMARY CARE | Facility: CLINIC | Age: 64
End: 2024-12-10
Payer: MEDICARE

## 2024-12-10 DIAGNOSIS — R52 PAIN: ICD-10-CM

## 2024-12-11 ENCOUNTER — APPOINTMENT (OUTPATIENT)
Dept: PRIMARY CARE | Facility: CLINIC | Age: 64
End: 2024-12-11
Payer: MEDICARE

## 2024-12-11 VITALS
DIASTOLIC BLOOD PRESSURE: 106 MMHG | BODY MASS INDEX: 27.11 KG/M2 | HEIGHT: 63 IN | SYSTOLIC BLOOD PRESSURE: 190 MMHG | WEIGHT: 153 LBS | OXYGEN SATURATION: 95 % | HEART RATE: 72 BPM

## 2024-12-11 DIAGNOSIS — E55.9 VITAMIN D DEFICIENCY, UNSPECIFIED: ICD-10-CM

## 2024-12-11 DIAGNOSIS — G89.29 CHRONIC BACK PAIN, UNSPECIFIED BACK LOCATION, UNSPECIFIED BACK PAIN LATERALITY: ICD-10-CM

## 2024-12-11 DIAGNOSIS — Z98.84 BARIATRIC SURGERY STATUS: ICD-10-CM

## 2024-12-11 DIAGNOSIS — I10 BENIGN ESSENTIAL HYPERTENSION: ICD-10-CM

## 2024-12-11 DIAGNOSIS — E55.9 VITAMIN D DEFICIENCY: ICD-10-CM

## 2024-12-11 DIAGNOSIS — E78.5 HYPERLIPIDEMIA, UNSPECIFIED HYPERLIPIDEMIA TYPE: ICD-10-CM

## 2024-12-11 DIAGNOSIS — Z79.899 DRUG THERAPY: ICD-10-CM

## 2024-12-11 DIAGNOSIS — M54.9 CHRONIC BACK PAIN, UNSPECIFIED BACK LOCATION, UNSPECIFIED BACK PAIN LATERALITY: ICD-10-CM

## 2024-12-11 DIAGNOSIS — Z13.9 SCREENING FOR CONDITION: ICD-10-CM

## 2024-12-11 DIAGNOSIS — G89.4 CHRONIC PAIN SYNDROME: ICD-10-CM

## 2024-12-11 DIAGNOSIS — R00.2 PALPITATION: Primary | ICD-10-CM

## 2024-12-11 DIAGNOSIS — F32.A DEPRESSION, UNSPECIFIED DEPRESSION TYPE: ICD-10-CM

## 2024-12-11 DIAGNOSIS — R94.31 ABNORMAL EKG: ICD-10-CM

## 2024-12-11 DIAGNOSIS — E11.9 TYPE 2 DIABETES MELLITUS WITHOUT COMPLICATION, WITHOUT LONG-TERM CURRENT USE OF INSULIN (MULTI): ICD-10-CM

## 2024-12-11 DIAGNOSIS — G25.81 RLS (RESTLESS LEGS SYNDROME): ICD-10-CM

## 2024-12-11 DIAGNOSIS — G89.29 OTHER CHRONIC PAIN: ICD-10-CM

## 2024-12-11 PROCEDURE — 4010F ACE/ARB THERAPY RXD/TAKEN: CPT | Performed by: FAMILY MEDICINE

## 2024-12-11 PROCEDURE — 3080F DIAST BP >= 90 MM HG: CPT | Performed by: FAMILY MEDICINE

## 2024-12-11 PROCEDURE — G2211 COMPLEX E/M VISIT ADD ON: HCPCS | Performed by: FAMILY MEDICINE

## 2024-12-11 PROCEDURE — 3044F HG A1C LEVEL LT 7.0%: CPT | Performed by: FAMILY MEDICINE

## 2024-12-11 PROCEDURE — 3077F SYST BP >= 140 MM HG: CPT | Performed by: FAMILY MEDICINE

## 2024-12-11 PROCEDURE — 3008F BODY MASS INDEX DOCD: CPT | Performed by: FAMILY MEDICINE

## 2024-12-11 PROCEDURE — 3048F LDL-C <100 MG/DL: CPT | Performed by: FAMILY MEDICINE

## 2024-12-11 PROCEDURE — 99214 OFFICE O/P EST MOD 30 MIN: CPT | Performed by: FAMILY MEDICINE

## 2024-12-11 RX ORDER — LIDOCAINE 50 MG/G
PATCH TOPICAL
Qty: 30 PATCH | Refills: 1 | Status: SHIPPED | OUTPATIENT
Start: 2024-12-11

## 2024-12-11 RX ORDER — OXYCODONE AND ACETAMINOPHEN 5; 325 MG/1; MG/1
1 TABLET ORAL EVERY 4 HOURS PRN
Qty: 180 TABLET | Refills: 0 | Status: SHIPPED | OUTPATIENT
Start: 2024-12-15 | End: 2025-01-14

## 2024-12-11 NOTE — PROGRESS NOTES
Subjective   Patient ID: Bridget Hannon is a 63 y.o. female who presents for 90 Day CS Refill FU (Pt in today for routine controlled substance medication FU ).    Review of Systems   Respiratory:  Choking: bilateral leg pain.      Denies N/V/D/C, no HA/S/V, denies rashes/lesions, no CP/SOB. Denies fevers/chills.  All other systems were negative.  Positive for chronic pain multiple locations.    Objective   Physical Exam  Gen: NAD  eyes: EOMI, PERRLA  ENT: hearing grossly intact, no nasal discharge  resp: CTABL, without R/R  heart: RRR without MRG  GI: abd: S/ND/NT, BS+  lymph: no axillary, cervical, supraclavicular lymphadenopathy noted   MS: gait grossly WNL,  derm: no rashes or lesions noted  neuro: CN II-XII grossly intact  psych: A&Ox3    Assessment/Plan   Diagnoses and all orders for this visit:  Palpitation  -     Referral to Cardiology; Future  Other chronic pain  -     oxyCODONE-acetaminophen (Percocet) 5-325 mg tablet; Take 1 tablet by mouth every 4 hours if needed for severe pain (7 - 10). Do not fill before December 15, 2024.  Drug therapy  -     CBC and Auto Differential; Future  -     Comprehensive Metabolic Panel; Future  -     Magnesium; Future  -     Urinalysis with Reflex Microscopic; Future  Screening for condition  -     TSH with reflex to Free T4 if abnormal; Future  -     Hemoglobin A1C; Future  Vitamin D deficiency  -     Vitamin D 25-Hydroxy,Total (for eval of Vitamin D levels); Future  Vitamin D deficiency, unspecified  Type 2 diabetes mellitus without complication, without long-term current use of insulin (Multi)  -     Hemoglobin A1C; Future  Depression, unspecified depression type  RLS (restless legs syndrome)  Abnormal EKG  Bariatric surgery status  Benign essential hypertension  Hyperlipidemia, unspecified hyperlipidemia type  -     Lipid Panel; Future  Chronic back pain, unspecified back location, unspecified back pain laterality  Chronic pain syndrome        Patient  Discussion/Summary     March 2025: Doing CSA and UDS and annual lab.  2025 will be next annual preventative visit.  WV today.     -------  Screen for breast cancer:. (Had hysterectomy around 2001). Had normal mammogram March 2022. -->>  Follow-up as planned with your mammogram.     Colon cancer screening: Next cologard due July 2027    Hepatitis C screening was negative January 2022.     Immunization counseling: Tdap February 2022. Due for Shingles immunizations.  UTD on annual flu shot.  Due for latest coronavirus booster, new RSV shot. -->>  Will administer flu shot today.  Check with pharmacy to keep up-to-date on immunizations.     Cardiac CT scoring was 113, over read was negative. -->> We will plan to aggressively manage risk factors including LDL below 70.     -------      H/o Obesity, BMI 26, Down from maximum of 283 February 2020. Is post gastric bypass May 2021. -->>  Keep up the excellent work.       Chronic pain, right arm, low back, left shoulder, hip pain. Also having some R sided sciatica pain recently. R arm post many surgeries and injuries.  Pain worse recently.  Had left arm surgery for cyst.  Doing well.  Is now seeing pain management/Dr. Meza. Pain mgt asked us to continue prescribe Percocet while they work on other modalities.   Is currently on 6 Percocet a day. Failed Voltaren gel (ineffective). On duloxetine, 60mg daily, which seems to be at least a little better than the 30 mg. Finding it at least somewhat helpful.  Seeing rheumatology, Dr. Dominique.  Patient is also getting appointment scheduled with rheumatologist closer.  Topicals from compounding pharmacy not covered.-->> Follow-up up with pain management, orthopedics, rheumatology as planned. CSA/UDS next due march 2025. Could consider trying over-the-counter capsaicin topical to see if that helps her pain.         Regarding previous labs:    -Dehydrated -->> drink more water especially before labs.    - Hyperlipidemia: numbers are  excellent on no meds.-->> Keep up the excellent work. Patient is now post gastric bypass surgery.    - type 2 diabetes: A1c is 4.9, was 4.9, 5.2, 5.1, 6.1, 5.9, 6.3, 6.9, 5.8. Patient does note she has significantly cut back on sugar. No longer on Metformin. Will recheck A1c in 12 months    - Drug therapy -->> Plan to recheck annual labs early 2025.    - Vitamin D deficiency, 39, was 46, 54, 62, 81, 75, 57, 56, 67, 54, 49, 52, 42, 51, 25, 21, goal is . Post gastric bypass. Patient is currently on 100,000 units weekly. -->> Increase to 3 pills a week for total of 150,000 units weekly.  We will recheck in about 6 months with next annual labs.  regarding previous labs:       Depression, in general does well with sertraline plus duloxetine. -->> Will refill as needed.  Recommend finding videos on meditation or visualizations and spend at least 5 to 15 minutes every day.     RLS, doing well 1 mg ropinirole TID. -->> We will refill as needed.     Palpitations.  Patient previously saw cardiology, sounds like previous testing was grossly within normal limits, however palpitations are bothering her and would like to see cardiology again for evaluation and treatment.      Patient did quit smoking January 5, 2019. -->> Keep up the most excellent work.      - patient will return in about 3 months for UDS/CSA, Medicare wellness visit, annual preventative visit, annual lab review.    -Patient will return here about a week before the appointment to get fasting annual labs drawn.

## 2024-12-11 NOTE — PATIENT INSTRUCTIONS
March 2025: Doing CSA and UDS and annual lab.  2025 will be next annual preventative visit.  [facility] today.     -------  Screen for breast cancer:. (Had hysterectomy around 2001). Had normal mammogram March 2022. -->>  Follow-up as planned with your mammogram.     Colon cancer screening: Next cologard due July 2027    Hepatitis C screening was negative January 2022.     Immunization counseling: Tdap February 2022. Due for Shingles immunizations.  UTD on annual flu shot.  Due for latest coronavirus booster, new RSV shot. -->>  Will administer flu shot today.  Check with pharmacy to keep up-to-date on immunizations.     Cardiac CT scoring was 113, over read was negative. -->> We will plan to aggressively manage risk factors including LDL below 70.     -------      H/o Obesity, BMI 26, Down from maximum of 283 February 2020. Is post gastric bypass May 2021. -->>  Keep up the excellent work.       Chronic pain, right arm, low back, left shoulder, hip pain. Also having some R sided sciatica pain recently. R arm post many surgeries and injuries.  Pain worse recently.  Had left arm surgery for cyst.  Doing well.  Is now seeing pain management/Dr. Meza. Pain mgt asked us to continue prescribe Percocet while they work on other modalities.   Is currently on 6 Percocet a day. Failed Voltaren gel (ineffective). On duloxetine, 60mg daily, which seems to be at least a little better than the 30 mg. Finding it at least somewhat helpful.  Seeing rheumatology, Dr. Dominique.  Patient is also getting appointment scheduled with rheumatologist closer.  Topicals from compounding pharmacy not covered.-->> Follow-up up with pain management, orthopedics, rheumatology as planned. CSA/UDS next due march 2025. Could consider trying over-the-counter capsaicin topical to see if that helps her pain.         Regarding previous labs:    -Dehydrated -->> drink more water especially before labs.    - Hyperlipidemia: numbers are excellent on no meds.-->>  Keep up the excellent work. Patient is now post gastric bypass surgery.    - type 2 diabetes: A1c is 4.9, was 4.9, 5.2, 5.1, 6.1, 5.9, 6.3, 6.9, 5.8. Patient does note she has significantly cut back on sugar. No longer on Metformin. Will recheck A1c in 12 months    - Drug therapy -->> Plan to recheck annual labs early 2025.    - Vitamin D deficiency, 39, was 46, 54, 62, 81, 75, 57, 56, 67, 54, 49, 52, 42, 51, 25, 21, goal is . Post gastric bypass. Patient is currently on 100,000 units weekly. -->> Increase to 3 pills a week for total of 150,000 units weekly.  We will recheck in about 6 months with next annual labs.  regarding previous labs:       Depression, in general does well with sertraline plus duloxetine. -->> Will refill as needed.  Recommend finding videos on meditation or visualizations and spend at least 5 to 15 minutes every day.     RLS, doing well 1 mg ropinirole TID. -->> We will refill as needed.     Palpitations.  Patient previously saw cardiology, sounds like previous testing was grossly within normal limits, however palpitations are bothering her and would like to see cardiology again for evaluation and treatment.      Patient did quit smoking January 5, 2019. -->> Keep up the most excellent work.      - patient will return in about 3 months for UDS/CSA, Medicare wellness visit, annual preventative visit, annual lab review.    -Patient will return here about a week before the appointment to get fasting annual labs drawn.

## 2025-01-10 DIAGNOSIS — G89.29 OTHER CHRONIC PAIN: ICD-10-CM

## 2025-01-13 RX ORDER — OXYCODONE AND ACETAMINOPHEN 5; 325 MG/1; MG/1
1 TABLET ORAL EVERY 4 HOURS PRN
Qty: 180 TABLET | Refills: 0 | Status: SHIPPED | OUTPATIENT
Start: 2025-01-14 | End: 2025-02-13

## 2025-01-16 ENCOUNTER — TELEPHONE (OUTPATIENT)
Dept: CARDIOLOGY | Facility: CLINIC | Age: 65
End: 2025-01-16

## 2025-01-16 ENCOUNTER — APPOINTMENT (OUTPATIENT)
Dept: CARDIOLOGY | Facility: CLINIC | Age: 65
End: 2025-01-16
Payer: MEDICARE

## 2025-02-10 DIAGNOSIS — G25.81 RLS (RESTLESS LEGS SYNDROME): ICD-10-CM

## 2025-02-10 DIAGNOSIS — G89.29 OTHER CHRONIC PAIN: ICD-10-CM

## 2025-02-10 RX ORDER — OXYCODONE AND ACETAMINOPHEN 5; 325 MG/1; MG/1
1 TABLET ORAL EVERY 4 HOURS PRN
Qty: 180 TABLET | Refills: 0 | Status: SHIPPED | OUTPATIENT
Start: 2025-02-13 | End: 2025-03-15

## 2025-02-20 ENCOUNTER — APPOINTMENT (OUTPATIENT)
Dept: CARDIOLOGY | Facility: CLINIC | Age: 65
End: 2025-02-20
Payer: MEDICARE

## 2025-03-02 DIAGNOSIS — E55.9 VITAMIN D DEFICIENCY, UNSPECIFIED: ICD-10-CM

## 2025-03-03 RX ORDER — ERGOCALCIFEROL 1.25 MG/1
CAPSULE ORAL
Qty: 12 CAPSULE | Refills: 5 | Status: SHIPPED | OUTPATIENT
Start: 2025-03-03

## 2025-03-04 ENCOUNTER — APPOINTMENT (OUTPATIENT)
Dept: RHEUMATOLOGY | Facility: CLINIC | Age: 65
End: 2025-03-04
Payer: MEDICARE

## 2025-03-04 ASSESSMENT — RHEUMATOLOGY NEW PATIENT QUESTIONNAIRE
UNUSUAL BLEEDING: N
AGITATION: N
DOUBLE OR BLURRED VISION: N
HEADACHES: N
DIFFICULTY FALLING ASLEEP: N
MORNING STIFFNESS: Y
JOINT SWELLING: Y
DIFFICULTY STAYING ASLEEP: Y
FAINTING: N
HEARTBURN OR REFLUX: N
BLACK STOOLS: N
LIST JOINTS AFFECTED BY SWELLING IN THE PAST MONTH: HANDS
DIFFICULTY BREATHING LYING DOWN: N
VOMITING OF BLOOD OR COFFEE GROUND CONSISTENCY MATERIAL: N
UNUSUALLY RAPID OR SLOWED HEART RATE: N
COLOR CHANGES OF HANDS OR FEET IN THE COLD: N
STOMACH PAIN: N
SUN SENSITIVE (SUN ALLERGY): N
BLOOD IN STOOLS: N
BEHAVIORAL CHANGES: N
CHEST PAIN: N
COUGH: N
INCREASED SUSCEPTIBILITY TO INFECTION: N
HOARSE VOICE: N
ABNORMAL URINE: N
JOINT PAIN: Y
EYE REDNESS: N
FEVER: N
PAIN OR BURNING ON URINATION: N
EYE DRYNESS: N
UNEXPLAINED HEARING LOSS: N
SORES IN MOUTH OR NOSE: N
ANXIETY: N
NIGHT SWEATS: N
MUSCLE WEAKNESS: Y
MORNING STIFFNESS IN LOWER BACK: Y
PERSISTENT DIARRHEA: N
SKIN TIGHTNESS: N
LOSS OF CONSCIOUSNESS: N
SWOLLEN OR TENDER GLANDS: N
DRYNESS OF MOUTH: N
HOW WOULD YOU DESCRIBE YOUR STIFFNESS ON AVERAGE: MILD
RASH OR ULCERS: N
DEPRESSION: Y
SEIZURES: N
EYE PAIN: N
UNEXPLAINED WEIGHT CHANGE: N
ANEMIA: N
VAGINAL DRYNESS: N
SWOLLEN LEGS OR FEET: N
RASH: N
EASILY LOSING TEMPER: N
NODULES/BUMPS: N
NAUSEA: N
SKIN REDNESS: N
UNUSUAL FATIGUE: N
JAUNDICE: N
DIFFICULTY SWALLOWING: N
NUMBNESS OR TINGLING IN HANDS OR FEET: Y
EXCESSIVE HAIR LOSS (MORE THAN YOUR NORM): N
EASY BRUISING: N
LOSS OF VISION: N
SHORTNESS OF BREATH: N
MEMORY LOSS: N

## 2025-03-05 ENCOUNTER — APPOINTMENT (OUTPATIENT)
Dept: PRIMARY CARE | Facility: CLINIC | Age: 65
End: 2025-03-05
Payer: MEDICARE

## 2025-03-05 DIAGNOSIS — Z13.9 SCREENING FOR CONDITION: ICD-10-CM

## 2025-03-05 DIAGNOSIS — E78.5 HYPERLIPIDEMIA, UNSPECIFIED HYPERLIPIDEMIA TYPE: ICD-10-CM

## 2025-03-05 DIAGNOSIS — M11.20 CHONDROCALCINOSIS: ICD-10-CM

## 2025-03-05 DIAGNOSIS — E55.9 VITAMIN D DEFICIENCY: ICD-10-CM

## 2025-03-05 DIAGNOSIS — Z79.899 DRUG THERAPY: ICD-10-CM

## 2025-03-05 DIAGNOSIS — E11.9 TYPE 2 DIABETES MELLITUS WITHOUT COMPLICATION, WITHOUT LONG-TERM CURRENT USE OF INSULIN (MULTI): ICD-10-CM

## 2025-03-05 DIAGNOSIS — E83.119 HEMOCHROMATOSIS, UNSPECIFIED HEMOCHROMATOSIS TYPE: ICD-10-CM

## 2025-03-05 NOTE — PROGRESS NOTES
Subjective   Patient ID: Bridget Hannon is a 64 y.o. female who presents for Labs Only (Pt in today for lab draw).    HPI     Review of Systems    Objective   There were no vitals taken for this visit.    Physical Exam    Assessment/Plan

## 2025-03-06 ENCOUNTER — APPOINTMENT (OUTPATIENT)
Dept: CARDIOLOGY | Facility: CLINIC | Age: 65
End: 2025-03-06
Payer: MEDICARE

## 2025-03-06 DIAGNOSIS — I10 HYPERTENSION, UNSPECIFIED TYPE: ICD-10-CM

## 2025-03-07 LAB
25(OH)D3+25(OH)D2 SERPL-MCNC: 46 NG/ML (ref 30–100)
ALBUMIN SERPL-MCNC: 4.4 G/DL (ref 3.6–5.1)
ALP SERPL-CCNC: 122 U/L (ref 37–153)
ALT SERPL-CCNC: 16 U/L (ref 6–29)
ANION GAP SERPL CALCULATED.4IONS-SCNC: 12 MMOL/L (CALC) (ref 7–17)
APPEARANCE UR: CLEAR
AST SERPL-CCNC: 18 U/L (ref 10–35)
BASOPHILS # BLD AUTO: 28 CELLS/UL (ref 0–200)
BASOPHILS NFR BLD AUTO: 0.4 %
BILIRUB SERPL-MCNC: 0.6 MG/DL (ref 0.2–1.2)
BILIRUB UR QL STRIP: NEGATIVE
BUN SERPL-MCNC: 12 MG/DL (ref 7–25)
CALCIUM SERPL-MCNC: 9 MG/DL (ref 8.6–10.4)
CHLORIDE SERPL-SCNC: 103 MMOL/L (ref 98–110)
CHOLEST SERPL-MCNC: 157 MG/DL
CHOLEST/HDLC SERPL: 2.3 (CALC)
CO2 SERPL-SCNC: 24 MMOL/L (ref 20–32)
COLOR UR: YELLOW
CREAT SERPL-MCNC: 0.47 MG/DL (ref 0.5–1.05)
EGFRCR SERPLBLD CKD-EPI 2021: 108 ML/MIN/1.73M2
EOSINOPHIL # BLD AUTO: 78 CELLS/UL (ref 15–500)
EOSINOPHIL NFR BLD AUTO: 1.1 %
ERYTHROCYTE [DISTWIDTH] IN BLOOD BY AUTOMATED COUNT: 11.6 % (ref 11–15)
EST. AVERAGE GLUCOSE BLD GHB EST-MCNC: 103 MG/DL
EST. AVERAGE GLUCOSE BLD GHB EST-SCNC: 5.7 MMOL/L
FERRITIN SERPL-MCNC: 34 NG/ML (ref 16–288)
GLUCOSE SERPL-MCNC: 87 MG/DL (ref 65–99)
GLUCOSE UR QL STRIP: NEGATIVE
HBA1C MFR BLD: 5.2 % OF TOTAL HGB
HCT VFR BLD AUTO: 43.8 % (ref 35–45)
HDLC SERPL-MCNC: 69 MG/DL
HGB BLD-MCNC: 14.7 G/DL (ref 11.7–15.5)
HGB UR QL STRIP: NEGATIVE
IRON SATN MFR SERPL: 35 % (CALC) (ref 16–45)
IRON SERPL-MCNC: 135 MCG/DL (ref 45–160)
KETONES UR QL STRIP: NEGATIVE
LDLC SERPL CALC-MCNC: 68 MG/DL (CALC)
LEUKOCYTE ESTERASE UR QL STRIP: NEGATIVE
LYMPHOCYTES # BLD AUTO: 2790 CELLS/UL (ref 850–3900)
LYMPHOCYTES NFR BLD AUTO: 39.3 %
MAGNESIUM SERPL-MCNC: 2.2 MG/DL (ref 1.5–2.5)
MCH RBC QN AUTO: 31.6 PG (ref 27–33)
MCHC RBC AUTO-ENTMCNC: 33.6 G/DL (ref 32–36)
MCV RBC AUTO: 94.2 FL (ref 80–100)
MONOCYTES # BLD AUTO: 589 CELLS/UL (ref 200–950)
MONOCYTES NFR BLD AUTO: 8.3 %
NEUTROPHILS # BLD AUTO: 3614 CELLS/UL (ref 1500–7800)
NEUTROPHILS NFR BLD AUTO: 50.9 %
NITRITE UR QL STRIP: NEGATIVE
NONHDLC SERPL-MCNC: 88 MG/DL (CALC)
PH UR STRIP: 6.5 [PH] (ref 5–8)
PHOSPHATE SERPL-MCNC: 4.8 MG/DL (ref 2.5–4.5)
PLATELET # BLD AUTO: 253 THOUSAND/UL (ref 140–400)
PMV BLD REES-ECKER: 9.3 FL (ref 7.5–12.5)
POTASSIUM SERPL-SCNC: 4.2 MMOL/L (ref 3.5–5.3)
PROT SERPL-MCNC: 6.7 G/DL (ref 6.1–8.1)
PROT UR QL STRIP: NEGATIVE
PTH-INTACT SERPL-MCNC: 45 PG/ML (ref 16–77)
RBC # BLD AUTO: 4.65 MILLION/UL (ref 3.8–5.1)
SODIUM SERPL-SCNC: 139 MMOL/L (ref 135–146)
SP GR UR STRIP: 1 (ref 1–1.03)
TIBC SERPL-MCNC: 384 MCG/DL (CALC) (ref 250–450)
TRANSFERRIN SERPL-MCNC: NORMAL MG/DL
TRIGL SERPL-MCNC: 117 MG/DL
TSH SERPL-ACNC: 1.63 MIU/L (ref 0.4–4.5)
WBC # BLD AUTO: 7.1 THOUSAND/UL (ref 3.8–10.8)

## 2025-03-07 RX ORDER — LOSARTAN POTASSIUM 50 MG/1
TABLET ORAL
Qty: 100 TABLET | Refills: 2 | Status: SHIPPED | OUTPATIENT
Start: 2025-03-07

## 2025-03-08 ENCOUNTER — APPOINTMENT (OUTPATIENT)
Dept: RADIOLOGY | Facility: HOSPITAL | Age: 65
End: 2025-03-08
Payer: MEDICARE

## 2025-03-11 LAB
25(OH)D3+25(OH)D2 SERPL-MCNC: 46 NG/ML (ref 30–100)
ALBUMIN SERPL-MCNC: 4.4 G/DL (ref 3.6–5.1)
ALP SERPL-CCNC: 122 U/L (ref 37–153)
ALT SERPL-CCNC: 16 U/L (ref 6–29)
ANION GAP SERPL CALCULATED.4IONS-SCNC: 12 MMOL/L (CALC) (ref 7–17)
APPEARANCE UR: CLEAR
AST SERPL-CCNC: 18 U/L (ref 10–35)
BASOPHILS # BLD AUTO: 28 CELLS/UL (ref 0–200)
BASOPHILS NFR BLD AUTO: 0.4 %
BILIRUB SERPL-MCNC: 0.6 MG/DL (ref 0.2–1.2)
BILIRUB UR QL STRIP: NEGATIVE
BUN SERPL-MCNC: 12 MG/DL (ref 7–25)
CALCIUM SERPL-MCNC: 9 MG/DL (ref 8.6–10.4)
CHLORIDE SERPL-SCNC: 103 MMOL/L (ref 98–110)
CHOLEST SERPL-MCNC: 157 MG/DL
CHOLEST/HDLC SERPL: 2.3 (CALC)
CO2 SERPL-SCNC: 24 MMOL/L (ref 20–32)
COLOR UR: YELLOW
CREAT SERPL-MCNC: 0.47 MG/DL (ref 0.5–1.05)
EGFRCR SERPLBLD CKD-EPI 2021: 108 ML/MIN/1.73M2
EOSINOPHIL # BLD AUTO: 78 CELLS/UL (ref 15–500)
EOSINOPHIL NFR BLD AUTO: 1.1 %
ERYTHROCYTE [DISTWIDTH] IN BLOOD BY AUTOMATED COUNT: 11.6 % (ref 11–15)
EST. AVERAGE GLUCOSE BLD GHB EST-MCNC: 103 MG/DL
EST. AVERAGE GLUCOSE BLD GHB EST-SCNC: 5.7 MMOL/L
FERRITIN SERPL-MCNC: 34 NG/ML (ref 16–288)
GLUCOSE SERPL-MCNC: 87 MG/DL (ref 65–99)
GLUCOSE UR QL STRIP: NEGATIVE
HBA1C MFR BLD: 5.2 % OF TOTAL HGB
HCT VFR BLD AUTO: 43.8 % (ref 35–45)
HDLC SERPL-MCNC: 69 MG/DL
HGB BLD-MCNC: 14.7 G/DL (ref 11.7–15.5)
HGB UR QL STRIP: NEGATIVE
IRON SATN MFR SERPL: 35 % (CALC) (ref 16–45)
IRON SERPL-MCNC: 135 MCG/DL (ref 45–160)
KETONES UR QL STRIP: NEGATIVE
LDLC SERPL CALC-MCNC: 68 MG/DL (CALC)
LEUKOCYTE ESTERASE UR QL STRIP: NEGATIVE
LYMPHOCYTES # BLD AUTO: 2790 CELLS/UL (ref 850–3900)
LYMPHOCYTES NFR BLD AUTO: 39.3 %
MAGNESIUM SERPL-MCNC: 2.2 MG/DL (ref 1.5–2.5)
MCH RBC QN AUTO: 31.6 PG (ref 27–33)
MCHC RBC AUTO-ENTMCNC: 33.6 G/DL (ref 32–36)
MCV RBC AUTO: 94.2 FL (ref 80–100)
MONOCYTES # BLD AUTO: 589 CELLS/UL (ref 200–950)
MONOCYTES NFR BLD AUTO: 8.3 %
NEUTROPHILS # BLD AUTO: 3614 CELLS/UL (ref 1500–7800)
NEUTROPHILS NFR BLD AUTO: 50.9 %
NITRITE UR QL STRIP: NEGATIVE
NONHDLC SERPL-MCNC: 88 MG/DL (CALC)
PH UR STRIP: 6.5 [PH] (ref 5–8)
PHOSPHATE SERPL-MCNC: 4.8 MG/DL (ref 2.5–4.5)
PLATELET # BLD AUTO: 253 THOUSAND/UL (ref 140–400)
PMV BLD REES-ECKER: 9.3 FL (ref 7.5–12.5)
POTASSIUM SERPL-SCNC: 4.2 MMOL/L (ref 3.5–5.3)
PROT SERPL-MCNC: 6.7 G/DL (ref 6.1–8.1)
PROT UR QL STRIP: NEGATIVE
PTH-INTACT SERPL-MCNC: 45 PG/ML (ref 16–77)
RBC # BLD AUTO: 4.65 MILLION/UL (ref 3.8–5.1)
SODIUM SERPL-SCNC: 139 MMOL/L (ref 135–146)
SP GR UR STRIP: 1 (ref 1–1.03)
TIBC SERPL-MCNC: 384 MCG/DL (CALC) (ref 250–450)
TRANSFERRIN SERPL-MCNC: 308 MG/DL (ref 188–341)
TRIGL SERPL-MCNC: 117 MG/DL
TSH SERPL-ACNC: 1.63 MIU/L (ref 0.4–4.5)
WBC # BLD AUTO: 7.1 THOUSAND/UL (ref 3.8–10.8)

## 2025-03-12 ENCOUNTER — APPOINTMENT (OUTPATIENT)
Dept: PRIMARY CARE | Facility: CLINIC | Age: 65
End: 2025-03-12
Payer: MEDICARE

## 2025-03-12 VITALS
HEART RATE: 88 BPM | HEIGHT: 63 IN | OXYGEN SATURATION: 95 % | SYSTOLIC BLOOD PRESSURE: 153 MMHG | DIASTOLIC BLOOD PRESSURE: 91 MMHG | BODY MASS INDEX: 28 KG/M2 | WEIGHT: 158 LBS

## 2025-03-12 DIAGNOSIS — G89.29 OTHER CHRONIC PAIN: ICD-10-CM

## 2025-03-12 DIAGNOSIS — E55.9 VITAMIN D DEFICIENCY, UNSPECIFIED: ICD-10-CM

## 2025-03-12 DIAGNOSIS — R00.2 PALPITATION: ICD-10-CM

## 2025-03-12 DIAGNOSIS — E11.9 TYPE 2 DIABETES MELLITUS WITHOUT COMPLICATION, WITHOUT LONG-TERM CURRENT USE OF INSULIN (MULTI): ICD-10-CM

## 2025-03-12 DIAGNOSIS — Z00.00 ROUTINE GENERAL MEDICAL EXAMINATION AT HEALTH CARE FACILITY: ICD-10-CM

## 2025-03-12 DIAGNOSIS — Z79.899 DRUG THERAPY: Primary | ICD-10-CM

## 2025-03-12 DIAGNOSIS — E55.9 VITAMIN D DEFICIENCY: ICD-10-CM

## 2025-03-12 DIAGNOSIS — E83.119 HEMOCHROMATOSIS, UNSPECIFIED HEMOCHROMATOSIS TYPE: ICD-10-CM

## 2025-03-12 DIAGNOSIS — E78.5 HYPERLIPIDEMIA, UNSPECIFIED HYPERLIPIDEMIA TYPE: ICD-10-CM

## 2025-03-12 DIAGNOSIS — R94.31 ABNORMAL EKG: ICD-10-CM

## 2025-03-12 DIAGNOSIS — Z13.9 SCREENING FOR CONDITION: ICD-10-CM

## 2025-03-12 DIAGNOSIS — F32.A DEPRESSION, UNSPECIFIED DEPRESSION TYPE: ICD-10-CM

## 2025-03-12 DIAGNOSIS — G25.81 RLS (RESTLESS LEGS SYNDROME): ICD-10-CM

## 2025-03-12 PROCEDURE — 99396 PREV VISIT EST AGE 40-64: CPT | Performed by: FAMILY MEDICINE

## 2025-03-12 PROCEDURE — 3008F BODY MASS INDEX DOCD: CPT | Performed by: FAMILY MEDICINE

## 2025-03-12 PROCEDURE — 3077F SYST BP >= 140 MM HG: CPT | Performed by: FAMILY MEDICINE

## 2025-03-12 PROCEDURE — 99214 OFFICE O/P EST MOD 30 MIN: CPT | Performed by: FAMILY MEDICINE

## 2025-03-12 PROCEDURE — G0439 PPPS, SUBSEQ VISIT: HCPCS | Performed by: FAMILY MEDICINE

## 2025-03-12 PROCEDURE — 3080F DIAST BP >= 90 MM HG: CPT | Performed by: FAMILY MEDICINE

## 2025-03-12 PROCEDURE — 4010F ACE/ARB THERAPY RXD/TAKEN: CPT | Performed by: FAMILY MEDICINE

## 2025-03-12 RX ORDER — OXYCODONE AND ACETAMINOPHEN 5; 325 MG/1; MG/1
1 TABLET ORAL EVERY 4 HOURS PRN
Qty: 180 TABLET | Refills: 0 | Status: SHIPPED | OUTPATIENT
Start: 2025-03-15 | End: 2025-04-14

## 2025-03-12 ASSESSMENT — PATIENT HEALTH QUESTIONNAIRE - PHQ9
2. FEELING DOWN, DEPRESSED OR HOPELESS: NOT AT ALL
SUM OF ALL RESPONSES TO PHQ9 QUESTIONS 1 AND 2: 0
1. LITTLE INTEREST OR PLEASURE IN DOING THINGS: NOT AT ALL

## 2025-03-12 ASSESSMENT — ENCOUNTER SYMPTOMS
DEPRESSION: 0
LOSS OF SENSATION IN FEET: 0
OCCASIONAL FEELINGS OF UNSTEADINESS: 0

## 2025-03-12 ASSESSMENT — ACTIVITIES OF DAILY LIVING (ADL)
GROCERY_SHOPPING: INDEPENDENT
DRESSING: INDEPENDENT
TAKING_MEDICATION: INDEPENDENT
DOING_HOUSEWORK: INDEPENDENT
BATHING: INDEPENDENT
MANAGING_FINANCES: INDEPENDENT

## 2025-03-12 NOTE — PROGRESS NOTES
Subjective   Patient ID: Bridget Hannon is a 64 y.o. female who presents for Medicare Annual Wellness Visit Subsequent.    Review of Systems   Respiratory:  Choking: bilateral leg pain.      Denies N/V/D/C, no HA/S/V, denies rashes/lesions, no CP/SOB. Denies fevers/chills.  All other systems were negative.  Positive for chronic pain multiple locations.    Objective   Physical Exam  Gen: NAD  eyes: EOMI, PERRLA  ENT: hearing grossly intact, no nasal discharge  resp: CTABL, without R/R  heart: RRR without MRG  GI: abd: S/ND/NT, BS+  lymph: no axillary, cervical, supraclavicular lymphadenopathy noted   MS: gait grossly WNL,  derm: no rashes or lesions noted  neuro: CN II-XII grossly intact  psych: A&Ox3    Assessment/Plan   Diagnoses and all orders for this visit:  Vitamin D deficiency  Other chronic pain  -     oxyCODONE-acetaminophen (Percocet) 5-325 mg tablet; Take 1 tablet by mouth every 4 hours if needed for severe pain (7 - 10). Do not fill before March 15, 2025.  Drug therapy  Screening for condition  Type 2 diabetes mellitus without complication, without long-term current use of insulin (Multi)  Hyperlipidemia, unspecified hyperlipidemia type  Palpitation  Hemochromatosis, unspecified hemochromatosis type  Vitamin D deficiency, unspecified  Depression, unspecified depression type  RLS (restless legs syndrome)  Abnormal EKG        Patient Discussion/Summary     March 2025: Doing CSA and UDS and annual lab.  Today: [facility] and annual preventative visit and lab review.     -------  Screen for breast cancer:. (Had hysterectomy around 2001). Had normal mammogram March 2022. -->>  Follow-up as planned with your mammogram.     Colon cancer screening: Next cologard due July 2027    Hepatitis C screenin g was negative January 2022.     Immunization counseling: Tdap February 2022. Due for Shingles immunizations.  UTD on annual flu shot.  Due for latest coronavirus booster, new RSV shot. -->>  Will administer flu shot  today.  Check with pharmacy to keep up-to-date on immunizations.     Cardiac CT scoring was 113, over read was negative. -->> We will plan to aggressively manage risk factors including LDL below 70.     -------      H/o Obesity, BMI 26, Down from maximum of 283 February 2020. Is post gastric bypass May 2021. -->>  Keep up the excellent work.       Chronic pain, right arm, low back, left shoulder, hip pain. Also having some R sided sciatica pain recently. R arm post many surgeries and injuries.  Pain worse recently.  Had left arm surgery for cyst.  Doing well.  Is now seeing pain management/Dr. Meza. Pain mgt asked us to continue prescribe Percocet while they work on other modalities.   Is currently on 6 Percocet a day. Failed Voltaren gel (ineffective). On duloxetine, 60mg daily, which seems to be at least a little better than the 30 mg. Finding it at least somewhat helpful.  Seeing rheumatology, Dr. Rodriguez.   Topicals from compounding pharmacy not covered.-->> Follow-up up with pain management, orthopedics, rheumatology as planned.  Could consider trying over-the-counter capsaicin topical to see if that helps pain.         Regarding previous labs:    -Dehydrated -->> drink more water especially before labs.    - Hyperlipidemia: numbers are excellent on no meds.-->> Keep up the excellent work. Patient is now post gastric bypass surgery.    - type 2 diabetes: A1c is 4.9, was 4.9, 5.2, 5.1, 6.1, 5.9, 6.3, 6.9, 5.8. Patient does note she has significantly cut back on sugar. No longer on Metformin. Will recheck A1c in 12 months    - Drug therapy -->> Plan to recheck annual labs early 2025.    - Vitamin D deficiency, 39, was 46, 54, 62, 81, 75, 57, 56, 67, 54, 49, 52, 42, 51, 25, 21, goal is . Post gastric bypass. Patient is currently on 100,000 units weekly. -->>  Continue 3 pills a week for total of 150,000 units weekly vitamin D2.  We will recheck in about 6 months with next annual labs.  regarding previous  labs:       Depression, in general does well with sertraline plus duloxetine. -->> Will refill as needed.  Recommend finding videos on meditation or visualizations and spend at least 5 to 15 minutes every day.     RLS, doing well enough on 1 mg ropinirole TID. -->> We will refill as needed.     Palpitations.  Patient previously saw cardiology, sounds like previous testing was grossly within normal limits, however palpitations are bothering her. -->>  Follow-up in a few weeks as already scheduled with cardiology/Dr. Palacios.        Patient did quit smoking January 5, 2019. -->> Keep up the most excellent work.      -Doing UDS today.  - patient will return in about 3 months for CS RF.Patient was identified as a fall risk. Risk prevention instructions provided.

## 2025-03-12 NOTE — PATIENT INSTRUCTIONS
March 2025: Doing CSA and UDS and annual lab.  Today: [facility] and annual preventative visit and lab review.     -------  Screen for breast cancer:. (Had hysterectomy around 2001). Had normal mammogram March 2022. -->>  Follow-up as planned with your mammogram.     Colon cancer screening: Next cologard due July 2027    Hepatitis C screenin g was negative January 2022.     Immunization counseling: Tdap February 2022. Due for Shingles immunizations.  UTD on annual flu shot.  Due for latest coronavirus booster, new RSV shot. -->>  Will administer flu shot today.  Check with pharmacy to keep up-to-date on immunizations.     Cardiac CT scoring was 113, over read was negative. -->> We will plan to aggressively manage risk factors including LDL below 70.     -------      H/o Obesity, BMI 26, Down from maximum of 283 February 2020. Is post gastric bypass May 2021. -->>  Keep up the excellent work.       Chronic pain, right arm, low back, left shoulder, hip pain. Also having some R sided sciatica pain recently. R arm post many surgeries and injuries.  Pain worse recently.  Had left arm surgery for cyst.  Doing well.  Is now seeing pain management/Dr. Meza. Pain mgt asked us to continue prescribe Percocet while they work on other modalities.   Is currently on 6 Percocet a day. Failed Voltaren gel (ineffective). On duloxetine, 60mg daily, which seems to be at least a little better than the 30 mg. Finding it at least somewhat helpful.  Seeing rheumatology, Dr. Rodriguez.   Topicals from compounding pharmacy not covered.-->> Follow-up up with pain management, orthopedics, rheumatology as planned.  Could consider trying over-the-counter capsaicin topical to see if that helps pain.         Regarding previous labs:    -Dehydrated -->> drink more water especially before labs.    - Hyperlipidemia: numbers are excellent on no meds.-->> Keep up the excellent work. Patient is now post gastric bypass surgery.    - type 2 diabetes: A1c is  4.9, was 4.9, 5.2, 5.1, 6.1, 5.9, 6.3, 6.9, 5.8. Patient does note she has significantly cut back on sugar. No longer on Metformin. Will recheck A1c in 12 months    - Drug therapy -->> Plan to recheck annual labs early 2025.    - Vitamin D deficiency, 39, was 46, 54, 62, 81, 75, 57, 56, 67, 54, 49, 52, 42, 51, 25, 21, goal is . Post gastric bypass. Patient is currently on 100,000 units weekly. -->>  Continue 3 pills a week for total of 150,000 units weekly vitamin D2.  We will recheck in about 6 months with next annual labs.  regarding previous labs:       Depression, in general does well with sertraline plus duloxetine. -->> Will refill as needed.  Recommend finding videos on meditation or visualizations and spend at least 5 to 15 minutes every day.     RLS, doing well enough on 1 mg ropinirole TID. -->> We will refill as needed.     Palpitations.  Patient previously saw cardiology, sounds like previous testing was grossly within normal limits, however palpitations are bothering her. -->>  Follow-up in a few weeks as already scheduled with cardiology/Dr. Palacios.        Patient did quit smoking January 5, 2019. -->> Keep up the most excellent work.      -Doing UDS today.  - patient will return in about 3 months for CS RF.                  Ways to Help Prevent Falls at Home    Quick Tips   ? Ask for help if you need it. Most people want to help!   ? Get up slowly after sitting or laying down   ? Wear a medical alert device or keep cell phone in your pocket   ? Use night lights, especially areas near a bathroom   ? Keep the items you use often within reach on a small stool or end table   ? Use an assistive device such as walker or cane, as directed by provider/physical therapy   ? Use a non-slip mat and grab bars in your bathroom. Look for home health sections for best options     Other Areas to Focus On   ? Exercise and nutrition: Regular exercise or taking a falls prevention class are great ways improve  strength and balance. Don’t forget to stay hydrated and bring a snack!   ? Medicine side effects: Some medicines can make you sleepy or dizzy, which could cause a fall. Ask your healthcare provider about the side effects your medicines could cause. Be sure to let them know if you take any vitamins or supplements as well.   ? Tripping hazards: Remove items you could trip on, such as loose mats, rugs, cords, and clutter. Wear closed toe shoes with rubber soles.   ? Health and wellness: Get regular checkups with your healthcare provider, plus routine vision and hearing screenings. Talk with your healthcare provider about:   o Your medicines and the possible side effects - bring them in a bag if that is easier!   o Problems with balance or feeling dizzy   o Ways to promote bone health, such as Vitamin D and calcium supplements   o Questions or concerns about falling     *Ask your healthcare team if you have questions     ©[facility], 2022

## 2025-03-14 LAB
AMPHETAMINES UR QL: NEGATIVE NG/ML
BARBITURATES UR QL: NEGATIVE NG/ML
BENZODIAZ UR QL: NEGATIVE NG/ML
BZE UR QL: NEGATIVE NG/ML
CODEINE UR-MCNC: NEGATIVE NG/ML
CREAT UR-MCNC: 102.3 MG/DL
DRUG SCREEN COMMENT UR-IMP: NORMAL
HYDROCODONE UR-MCNC: NEGATIVE NG/ML
HYDROMORPHONE UR-MCNC: NEGATIVE NG/ML
METHADONE UR QL: NEGATIVE NG/ML
MORPHINE UR-MCNC: NEGATIVE NG/ML
NORHYDROCODONE UR CFM-MCNC: NEGATIVE NG/ML
OPIATES UR QL: NORMAL NG/ML
OXIDANTS UR QL: NEGATIVE MCG/ML
OXYCODONE UR QL: NEGATIVE NG/ML
PCP UR QL: NEGATIVE NG/ML
PH UR: 5.9 [PH] (ref 4.5–9)
QUEST NOTES AND COMMENTS: NORMAL
THC UR QL: NEGATIVE NG/ML

## 2025-03-15 ENCOUNTER — HOSPITAL ENCOUNTER (OUTPATIENT)
Dept: RADIOLOGY | Facility: HOSPITAL | Age: 65
Discharge: HOME | End: 2025-03-15
Payer: MEDICARE

## 2025-03-15 VITALS — WEIGHT: 158 LBS | HEIGHT: 63 IN | BODY MASS INDEX: 28 KG/M2

## 2025-03-15 DIAGNOSIS — Z12.31 ENCOUNTER FOR SCREENING MAMMOGRAM FOR BREAST CANCER: ICD-10-CM

## 2025-03-15 PROCEDURE — 77063 BREAST TOMOSYNTHESIS BI: CPT | Performed by: RADIOLOGY

## 2025-03-15 PROCEDURE — 77067 SCR MAMMO BI INCL CAD: CPT | Performed by: RADIOLOGY

## 2025-03-15 PROCEDURE — 77067 SCR MAMMO BI INCL CAD: CPT

## 2025-04-01 DIAGNOSIS — G25.81 RLS (RESTLESS LEGS SYNDROME): ICD-10-CM

## 2025-04-02 RX ORDER — ROPINIROLE 1 MG/1
TABLET, FILM COATED ORAL
Qty: 300 TABLET | Refills: 3 | Status: SHIPPED | OUTPATIENT
Start: 2025-04-02

## 2025-04-03 ENCOUNTER — APPOINTMENT (OUTPATIENT)
Dept: CARDIOLOGY | Facility: CLINIC | Age: 65
End: 2025-04-03
Payer: MEDICARE

## 2025-04-11 DIAGNOSIS — G89.29 OTHER CHRONIC PAIN: ICD-10-CM

## 2025-04-11 RX ORDER — OXYCODONE AND ACETAMINOPHEN 5; 325 MG/1; MG/1
1 TABLET ORAL EVERY 4 HOURS PRN
Qty: 180 TABLET | Refills: 0 | Status: SHIPPED | OUTPATIENT
Start: 2025-04-14 | End: 2025-05-14

## 2025-04-22 ENCOUNTER — APPOINTMENT (OUTPATIENT)
Dept: RHEUMATOLOGY | Facility: CLINIC | Age: 65
End: 2025-04-22
Payer: MEDICARE

## 2025-04-22 VITALS
HEIGHT: 63 IN | HEART RATE: 75 BPM | WEIGHT: 159 LBS | DIASTOLIC BLOOD PRESSURE: 88 MMHG | BODY MASS INDEX: 28.17 KG/M2 | SYSTOLIC BLOOD PRESSURE: 144 MMHG

## 2025-04-22 DIAGNOSIS — M15.3 POST-TRAUMATIC OSTEOARTHRITIS OF MULTIPLE JOINTS: Primary | ICD-10-CM

## 2025-04-22 PROCEDURE — 3077F SYST BP >= 140 MM HG: CPT | Performed by: INTERNAL MEDICINE

## 2025-04-22 PROCEDURE — 20600 DRAIN/INJ JOINT/BURSA W/O US: CPT | Performed by: INTERNAL MEDICINE

## 2025-04-22 PROCEDURE — 99214 OFFICE O/P EST MOD 30 MIN: CPT | Performed by: INTERNAL MEDICINE

## 2025-04-22 PROCEDURE — 3008F BODY MASS INDEX DOCD: CPT | Performed by: INTERNAL MEDICINE

## 2025-04-22 PROCEDURE — 4010F ACE/ARB THERAPY RXD/TAKEN: CPT | Performed by: INTERNAL MEDICINE

## 2025-04-22 PROCEDURE — 3079F DIAST BP 80-89 MM HG: CPT | Performed by: INTERNAL MEDICINE

## 2025-04-22 RX ORDER — LIDOCAINE HYDROCHLORIDE 10 MG/ML
0.25 INJECTION, SOLUTION INFILTRATION; PERINEURAL
Status: COMPLETED | OUTPATIENT
Start: 2025-04-22 | End: 2025-04-22

## 2025-04-22 RX ADMIN — LIDOCAINE HYDROCHLORIDE 0.25 ML: 10 INJECTION, SOLUTION INFILTRATION; PERINEURAL at 11:14

## 2025-04-22 NOTE — PROGRESS NOTES
Subjective   Patient ID: [id number]   Bridget Hannon is a 64 y.o. female who presents for Joint Pain.  HPI    Follow up -  Osteoarthritis    Seen by Dr Dominique 7/2024  This is a 63 y.o. female with L CTS, left sided sciatica, OA of the back, CAD, HTN, DL, DM 2, vit D def, obesity s/p bariatric surgery, apnea and migraines, who presents for hand pains, referred by ortho Dr. Ewing . Assessment mechanical pains in her hands, shoulders and feet, worse in the hands with reported swelling in the left hand. Minimal MS. Has not tried steroids. Work up by Dr. Ewing is negative (RF, STEVEN, ESR, CRP). No imaging. No synovitis on exam.   HAD [facility] ultrasound ordered that showed minimal synovitis of the 2nd and 3rd left MCP with a hook like osteophyte, serologies and CRP normal  Has had traumatic injury to the right wrist from a fall/suicide attempt many years ago  And has mechanical aches in the wrist  Today c/o worsening pain in the 2nd left MCP > 3rd MCP  With difficulty making a fist, grasping oobjects, bending finger  She had left 3rd finger trigger release and reports worsening pains since then in the palmar aspect of her left hand.   Also has chronic lower back pain with radiation of pain in the legs and feet  Denies any episodic swelling of the wrists, elbows, knees or feet      ROS  10 system review unremarkable      Problem List[1]     Medical History[2]     Surgical History[3]     Social History     Socioeconomic History    Marital status:      Spouse name: Not on file    Number of children: Not on file    Years of education: Not on file    Highest education level: Not on file   Occupational History    Not on file   Tobacco Use    Smoking status: Former     Types: Cigarettes    Smokeless tobacco: Not on file   Substance and Sexual Activity    Alcohol use: Not Currently    Drug use: Never    Sexual activity: Not on file   Other Topics Concern    Not on file   Social History Narrative    Not on file      Social Drivers of Health     Financial Resource Strain: Not on file   Food Insecurity: Not on file   Transportation Needs: Not on file   Physical Activity: Not on file   Stress: Not on file   Social Connections: Not on file   Intimate Partner Violence: Not on file   Housing Stability: Not on file        RX Allergies[4]     Current Medications[5]       Objective     Visit Vitals  /88   Pulse 75        Physical Exam    No ultrasound evidence of chrondrocalcinosis in the left wrist  Or bilateral menisci or femoral hyaline cartilage performed today    Minimal synovitis of left 2nd MCP on ultrasound    General: AAOx3, Cooperative  Head: normocephalic, atraumatic  Eyes: EOMI, conjunctiva clear, sclera white, anicteric  Ears: no redness, swelling, tenderness  Nose: no deformity, no crusting   Throat/Mouth: No oral deformities, no cheek swelling, mucosa appear moist, no oral ulcers noted or loss of dentition   Neck/Lymph: FROM, trachea midline  Lungs: chest expansion symmetric. No respiratory distress.   Heart: RRR  Neuro: CN II-XII grossly intact, no focal deficit  Skin: No rashes, ulcers or photosensitive areas  MSK: Upper Extremities:  Hand/Fingers: No erythema, swelling, tenderness or warmth at DIP, PIP, or MCP joints, FROM grossly. Good hand . No nodules. No deformities   Wrists: No erythema, swelling, warmth or tenderness at wrist, FROM grossly  Elbows: No tenderness, swelling, erythema or warmth at elbows, FROM grossly. No nodules   Shoulders: No swelling, erythema, tenderness or warmth at shoulders. FROM  Lower Extremities:   Hips: No obvious deformities. No joint tenderness, normal ROM grossly. Log roll test negative bilaterally. Jonathan test is negative bilaterally. No trochanteric bursae TTP  Knees: No tenderness, deformities, swelling, rashes, or warmth, normal ROM grossly. No crepitus,      [unfilled]      Lab Results   Component Value Date    WBC 7.1 03/05/2025    HGB 14.7 03/05/2025    HCT  "43.8 03/05/2025    MCV 94.2 03/05/2025     03/05/2025        Chemistry    Lab Results   Component Value Date/Time     03/05/2025 0931    K 4.2 03/05/2025 0931     03/05/2025 0931    CO2 24 03/05/2025 0931    BUN 12 03/05/2025 0931    CREATININE 0.47 (L) 03/05/2025 0931    Lab Results   Component Value Date/Time    CALCIUM 9.0 03/05/2025 0931    ALKPHOS 122 03/05/2025 0931    AST 18 03/05/2025 0931    ALT 16 03/05/2025 0931    BILITOT 0.6 03/05/2025 0931           Lab Results   Component Value Date    CRP <0.10 05/24/2024      Lab Results   Component Value Date    STEVEN Negative 05/24/2024    RF 10 05/24/2024    SEDRATE 5 05/24/2024      No results found for: \"CKTOTAL\"  Lab Results   Component Value Date    NEUTROABS 5.03 03/01/2024      Lab Results   Component Value Date    FERRITIN 34 03/05/2025      Lab Results   Component Value Date    HEPCAB NONREACTIVE 01/15/2022      Lab Results   Component Value Date    ALT 16 03/05/2025    AST 18 03/05/2025    ALKPHOS 122 03/05/2025    BILITOT 0.6 03/05/2025      No results found for: \"PPD\"   Lab Results   Component Value Date    URICACID 4.5 05/24/2024      Lab Results   Component Value Date    PTH 45 03/05/2025    CALCIUM 9.0 03/05/2025    PHOS 4.8 (H) 03/05/2025      No results found for: \"SPEP\", \"UPEP\"   No results found for: \"ALBUR\", \"[id number]\"   .last          BI mammo bilateral screening tomosynthesis  Narrative: Interpreted By:  Arnold Vazquez,   STUDY:  BI MAMMO BILATERAL SCREENING TOMOSYNTHESIS; 3/15/2025 9:51 am      ACCESSION NUMBER(S):  [id number]      ORDERING CLINICIAN:  MC MCKINNEY      INDICATION:  Screening.      ,Z12.31 Encounter for screening mammogram for malignant neoplasm of  breast      COMPARISON:  03/2022, 03/24/2021      FINDINGS:  2D and tomosynthesis images were reviewed at 1 mm slice thickness.      Density:  There are scattered areas of fibroglandular density.      Fat necrosis is noted in the subareolar region of the right " breast.  No suspicious masses or calcifications are identified.      Impression: No mammographic evidence of malignancy.      BI-RADS CATEGORY:  BI-RADS Category:  2 Benign.  Recommendation:  Annual Screening.  Recommended Date:  1 Year.  Laterality:  Bilateral.              For any future breast imaging appointments, please call [phone] ([phone]).          MACRO:  None      Signed by: Arnold Vazquez 3/19/2025 3:37 PM  Dictation workstation:   [id number]     === 08/21/24 ===    XR HAND 3+ VIEWS BILATERAL    - Impression -  Mild-to-moderate osteoarthritis bilateral hands.    Some arthritic changes of the metacarpophalangeal joints with some  soft tissue swelling and some possible subtle calcinosis at the 2nd  and 3rd MCPs could suggest pseudogout.    Signed by: Magdi Payne 8/25/2024 10:53 AM  Dictation workstation:   [id number]   === 12/01/19 ===    - Impression -  1. Osteomyelitis of the radius just distal to the hardware, and  likely also at the level of the hardware based on radiographic  appearance (evaluation at the level hardware on MRI is limited due to  susceptibility).    2. Erosive changes of the proximal 1/2 of the residual ulnar  diaphysis with associated abnormal internal T2-hyperintense signal  consistent concerning for early osteomyelitis.    3. Two loculated fluid collections likely representing abscesses:    -5 cm x 4 cm x 5 cm in the mid aspect of forearm involving the deep  extensor compartment and extending into the subcutaneous fat located  1.5 cm deep to the skin surface (12 cm distal to the elbow, 8 cm  proximal to the wrist).    -2.2 cm x 2.5 cm x 2.2 cm within the proximal dorsal ulnar aspect of  the forearm just proximal to the truncated ulnar diaphysis and just  proximal to what appears to be soft tissue wound/deficiency. Tract of  fluid ridging from hardware extends into this collection, indicating  hardware-related infection.          Assessment/Plan   Diagnoses and all orders for  this visit:  Post-traumatic osteoarthritis of multiple joints  Has DJD spine, traumatic bilateral forearm and right wrist injuries from a fall  And diffuse JSN of the left 3rd MCP and 2nd MCP with possible perijoint caclficiation  No double contour sign on ultrasound, or other areas of chrondrocalcinosis appreciated  Diagnosis of pseudogout is not established     Offered CSI to the left 2nd MCP since it was more symptomatic  Performed under ultrasound guidance, see procedure notes    Follow up x 3 months  Patient ID: Bridget Hannon is a 64 y.o. female.    Small Joint Injection/Arthrocentesis: L index MCP on 4/22/2025 11:14 AM  Indications: pain  Details: 25 G needle, lateral approach  Medications: 10 mg triamcinolone acetonide 10 mg/mL; 0.25 mL lidocaine 10 mg/mL (1 %)    Under direct ultrasound guidance, 10 mg kenalog and 0.25 ml lidocaine injected in the 2nd MCP  Patient tolerated the procedure well  Consent was given by the patient. Patient was prepped and draped in the usual sterile fashion.              Rivera Roy MD FACR   of Medicine  [facility] - Department of Rheumatology  [facility]   Plan, including risks and benefits, was discussed with the patient, informed on how to reach us.     To schedule an appointment, call  [phone] [place] or call [phone] for 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    Elbow deformity    Excessive daytime sleepiness    Hyperlipidemia     Impingement syndrome of left shoulder    Insomnia    Migraine with aura and without status migrainosus, not intractable    Joint stiffness    Postoperative malabsorption ([facility])    S/P gastric bypass    Sleep apnea    Sleep disturbance    Spondylosis of lumbar region without myelopathy or radiculopathy    Chronic back pain    Chronic left shoulder pain    Chronic pain    Chronic left-sided low back pain with left-sided sciatica    De Quervain's tenosynovitis    Screening for condition    Drug therapy    Vitamin D deficiency, unspecified    Bilateral leg weakness    Bilateral leg pain    Other specified disorders of tendon, left hand    Trigger finger, left middle finger    Palpitation    Hemochromatosis   [2]   Past Medical History:  Diagnosis Date    Fever, unspecified 03/21/2017    Fever and chills    Osteomyelitis, unspecified 05/25/2022    Osteomyelitis, forearm    Personal history of (healed) traumatic fracture 02/23/2022    History of fracture of radius    Personal history of Methicillin resistant Staphylococcus aureus infection 09/18/2019    History of methicillin resistant Staphylococcus aureus infection    Personal history of other diseases of the circulatory system     History of hypertension    Personal history of other diseases of the nervous system and sense organs     History of sleep apnea    Personal history of other endocrine, nutritional and metabolic disease     History of diabetes mellitus    Personal history of other mental and behavioral disorders     History of depression    Personal history of other specified conditions 09/01/2017    History of vertigo    Tobacco use 07/26/2022    Nicotine use   [3]   Past Surgical History:  Procedure Laterality Date    OTHER SURGICAL HISTORY  09/18/2020    Knee surgery    OTHER SURGICAL HISTORY  12/11/2021    Gastric bypass surgery    OTHER SURGICAL HISTORY  08/12/2020    Forearm surgery    OTHER SURGICAL HISTORY  08/12/2020    Hysterectomy   [4]    Allergies  Allergen Reactions    Phentermine Palpitations   [5]   Current Outpatient Medications:     acetaminophen (Tylenol) 325 mg tablet, Take 1 tablet (325 mg) by mouth every 6 hours if needed., Disp: , Rfl:     albuterol 90 mcg/actuation inhaler, Inhale 2 puffs. q 4-6H prn SOB and/or wheeze, Disp: , Rfl:     DULoxetine (Cymbalta) 60 mg DR capsule, TAKE 1 CAPSULE BY MOUTH DAILY  (DO NOT CRUSH OR CHEW), Disp: 90 capsule, Rfl: 3    ergocalciferol (Vitamin D-2) 1250 mcg (50,000 units) capsule, Take 1 capsule 3 times a week with food, for example Monday, Wednesday, Friday., Disp: 12 capsule, Rfl: 5    gabapentin (Neurontin) 800 mg tablet, TAKE 1 TABLET BY MOUTH 3 TIMES  DAILY, Disp: 300 tablet, Rfl: 2    hydroCHLOROthiazide (HYDRODiuril) 25 mg tablet, Take 1 tablet (25 mg) by mouth once daily., Disp: , Rfl:     lidocaine (Lidoderm) 5 % patch, Apply once daily to affected area, leave there 12 hours, remove & discard patch within 12 hours or as directed by MD., Disp: 30 patch, Rfl: 1    losartan (Cozaar) 50 mg tablet, TAKE 1 TABLET BY MOUTH ONCE  DAILY AS DIRECTED, Disp: 100 tablet, Rfl: 2    oxyCODONE-acetaminophen (Percocet) 5-325 mg tablet, Take 1 tablet by mouth every 4 hours if needed for severe pain (7 - 10). Do not fill before April 14, 2025., Disp: 180 tablet, Rfl: 0    rOPINIRole (Requip) 1 mg tablet, TAKE 1 TABLET BY MOUTH UP TO 3  TIMES DAILY AS NEEDED FOR  RESTLESS LEG SYMPTOMS, Disp: 300 tablet, Rfl: 3

## 2025-04-22 NOTE — PATIENT INSTRUCTIONS
Take tylenol extra strength upto three times a day for pain    See pain medicine for the back pain    If the injection performed for the joint today helped, we can repeat it in 3 months

## 2025-05-12 DIAGNOSIS — G89.29 OTHER CHRONIC PAIN: ICD-10-CM

## 2025-05-13 RX ORDER — OXYCODONE AND ACETAMINOPHEN 5; 325 MG/1; MG/1
1 TABLET ORAL EVERY 4 HOURS PRN
Qty: 180 TABLET | Refills: 0 | Status: SHIPPED | OUTPATIENT
Start: 2025-05-14 | End: 2025-06-13

## 2025-05-15 ENCOUNTER — APPOINTMENT (OUTPATIENT)
Dept: CARDIOLOGY | Facility: CLINIC | Age: 65
End: 2025-05-15
Payer: MEDICARE

## 2025-05-20 ENCOUNTER — APPOINTMENT (OUTPATIENT)
Dept: ORTHOPEDIC SURGERY | Facility: CLINIC | Age: 65
End: 2025-05-20
Payer: MEDICARE

## 2025-05-20 DIAGNOSIS — M65.30 ACQUIRED TRIGGER FINGER: Primary | ICD-10-CM

## 2025-05-20 PROCEDURE — 99214 OFFICE O/P EST MOD 30 MIN: CPT | Performed by: ORTHOPAEDIC SURGERY

## 2025-05-20 PROCEDURE — 20550 NJX 1 TENDON SHEATH/LIGAMENT: CPT | Mod: LT | Performed by: ORTHOPAEDIC SURGERY

## 2025-05-20 PROCEDURE — 2500000004 HC RX 250 GENERAL PHARMACY W/ HCPCS (ALT 636 FOR OP/ED): Performed by: ORTHOPAEDIC SURGERY

## 2025-05-20 PROCEDURE — 20550 NJX 1 TENDON SHEATH/LIGAMENT: CPT | Performed by: ORTHOPAEDIC SURGERY

## 2025-05-20 PROCEDURE — 4010F ACE/ARB THERAPY RXD/TAKEN: CPT | Performed by: ORTHOPAEDIC SURGERY

## 2025-05-20 PROCEDURE — 99212 OFFICE O/P EST SF 10 MIN: CPT | Mod: 25 | Performed by: ORTHOPAEDIC SURGERY

## 2025-05-20 RX ORDER — LIDOCAINE HYDROCHLORIDE 10 MG/ML
0.5 INJECTION, SOLUTION INFILTRATION; PERINEURAL
Status: COMPLETED | OUTPATIENT
Start: 2025-05-20 | End: 2025-05-20

## 2025-05-20 RX ADMIN — LIDOCAINE HYDROCHLORIDE 0.5 ML: 10 INJECTION, SOLUTION INFILTRATION; PERINEURAL at 09:40

## 2025-05-20 RX ADMIN — TRIAMCINOLONE ACETONIDE 5 MG: 10 INJECTION, SUSPENSION INTRA-ARTICULAR; INTRALESIONAL at 09:40

## 2025-05-20 NOTE — PROGRESS NOTES
History present illness: Patient presents today for evaluation of the left small finger.  She describes new onset triggering ongoing approximately 2 months.  She was seen by rheumatology and told that she did not have an autoimmune disease.  At that time steroid injection was given to the left second MCP which seem to have helped.      Past medical history: The patient's past medical history, family history, social history, and review of systems were documented on the patient medical intake.  The updated data was reviewed in the electronic medical record.  History is negative except otherwise stated in history of present illness.        Physical examination:  General: Alert and oriented to person, place, and time.  No acute distress and breathing comfortably: Pleasant and cooperative with examination.  HEENT: Head is normocephalic and atraumatic.  Neck: Supple, no visible swelling.  Cardiovascular: No palpable tachycardia  Lungs: No audible wheezing or labored breathing  Abdomen: Nondistended.  Extremities: Evaluation of the left upper extremity finds the patient had palpable radial artery at the wrist with brisk capillary refill to all digits.  Patient has intact sensation to axillary radial median and ulnar nerves.  There are no open wounds.  There are no signs of infection.  There is no evidence of lymphedema or lymphatic streaking.  The patient has supple compartments to left arm forearm and hand.  Tenderness and triggering left small finger A1 pulley.      Radiology:      Assessment: Left small finger trigger finger      Plan: Treatment options were discussed including both operative and nonoperative strategies.  Patient elects for steroid injection to left small finger trigger finger and 4-week follow-up.  No x-rays upon return.        Procedure:  Hand / UE Inj/Asp: L small A1 for trigger finger on 5/20/2025 9:40 AM  Indications: pain and tendon swelling  Details: 25 G needle, volar approach  Medications: 5 mg  triamcinolone acetonide 10 mg/mL; 0.5 mL lidocaine 10 mg/mL (1 %)  Outcome: tolerated well, no immediate complications  Procedure, treatment alternatives, risks and benefits explained, specific risks discussed. Consent was given by the patient. Immediately prior to procedure a time out was called to verify the correct patient, procedure, equipment, support staff and site/side marked as required. Patient was prepped and draped in the usual sterile fashion.

## 2025-06-11 ENCOUNTER — APPOINTMENT (OUTPATIENT)
Dept: PRIMARY CARE | Facility: CLINIC | Age: 65
End: 2025-06-11
Payer: MEDICARE

## 2025-06-11 VITALS
DIASTOLIC BLOOD PRESSURE: 91 MMHG | BODY MASS INDEX: 27.82 KG/M2 | WEIGHT: 157 LBS | HEIGHT: 63 IN | OXYGEN SATURATION: 94 % | HEART RATE: 87 BPM | SYSTOLIC BLOOD PRESSURE: 149 MMHG

## 2025-06-11 DIAGNOSIS — Z79.899 DRUG THERAPY: ICD-10-CM

## 2025-06-11 DIAGNOSIS — Z13.9 SCREENING FOR CONDITION: ICD-10-CM

## 2025-06-11 DIAGNOSIS — Z00.00 ROUTINE GENERAL MEDICAL EXAMINATION AT HEALTH CARE FACILITY: ICD-10-CM

## 2025-06-11 DIAGNOSIS — Z98.84 BARIATRIC SURGERY STATUS: ICD-10-CM

## 2025-06-11 DIAGNOSIS — G25.81 RLS (RESTLESS LEGS SYNDROME): ICD-10-CM

## 2025-06-11 DIAGNOSIS — E78.5 HYPERLIPIDEMIA, UNSPECIFIED HYPERLIPIDEMIA TYPE: ICD-10-CM

## 2025-06-11 DIAGNOSIS — E55.9 VITAMIN D DEFICIENCY: ICD-10-CM

## 2025-06-11 DIAGNOSIS — R00.2 PALPITATION: ICD-10-CM

## 2025-06-11 DIAGNOSIS — E11.9 TYPE 2 DIABETES MELLITUS WITHOUT COMPLICATION, WITHOUT LONG-TERM CURRENT USE OF INSULIN: ICD-10-CM

## 2025-06-11 DIAGNOSIS — G89.29 OTHER CHRONIC PAIN: Primary | ICD-10-CM

## 2025-06-11 DIAGNOSIS — E55.9 VITAMIN D DEFICIENCY, UNSPECIFIED: ICD-10-CM

## 2025-06-11 DIAGNOSIS — F32.A DEPRESSION, UNSPECIFIED DEPRESSION TYPE: ICD-10-CM

## 2025-06-11 PROCEDURE — 4010F ACE/ARB THERAPY RXD/TAKEN: CPT | Performed by: FAMILY MEDICINE

## 2025-06-11 PROCEDURE — 99214 OFFICE O/P EST MOD 30 MIN: CPT | Performed by: FAMILY MEDICINE

## 2025-06-11 PROCEDURE — 3008F BODY MASS INDEX DOCD: CPT | Performed by: FAMILY MEDICINE

## 2025-06-11 PROCEDURE — 3080F DIAST BP >= 90 MM HG: CPT | Performed by: FAMILY MEDICINE

## 2025-06-11 PROCEDURE — G2211 COMPLEX E/M VISIT ADD ON: HCPCS | Performed by: FAMILY MEDICINE

## 2025-06-11 PROCEDURE — 3077F SYST BP >= 140 MM HG: CPT | Performed by: FAMILY MEDICINE

## 2025-06-11 RX ORDER — OXYCODONE AND ACETAMINOPHEN 5; 325 MG/1; MG/1
1 TABLET ORAL EVERY 4 HOURS PRN
Qty: 180 TABLET | Refills: 0 | Status: SHIPPED | OUTPATIENT
Start: 2025-06-13 | End: 2025-07-13

## 2025-06-11 NOTE — PATIENT INSTRUCTIONS
March 2026: Doing CSA and UDS and annual lab.  2026 next [facility] and annual preventative visit.     -------  Screen for breast cancer:. (Had hysterectomy around 2001). -->>  Next mammogram due around March 16, 2026.       Colon cancer screening: Next cologard due July 2027    Hepatitis C screenin g was negative January 2022.     Immunization counseling: Tdap February 2022. Due for Shingles immunizations.  UTD on annual flu shot.  Due for latest coronavirus booster, new RSV shot. -->>  Check with pharmacy to keep up-to-date on immunizations.     Cardiac CT scoring was 113, over read was negative. -->> We will plan to aggressively manage risk factors including LDL below 70.     -------      H/o Obesity, BMI 27, Down from maximum of 283 February 2020. Is post gastric bypass May 2021. -->>  Keep up the excellent work.       Chronic pain, right arm, low back, left shoulder, hip pain. Also having some R sided sciatica pain recently. R arm post many surgeries and injuries. Is now seeing pain management/Dr. Meza. Pain mgt asked us to continue prescribe Percocet while they work on other modalities.   Is currently on 6 Percocet a day. Failed Voltaren gel (ineffective). On duloxetine, 60mg daily, which seems to be at least a little better than the 30 mg. Finding it at least somewhat helpful.  Seeing rheumatology, Dr. Rodriguez, who said he believes patient's chronic pain is stemming from back so needs to follow-up with pain management, longer needs to see rheumatology..   Topicals from compounding pharmacy not covered.  Has tried over-the-counter capsaicin, not helping much. -->> Follow-up up with pain management, orthopedics, as planned.      Regarding previous labs:    -Dehydrated -->> drink more water especially before labs.    - Hyperlipidemia: numbers are excellent on no meds.-->> Keep up the excellent work. Patient is now post gastric bypass surgery.    - type 2 diabetes: A1c is 4.9, was 4.9, 5.2, 5.1, 6.1, 5.9, 6.3, 6.9,  5.8. Patient does note she has significantly cut back on sugar. No longer on Metformin. Will recheck A1c in 12 months    - Drug therapy -->> Plan to recheck annual labs early 2026.    - Vitamin D deficiency, 39, was 46, 54, 62, 81, 75, 57, 56, 67, 54, 49, 52, 42, 51, 25, 21, goal is . Post gastric bypass. Patient is currently on 150,000 units weekly. -->>  Continue 3 pills a week for total of 150,000 units weekly vitamin D2.  We will recheck in with next annual labs.        Depression, in general does well with sertraline plus duloxetine. -->> Will refill as needed.  Recommend finding videos on meditation or visualizations and spend at least 5 to 15 minutes every day.     RLS, doing well enough (if not great) on 1 mg ropinirole TID.  Usually just takes it twice a day and that does well enough.-->> We will refill as needed.     Palpitations.  Is seeing cardiology. -->>  Follow-up s already scheduled with cardiology/Dr. Palacios.        Patient did quit smoking January 5, 2019. -->> Keep up the most excellent work.      - patient will return in about 3 months for CS RF.Patient was identified as a fall risk. Risk prevention instructions provided.

## 2025-06-11 NOTE — PROGRESS NOTES
Subjective   Patient ID: Bridget Hannon is a 64 y.o. female who presents for CS Refill FU (Pt in today for routine controlled substance refill FU).    Review of Systems   Respiratory:  Choking: bilateral leg pain.      Denies N/V/D/C, no HA/S/V, denies rashes/lesions, no CP/SOB. Denies fevers/chills.  All other systems were negative.  Positive for chronic pain multiple locations.    Objective   Physical Exam  Gen: NAD  eyes: EOMI, PERRLA  ENT: hearing grossly intact, no nasal discharge  resp: CTABL, without R/R  heart: RRR without MRG  GI: abd: S/ND/NT, BS+  lymph: no axillary, cervical, supraclavicular lymphadenopathy noted   MS: gait grossly WNL,  derm: no rashes or lesions noted  neuro: CN II-XII grossly intact  psych: A&Ox3    Assessment/Plan   Diagnoses and all orders for this visit:  Other chronic pain  -     oxyCODONE-acetaminophen (Percocet) 5-325 mg tablet; Take 1 tablet by mouth every 4 hours if needed for severe pain (7 - 10). Do not fill before June 13, 2025.  Routine general medical examination at health care facility  -     1 Year Follow Up In Primary Care - Wellness Exam  RLS (restless legs syndrome)  Vitamin D deficiency  Drug therapy  Type 2 diabetes mellitus without complication, without long-term current use of insulin  Vitamin D deficiency, unspecified  Screening for condition  Hyperlipidemia, unspecified hyperlipidemia type  Palpitation  Depression, unspecified depression type  Bariatric surgery status        Patient Discussion/Summary     March 2026: Doing CSA and UDS and annual lab.  2026 next [facility] and annual preventative visit.     -------  Screen for breast cancer:. (Had hysterectomy around 2001). -->>  Next mammogram due around March 16, 2026.       Colon cancer screening: Next cologard due July 2027    Hepatitis C screenin g was negative January 2022.     Immunization counseling: Tdap February 2022. Due for Shingles immunizations.  UTD on annual flu shot.  Due for latest coronavirus  booster, new RSV shot. -->>  Check with pharmacy to keep up-to-date on immunizations.     Cardiac CT scoring was 113, over read was negative. -->> We will plan to aggressively manage risk factors including LDL below 70.     -------      H/o Obesity, BMI 27, Down from maximum of 283 February 2020. Is post gastric bypass May 2021. -->>  Keep up the excellent work.       Chronic pain, right arm, low back, left shoulder, hip pain. Also having some R sided sciatica pain recently. R arm post many surgeries and injuries. Is now seeing pain management/Dr. Meza. Pain mgt asked us to continue prescribe Percocet while they work on other modalities.   Is currently on 6 Percocet a day. Failed Voltaren gel (ineffective). On duloxetine, 60mg daily, which seems to be at least a little better than the 30 mg. Finding it at least somewhat helpful.  Seeing rheumatology, Dr. Rodriguez, who said he believes patient's chronic pain is stemming from back so needs to follow-up with pain management, longer needs to see rheumatology..   Topicals from compounding pharmacy not covered.  Has tried over-the-counter capsaicin, not helping much. -->> Follow-up up with pain management, orthopedics, as planned.      Regarding previous labs:    -Dehydrated -->> drink more water especially before labs.    - Hyperlipidemia: numbers are excellent on no meds.-->> Keep up the excellent work. Patient is now post gastric bypass surgery.    - type 2 diabetes: A1c is 4.9, was 4.9, 5.2, 5.1, 6.1, 5.9, 6.3, 6.9, 5.8. Patient does note she has significantly cut back on sugar. No longer on Metformin. Will recheck A1c in 12 months    - Drug therapy -->> Plan to recheck annual labs early 2026.    - Vitamin D deficiency, 39, was 46, 54, 62, 81, 75, 57, 56, 67, 54, 49, 52, 42, 51, 25, 21, goal is . Post gastric bypass. Patient is currently on 150,000 units weekly. -->>  Continue 3 pills a week for total of 150,000 units weekly vitamin D2.  We will recheck in with  next annual labs.        Depression, in general does well with sertraline plus duloxetine. -->> Will refill as needed.  Recommend finding videos on meditation or visualizations and spend at least 5 to 15 minutes every day.     RLS, doing well enough (if not great) on 1 mg ropinirole TID.  Usually just takes it twice a day and that does well enough.-->> We will refill as needed.     Palpitations.  Is seeing cardiology. -->>  Follow-up already scheduled with cardiology/Dr. Palacios.        Patient did quit smoking January 5, 2019. -->> Keep up the most excellent work.      - patient will return in about 3 months for CS RF.Patient was identified as a fall risk. Risk prevention instructions provided.

## 2025-06-17 ENCOUNTER — APPOINTMENT (OUTPATIENT)
Dept: ORTHOPEDIC SURGERY | Facility: CLINIC | Age: 65
End: 2025-06-17
Payer: MEDICARE

## 2025-06-19 ENCOUNTER — APPOINTMENT (OUTPATIENT)
Dept: CARDIOLOGY | Facility: CLINIC | Age: 65
End: 2025-06-19
Payer: MEDICARE

## 2025-07-08 DIAGNOSIS — G89.29 OTHER CHRONIC PAIN: ICD-10-CM

## 2025-07-09 RX ORDER — OXYCODONE AND ACETAMINOPHEN 5; 325 MG/1; MG/1
1 TABLET ORAL EVERY 4 HOURS PRN
Qty: 180 TABLET | Refills: 0 | Status: SHIPPED | OUTPATIENT
Start: 2025-07-13 | End: 2025-08-12

## 2025-07-11 DIAGNOSIS — F32.A DEPRESSION, UNSPECIFIED DEPRESSION TYPE: Primary | ICD-10-CM

## 2025-07-11 RX ORDER — SERTRALINE HYDROCHLORIDE 100 MG/1
100 TABLET, FILM COATED ORAL DAILY
COMMUNITY
End: 2025-07-11 | Stop reason: SDUPTHER

## 2025-07-11 RX ORDER — SERTRALINE HYDROCHLORIDE 100 MG/1
100 TABLET, FILM COATED ORAL DAILY
Qty: 90 TABLET | Refills: 3 | Status: SHIPPED | OUTPATIENT
Start: 2025-07-11 | End: 2026-07-11

## 2025-07-22 ENCOUNTER — APPOINTMENT (OUTPATIENT)
Dept: RHEUMATOLOGY | Facility: CLINIC | Age: 65
End: 2025-07-22
Payer: MEDICARE

## 2025-07-26 ENCOUNTER — APPOINTMENT (OUTPATIENT)
Dept: RADIOLOGY | Facility: HOSPITAL | Age: 65
DRG: 690 | End: 2025-07-26
Payer: MEDICARE

## 2025-07-26 ENCOUNTER — HOSPITAL ENCOUNTER (INPATIENT)
Facility: HOSPITAL | Age: 65
DRG: 690 | End: 2025-07-26
Attending: EMERGENCY MEDICINE | Admitting: HOSPITALIST
Payer: MEDICARE

## 2025-07-26 DIAGNOSIS — K80.50 BILIARY PAIN: Primary | ICD-10-CM

## 2025-07-26 DIAGNOSIS — M47.20 RADICULOPATHY DUE TO SPONDYLOSIS: Chronic | ICD-10-CM

## 2025-07-26 PROBLEM — K83.8 DILATED BILE DUCT: Status: ACTIVE | Noted: 2025-07-26

## 2025-07-26 PROBLEM — N39.0 ACUTE UTI (URINARY TRACT INFECTION): Status: ACTIVE | Noted: 2025-07-26

## 2025-07-26 LAB
ALBUMIN SERPL BCP-MCNC: 4.3 G/DL (ref 3.4–5)
ALP SERPL-CCNC: 107 U/L (ref 33–136)
ALT SERPL W P-5'-P-CCNC: 14 U/L (ref 7–45)
ANION GAP SERPL CALC-SCNC: 11 MMOL/L (ref 10–20)
APPEARANCE UR: CLEAR
APTT PPP: 34 SECONDS (ref 26–36)
AST SERPL W P-5'-P-CCNC: 14 U/L (ref 9–39)
BACTERIA #/AREA URNS AUTO: ABNORMAL /HPF
BASOPHILS # BLD AUTO: 0.04 X10*3/UL (ref 0–0.1)
BASOPHILS NFR BLD AUTO: 0.5 %
BILIRUB SERPL-MCNC: 0.5 MG/DL (ref 0–1.2)
BILIRUB UR STRIP.AUTO-MCNC: NEGATIVE MG/DL
BUN SERPL-MCNC: 11 MG/DL (ref 6–23)
CALCIUM SERPL-MCNC: 9.4 MG/DL (ref 8.6–10.3)
CARDIAC TROPONIN I PNL SERPL HS: 4 NG/L (ref 0–13)
CARDIAC TROPONIN I PNL SERPL HS: 5 NG/L (ref 0–13)
CHLORIDE SERPL-SCNC: 107 MMOL/L (ref 98–107)
CO2 SERPL-SCNC: 25 MMOL/L (ref 21–32)
COLOR UR: ABNORMAL
CREAT SERPL-MCNC: 0.55 MG/DL (ref 0.5–1.05)
EGFRCR SERPLBLD CKD-EPI 2021: >90 ML/MIN/1.73M*2
EOSINOPHIL # BLD AUTO: 0.09 X10*3/UL (ref 0–0.7)
EOSINOPHIL NFR BLD AUTO: 1.1 %
ERYTHROCYTE [DISTWIDTH] IN BLOOD BY AUTOMATED COUNT: 11.8 % (ref 11.5–14.5)
GLUCOSE SERPL-MCNC: 111 MG/DL (ref 74–99)
GLUCOSE UR STRIP.AUTO-MCNC: NORMAL MG/DL
HCT VFR BLD AUTO: 44.9 % (ref 36–46)
HGB BLD-MCNC: 15.2 G/DL (ref 12–16)
IMM GRANULOCYTES # BLD AUTO: 0.02 X10*3/UL (ref 0–0.7)
IMM GRANULOCYTES NFR BLD AUTO: 0.2 % (ref 0–0.9)
INR PPP: 1.1 (ref 0.9–1.1)
KETONES UR STRIP.AUTO-MCNC: NEGATIVE MG/DL
LEUKOCYTE ESTERASE UR QL STRIP.AUTO: ABNORMAL
LIPASE SERPL-CCNC: 22 U/L (ref 9–82)
LYMPHOCYTES # BLD AUTO: 2.27 X10*3/UL (ref 1.2–4.8)
LYMPHOCYTES NFR BLD AUTO: 27.6 %
MAGNESIUM SERPL-MCNC: 2.22 MG/DL (ref 1.6–2.4)
MCH RBC QN AUTO: 31.9 PG (ref 26–34)
MCHC RBC AUTO-ENTMCNC: 33.9 G/DL (ref 32–36)
MCV RBC AUTO: 94 FL (ref 80–100)
MONOCYTES # BLD AUTO: 0.78 X10*3/UL (ref 0.1–1)
MONOCYTES NFR BLD AUTO: 9.5 %
MUCOUS THREADS #/AREA URNS AUTO: ABNORMAL /LPF
NEUTROPHILS # BLD AUTO: 5.02 X10*3/UL (ref 1.2–7.7)
NEUTROPHILS NFR BLD AUTO: 61.1 %
NITRITE UR QL STRIP.AUTO: NEGATIVE
NRBC BLD-RTO: 0 /100 WBCS (ref 0–0)
PH UR STRIP.AUTO: 6 [PH]
PLATELET # BLD AUTO: 237 X10*3/UL (ref 150–450)
POTASSIUM SERPL-SCNC: 3.8 MMOL/L (ref 3.5–5.3)
PROT SERPL-MCNC: 7.1 G/DL (ref 6.4–8.2)
PROT UR STRIP.AUTO-MCNC: NEGATIVE MG/DL
PROTHROMBIN TIME: 11.8 SECONDS (ref 9.8–12.4)
RBC # BLD AUTO: 4.76 X10*6/UL (ref 4–5.2)
RBC # UR STRIP.AUTO: NEGATIVE MG/DL
RBC #/AREA URNS AUTO: ABNORMAL /HPF
SODIUM SERPL-SCNC: 139 MMOL/L (ref 136–145)
SP GR UR STRIP.AUTO: 1.01
SQUAMOUS #/AREA URNS AUTO: ABNORMAL /HPF
UROBILINOGEN UR STRIP.AUTO-MCNC: NORMAL MG/DL
WBC # BLD AUTO: 8.2 X10*3/UL (ref 4.4–11.3)
WBC #/AREA URNS AUTO: ABNORMAL /HPF

## 2025-07-26 PROCEDURE — 96376 TX/PRO/DX INJ SAME DRUG ADON: CPT

## 2025-07-26 PROCEDURE — 36415 COLL VENOUS BLD VENIPUNCTURE: CPT | Performed by: STUDENT IN AN ORGANIZED HEALTH CARE EDUCATION/TRAINING PROGRAM

## 2025-07-26 PROCEDURE — 96374 THER/PROPH/DIAG INJ IV PUSH: CPT

## 2025-07-26 PROCEDURE — 2500000001 HC RX 250 WO HCPCS SELF ADMINISTERED DRUGS (ALT 637 FOR MEDICARE OP): Performed by: INTERNAL MEDICINE

## 2025-07-26 PROCEDURE — 84484 ASSAY OF TROPONIN QUANT: CPT | Performed by: EMERGENCY MEDICINE

## 2025-07-26 PROCEDURE — 99285 EMERGENCY DEPT VISIT HI MDM: CPT | Mod: 25 | Performed by: EMERGENCY MEDICINE

## 2025-07-26 PROCEDURE — 85610 PROTHROMBIN TIME: CPT | Performed by: EMERGENCY MEDICINE

## 2025-07-26 PROCEDURE — 2500000001 HC RX 250 WO HCPCS SELF ADMINISTERED DRUGS (ALT 637 FOR MEDICARE OP): Performed by: EMERGENCY MEDICINE

## 2025-07-26 PROCEDURE — 2550000001 HC RX 255 CONTRASTS: Performed by: EMERGENCY MEDICINE

## 2025-07-26 PROCEDURE — 74177 CT ABD & PELVIS W/CONTRAST: CPT | Performed by: RADIOLOGY

## 2025-07-26 PROCEDURE — 85025 COMPLETE CBC W/AUTO DIFF WBC: CPT | Performed by: STUDENT IN AN ORGANIZED HEALTH CARE EDUCATION/TRAINING PROGRAM

## 2025-07-26 PROCEDURE — 36415 COLL VENOUS BLD VENIPUNCTURE: CPT | Performed by: EMERGENCY MEDICINE

## 2025-07-26 PROCEDURE — 96361 HYDRATE IV INFUSION ADD-ON: CPT

## 2025-07-26 PROCEDURE — 96375 TX/PRO/DX INJ NEW DRUG ADDON: CPT

## 2025-07-26 PROCEDURE — 85730 THROMBOPLASTIN TIME PARTIAL: CPT | Performed by: EMERGENCY MEDICINE

## 2025-07-26 PROCEDURE — G0378 HOSPITAL OBSERVATION PER HR: HCPCS

## 2025-07-26 PROCEDURE — 2500000004 HC RX 250 GENERAL PHARMACY W/ HCPCS (ALT 636 FOR OP/ED): Performed by: EMERGENCY MEDICINE

## 2025-07-26 PROCEDURE — 85025 COMPLETE CBC W/AUTO DIFF WBC: CPT | Performed by: EMERGENCY MEDICINE

## 2025-07-26 PROCEDURE — 2500000004 HC RX 250 GENERAL PHARMACY W/ HCPCS (ALT 636 FOR OP/ED): Performed by: INTERNAL MEDICINE

## 2025-07-26 PROCEDURE — 74177 CT ABD & PELVIS W/CONTRAST: CPT

## 2025-07-26 PROCEDURE — 83690 ASSAY OF LIPASE: CPT | Performed by: EMERGENCY MEDICINE

## 2025-07-26 PROCEDURE — 99223 1ST HOSP IP/OBS HIGH 75: CPT | Performed by: INTERNAL MEDICINE

## 2025-07-26 PROCEDURE — 83735 ASSAY OF MAGNESIUM: CPT | Performed by: EMERGENCY MEDICINE

## 2025-07-26 PROCEDURE — 84075 ASSAY ALKALINE PHOSPHATASE: CPT | Performed by: EMERGENCY MEDICINE

## 2025-07-26 PROCEDURE — 81001 URINALYSIS AUTO W/SCOPE: CPT | Performed by: EMERGENCY MEDICINE

## 2025-07-26 RX ORDER — DIPHENHYDRAMINE HYDROCHLORIDE 50 MG/ML
25 INJECTION, SOLUTION INTRAMUSCULAR; INTRAVENOUS EVERY 6 HOURS PRN
Status: DISCONTINUED | OUTPATIENT
Start: 2025-07-26 | End: 2025-07-29 | Stop reason: HOSPADM

## 2025-07-26 RX ORDER — ALUMINUM HYDROXIDE, MAGNESIUM HYDROXIDE, AND SIMETHICONE 1200; 120; 1200 MG/30ML; MG/30ML; MG/30ML
30 SUSPENSION ORAL ONCE
Status: COMPLETED | OUTPATIENT
Start: 2025-07-26 | End: 2025-07-26

## 2025-07-26 RX ORDER — GABAPENTIN 400 MG/1
800 CAPSULE ORAL 3 TIMES DAILY
Status: DISCONTINUED | OUTPATIENT
Start: 2025-07-26 | End: 2025-07-29 | Stop reason: HOSPADM

## 2025-07-26 RX ORDER — ONDANSETRON HYDROCHLORIDE 2 MG/ML
4 INJECTION, SOLUTION INTRAVENOUS EVERY 8 HOURS PRN
Status: DISCONTINUED | OUTPATIENT
Start: 2025-07-26 | End: 2025-07-29 | Stop reason: HOSPADM

## 2025-07-26 RX ORDER — ALBUTEROL SULFATE 90 UG/1
2 INHALANT RESPIRATORY (INHALATION) EVERY 6 HOURS PRN
Status: DISCONTINUED | OUTPATIENT
Start: 2025-07-26 | End: 2025-07-29 | Stop reason: HOSPADM

## 2025-07-26 RX ORDER — MORPHINE SULFATE 4 MG/ML
4 INJECTION, SOLUTION INTRAMUSCULAR; INTRAVENOUS ONCE
Status: COMPLETED | OUTPATIENT
Start: 2025-07-26 | End: 2025-07-26

## 2025-07-26 RX ORDER — PANTOPRAZOLE SODIUM 40 MG/10ML
40 INJECTION, POWDER, LYOPHILIZED, FOR SOLUTION INTRAVENOUS
Status: DISCONTINUED | OUTPATIENT
Start: 2025-07-27 | End: 2025-07-28

## 2025-07-26 RX ORDER — ROPINIROLE 1 MG/1
1 TABLET, FILM COATED ORAL 3 TIMES DAILY
Status: DISCONTINUED | OUTPATIENT
Start: 2025-07-26 | End: 2025-07-27

## 2025-07-26 RX ORDER — ACETAMINOPHEN 325 MG/1
650 TABLET ORAL EVERY 4 HOURS PRN
Status: DISCONTINUED | OUTPATIENT
Start: 2025-07-26 | End: 2025-07-27

## 2025-07-26 RX ORDER — ONDANSETRON 4 MG/1
4 TABLET, FILM COATED ORAL EVERY 8 HOURS PRN
Status: DISCONTINUED | OUTPATIENT
Start: 2025-07-26 | End: 2025-07-29 | Stop reason: HOSPADM

## 2025-07-26 RX ORDER — PANTOPRAZOLE SODIUM 40 MG/10ML
40 INJECTION, POWDER, LYOPHILIZED, FOR SOLUTION INTRAVENOUS DAILY
Status: DISCONTINUED | OUTPATIENT
Start: 2025-07-26 | End: 2025-07-28

## 2025-07-26 RX ORDER — ENOXAPARIN SODIUM 100 MG/ML
40 INJECTION SUBCUTANEOUS EVERY 24 HOURS
Status: DISCONTINUED | OUTPATIENT
Start: 2025-07-26 | End: 2025-07-29 | Stop reason: HOSPADM

## 2025-07-26 RX ORDER — SERTRALINE HYDROCHLORIDE 50 MG/1
100 TABLET, FILM COATED ORAL DAILY
Status: DISCONTINUED | OUTPATIENT
Start: 2025-07-27 | End: 2025-07-29 | Stop reason: HOSPADM

## 2025-07-26 RX ORDER — POLYETHYLENE GLYCOL 3350 17 G/17G
17 POWDER, FOR SOLUTION ORAL DAILY PRN
Status: DISCONTINUED | OUTPATIENT
Start: 2025-07-26 | End: 2025-07-29 | Stop reason: HOSPADM

## 2025-07-26 RX ORDER — ACETAMINOPHEN 500 MG
5 TABLET ORAL NIGHTLY PRN
Status: DISCONTINUED | OUTPATIENT
Start: 2025-07-26 | End: 2025-07-29 | Stop reason: HOSPADM

## 2025-07-26 RX ORDER — CEFTRIAXONE 1 G/50ML
1 INJECTION, SOLUTION INTRAVENOUS EVERY 24 HOURS
Status: COMPLETED | OUTPATIENT
Start: 2025-07-26 | End: 2025-07-28

## 2025-07-26 RX ORDER — ACETAMINOPHEN 650 MG/1
650 SUPPOSITORY RECTAL EVERY 4 HOURS PRN
Status: DISCONTINUED | OUTPATIENT
Start: 2025-07-26 | End: 2025-07-27

## 2025-07-26 RX ORDER — ACETAMINOPHEN 160 MG/5ML
650 SOLUTION ORAL EVERY 4 HOURS PRN
Status: DISCONTINUED | OUTPATIENT
Start: 2025-07-26 | End: 2025-07-27

## 2025-07-26 RX ORDER — ONDANSETRON HYDROCHLORIDE 2 MG/ML
4 INJECTION, SOLUTION INTRAVENOUS ONCE
Status: COMPLETED | OUTPATIENT
Start: 2025-07-26 | End: 2025-07-26

## 2025-07-26 RX ORDER — LOSARTAN POTASSIUM 50 MG/1
50 TABLET ORAL DAILY
Status: DISCONTINUED | OUTPATIENT
Start: 2025-07-26 | End: 2025-07-29 | Stop reason: HOSPADM

## 2025-07-26 RX ORDER — PANTOPRAZOLE SODIUM 40 MG/1
40 TABLET, DELAYED RELEASE ORAL
Status: DISCONTINUED | OUTPATIENT
Start: 2025-07-27 | End: 2025-07-29 | Stop reason: HOSPADM

## 2025-07-26 RX ADMIN — GABAPENTIN 800 MG: 400 CAPSULE ORAL at 23:28

## 2025-07-26 RX ADMIN — SODIUM CHLORIDE 1000 ML: 0.9 INJECTION, SOLUTION INTRAVENOUS at 15:23

## 2025-07-26 RX ADMIN — PANTOPRAZOLE SODIUM 40 MG: 40 INJECTION, POWDER, FOR SOLUTION INTRAVENOUS at 18:46

## 2025-07-26 RX ADMIN — ROPINIROLE 1 MG: 1 TABLET, FILM COATED ORAL at 23:28

## 2025-07-26 RX ADMIN — ALUMINUM HYDROXIDE, MAGNESIUM HYDROXIDE, AND SIMETHICONE 30 ML: 200; 200; 20 SUSPENSION ORAL at 18:47

## 2025-07-26 RX ADMIN — CEFTRIAXONE 1 G: 1 INJECTION, SOLUTION INTRAVENOUS at 23:28

## 2025-07-26 RX ADMIN — ONDANSETRON 4 MG: 2 INJECTION INTRAMUSCULAR; INTRAVENOUS at 15:21

## 2025-07-26 RX ADMIN — MORPHINE SULFATE 4 MG: 4 INJECTION, SOLUTION INTRAMUSCULAR; INTRAVENOUS at 18:45

## 2025-07-26 RX ADMIN — MORPHINE SULFATE 4 MG: 4 INJECTION, SOLUTION INTRAMUSCULAR; INTRAVENOUS at 15:20

## 2025-07-26 RX ADMIN — IOHEXOL 75 ML: 350 INJECTION, SOLUTION INTRAVENOUS at 15:57

## 2025-07-26 RX ADMIN — LOSARTAN POTASSIUM 50 MG: 50 TABLET, FILM COATED ORAL at 23:28

## 2025-07-26 SDOH — SOCIAL STABILITY: SOCIAL INSECURITY: ARE YOU OR HAVE YOU BEEN THREATENED OR ABUSED PHYSICALLY, EMOTIONALLY, OR SEXUALLY BY ANYONE?: NO

## 2025-07-26 SDOH — ECONOMIC STABILITY: FOOD INSECURITY: WITHIN THE PAST 12 MONTHS, YOU WORRIED THAT YOUR FOOD WOULD RUN OUT BEFORE YOU GOT THE MONEY TO BUY MORE.: NEVER TRUE

## 2025-07-26 SDOH — SOCIAL STABILITY: SOCIAL INSECURITY: HAVE YOU HAD THOUGHTS OF HARMING ANYONE ELSE?: NO

## 2025-07-26 SDOH — ECONOMIC STABILITY: FOOD INSECURITY: HOW HARD IS IT FOR YOU TO PAY FOR THE VERY BASICS LIKE FOOD, HOUSING, MEDICAL CARE, AND HEATING?: SOMEWHAT HARD

## 2025-07-26 SDOH — SOCIAL STABILITY: SOCIAL INSECURITY: DO YOU FEEL ANYONE HAS EXPLOITED OR TAKEN ADVANTAGE OF YOU FINANCIALLY OR OF YOUR PERSONAL PROPERTY?: NO

## 2025-07-26 SDOH — SOCIAL STABILITY: SOCIAL INSECURITY: DOES ANYONE TRY TO KEEP YOU FROM HAVING/CONTACTING OTHER FRIENDS OR DOING THINGS OUTSIDE YOUR HOME?: NO

## 2025-07-26 SDOH — SOCIAL STABILITY: SOCIAL INSECURITY: WITHIN THE LAST YEAR, HAVE YOU BEEN AFRAID OF YOUR PARTNER OR EX-PARTNER?: NO

## 2025-07-26 SDOH — ECONOMIC STABILITY: FOOD INSECURITY: WITHIN THE PAST 12 MONTHS, THE FOOD YOU BOUGHT JUST DIDN'T LAST AND YOU DIDN'T HAVE MONEY TO GET MORE.: NEVER TRUE

## 2025-07-26 SDOH — SOCIAL STABILITY: SOCIAL INSECURITY: WITHIN THE LAST YEAR, HAVE YOU BEEN HUMILIATED OR EMOTIONALLY ABUSED IN OTHER WAYS BY YOUR PARTNER OR EX-PARTNER?: NO

## 2025-07-26 SDOH — ECONOMIC STABILITY: INCOME INSECURITY: IN THE PAST 12 MONTHS HAS THE ELECTRIC, GAS, OIL, OR WATER COMPANY THREATENED TO SHUT OFF SERVICES IN YOUR HOME?: NO

## 2025-07-26 SDOH — SOCIAL STABILITY: SOCIAL INSECURITY
WITHIN THE LAST YEAR, HAVE YOU BEEN KICKED, HIT, SLAPPED, OR OTHERWISE PHYSICALLY HURT BY YOUR PARTNER OR EX-PARTNER?: NO

## 2025-07-26 SDOH — SOCIAL STABILITY: SOCIAL INSECURITY
WITHIN THE LAST YEAR, HAVE YOU BEEN RAPED OR FORCED TO HAVE ANY KIND OF SEXUAL ACTIVITY BY YOUR PARTNER OR EX-PARTNER?: NO

## 2025-07-26 SDOH — SOCIAL STABILITY: SOCIAL INSECURITY: DO YOU FEEL UNSAFE GOING BACK TO THE PLACE WHERE YOU ARE LIVING?: NO

## 2025-07-26 SDOH — SOCIAL STABILITY: SOCIAL INSECURITY: HAVE YOU HAD ANY THOUGHTS OF HARMING ANYONE ELSE?: NO

## 2025-07-26 SDOH — SOCIAL STABILITY: SOCIAL INSECURITY: ARE THERE ANY APPARENT SIGNS OF INJURIES/BEHAVIORS THAT COULD BE RELATED TO ABUSE/NEGLECT?: NO

## 2025-07-26 SDOH — SOCIAL STABILITY: SOCIAL INSECURITY: HAS ANYONE EVER THREATENED TO HURT YOUR FAMILY OR YOUR PETS?: NO

## 2025-07-26 ASSESSMENT — COGNITIVE AND FUNCTIONAL STATUS - GENERAL
PATIENT BASELINE BEDBOUND: NO
DAILY ACTIVITIY SCORE: 24
MOBILITY SCORE: 24

## 2025-07-26 ASSESSMENT — ACTIVITIES OF DAILY LIVING (ADL)
HEARING - RIGHT EAR: FUNCTIONAL
FEEDING YOURSELF: INDEPENDENT
GROOMING: INDEPENDENT
ASSISTIVE_DEVICE: EYEGLASSES
TOILETING: INDEPENDENT
LACK_OF_TRANSPORTATION: NO
HEARING - LEFT EAR: FUNCTIONAL
WALKS IN HOME: INDEPENDENT
JUDGMENT_ADEQUATE_SAFELY_COMPLETE_DAILY_ACTIVITIES: YES
ADEQUATE_TO_COMPLETE_ADL: YES
BATHING: INDEPENDENT
DRESSING YOURSELF: INDEPENDENT
PATIENT'S MEMORY ADEQUATE TO SAFELY COMPLETE DAILY ACTIVITIES?: YES

## 2025-07-26 ASSESSMENT — PATIENT HEALTH QUESTIONNAIRE - PHQ9
1. LITTLE INTEREST OR PLEASURE IN DOING THINGS: NOT AT ALL
SUM OF ALL RESPONSES TO PHQ9 QUESTIONS 1 & 2: 1
2. FEELING DOWN, DEPRESSED OR HOPELESS: SEVERAL DAYS

## 2025-07-26 ASSESSMENT — LIFESTYLE VARIABLES
SKIP TO QUESTIONS 9-10: 1
HOW OFTEN DO YOU HAVE 6 OR MORE DRINKS ON ONE OCCASION: NEVER
HOW MANY STANDARD DRINKS CONTAINING ALCOHOL DO YOU HAVE ON A TYPICAL DAY: 1 OR 2
HAVE YOU EVER FELT YOU SHOULD CUT DOWN ON YOUR DRINKING: NO
HAVE PEOPLE ANNOYED YOU BY CRITICIZING YOUR DRINKING: NO
EVER HAD A DRINK FIRST THING IN THE MORNING TO STEADY YOUR NERVES TO GET RID OF A HANGOVER: NO
HOW OFTEN DO YOU HAVE A DRINK CONTAINING ALCOHOL: MONTHLY OR LESS
AUDIT-C TOTAL SCORE: 1
AUDIT-C TOTAL SCORE: 1
TOTAL SCORE: 0
EVER FELT BAD OR GUILTY ABOUT YOUR DRINKING: NO

## 2025-07-26 ASSESSMENT — PAIN DESCRIPTION - DESCRIPTORS: DESCRIPTORS: SPASM

## 2025-07-26 ASSESSMENT — PAIN SCALES - GENERAL
PAINLEVEL_OUTOF10: 8
PAINLEVEL_OUTOF10: 4
PAINLEVEL_OUTOF10: 4
PAINLEVEL_OUTOF10: 0 - NO PAIN

## 2025-07-26 ASSESSMENT — PAIN DESCRIPTION - LOCATION: LOCATION: ABDOMEN

## 2025-07-26 ASSESSMENT — PAIN DESCRIPTION - FREQUENCY: FREQUENCY: CONSTANT/CONTINUOUS

## 2025-07-26 ASSESSMENT — PAIN DESCRIPTION - PAIN TYPE: TYPE: ACUTE PAIN

## 2025-07-26 ASSESSMENT — PAIN DESCRIPTION - ONSET: ONSET: ONGOING

## 2025-07-26 ASSESSMENT — PAIN - FUNCTIONAL ASSESSMENT
PAIN_FUNCTIONAL_ASSESSMENT: 0-10
PAIN_FUNCTIONAL_ASSESSMENT: 0-10

## 2025-07-26 NOTE — ED PROVIDER NOTES
Emergency Department Provider Note       History of Present Illness     History provided by: Patient  Limitations to History: None  External Records Reviewed with Brief Summary: None    HPI:  Bridget Hannon is a 64 y.o. female with past medical history ofDepression, prior bariatric surgery, type 2 diabetes, restless leg syndrome does present with complaint of intermittent abdominal pain for the last 2 weeks.  Associated nausea but no vomiting.  Did have few episodes of diarrhea.  Has fairly diffuse pain throughout the abdomen but more upper to left lower quadrant.  No dysuria or hematuria.  No vaginal pain or bleeding or discharge.    Physical Exam   Triage vitals:  T 36.5 °C (97.7 °F)  HR 73  BP (!) 195/93  RR 16  O2 96 % None (Room air)    Physical Exam  Vitals and nursing note reviewed.   Constitutional:       General: She is not in acute distress.     Appearance: Normal appearance. She is normal weight. She is not ill-appearing.   HENT:      Head: Normocephalic and atraumatic.      Nose: Nose normal.      Mouth/Throat:      Mouth: Mucous membranes are moist.      Pharynx: Oropharynx is clear.     Eyes:      Extraocular Movements: Extraocular movements intact.      Conjunctiva/sclera: Conjunctivae normal.      Pupils: Pupils are equal, round, and reactive to light.       Cardiovascular:      Rate and Rhythm: Normal rate and regular rhythm.      Pulses: Normal pulses.      Heart sounds: Normal heart sounds.   Pulmonary:      Effort: Pulmonary effort is normal.      Breath sounds: Normal breath sounds. No stridor. No wheezing.   Abdominal:      General: Abdomen is flat. Bowel sounds are normal. There is no distension.      Palpations: Abdomen is soft.      Tenderness: There is generalized abdominal tenderness and tenderness in the epigastric area and left lower quadrant. There is no right CVA tenderness, left CVA tenderness, guarding or rebound.     Musculoskeletal:         General: No tenderness or  deformity. Normal range of motion.      Cervical back: Normal range of motion and neck supple.     Skin:     General: Skin is warm.      Capillary Refill: Capillary refill takes less than 2 seconds.      Coloration: Skin is not pale.      Findings: No bruising.     Neurological:      General: No focal deficit present.      Mental Status: She is alert and oriented to person, place, and time. Mental status is at baseline.      Sensory: No sensory deficit.      Motor: No weakness.     Psychiatric:         Mood and Affect: Mood normal.         Behavior: Behavior normal.         Thought Content: Thought content normal.         Judgment: Judgment normal.           Medical Decision Making & ED Course   Medical Decision Makin y.o. female here with complaint of 2 weeks of worsening epigastric to left lower quadrant abdominal pain.  Intermittent in nature.  There is associated nausea.  No prior history of ported diverticulitis.  IV was placed and labs drawn including CBC, CMP, UA, CT scan of the abdomen pelvis.  There was no elevated white blood cell count or systemic infection.  No clinically significant anemia.  No acute kidney injury.  No metabolic anion gap acidosis.  Magnesium was negative.  Lipase negative for pancreatitis.  UA was negative for infection.  CT scan of the abdomen pelvis showed.  Patient did receive morphine, Zofran, fluids here in the ED.  The CT scan of the abdomen pelvis does show concern for intrahepatic and extrahepatic biliary dilation.  On exam repeat she does have significant tenderness to this region.  Her LFTs however within normal limits and her lipase is within normal limits.  I do feel patient likely require MRCP given this discomfort was not lasted for 2 weeks.  I provide her with GI medication.  Case discussed with hospitalist for admission for MRCP.  ----      Differential diagnoses considered include but are not limited to: diverticulitis colitis ulcers gastritis    Social  Determinants of Health which Significantly Impact Care: Social Determinants of Health which Significantly Impact Care: None identified     EKG Independent Interpretation: EKG not obtained    Independent Result Review and Interpretation: Relevant laboratory and radiographic results were reviewed and independently interpreted by myself.  As necessary, they are commented on in the ED Course.    Chronic conditions affecting the patient's care: As documented above in MDM    The patient was discussed with the following consultants/services: None    Care Considerations: As documented above in [facility]    ED Course:  Diagnoses as of 07/26/25 1807   Biliary pain       Disposition   As a result of the work-up, the patient was discharged home.  she was informed of her diagnosis and instructed to come back with any concerns or worsening of condition.  she and was agreeable to the plan as discussed above.  she was given the opportunity to ask questions.  All of the patient's questions were answered.    Procedures   Procedures        Jesús Montoya MD  Emergency Medicine                                                       Jesús Montoya MD  07/26/25 [id number]

## 2025-07-27 ENCOUNTER — APPOINTMENT (OUTPATIENT)
Dept: RADIOLOGY | Facility: HOSPITAL | Age: 65
DRG: 690 | End: 2025-07-27
Payer: MEDICARE

## 2025-07-27 VITALS
HEART RATE: 64 BPM | WEIGHT: 156.53 LBS | BODY MASS INDEX: 27.73 KG/M2 | HEIGHT: 63 IN | RESPIRATION RATE: 16 BRPM | DIASTOLIC BLOOD PRESSURE: 63 MMHG | TEMPERATURE: 98.8 F | SYSTOLIC BLOOD PRESSURE: 130 MMHG | OXYGEN SATURATION: 92 %

## 2025-07-27 LAB
ALBUMIN SERPL BCP-MCNC: 4 G/DL (ref 3.4–5)
ALP SERPL-CCNC: 104 U/L (ref 33–136)
ALT SERPL W P-5'-P-CCNC: 12 U/L (ref 7–45)
ANION GAP SERPL CALC-SCNC: 11 MMOL/L (ref 10–20)
AST SERPL W P-5'-P-CCNC: 13 U/L (ref 9–39)
BILIRUB SERPL-MCNC: 0.5 MG/DL (ref 0–1.2)
BUN SERPL-MCNC: 11 MG/DL (ref 6–23)
CALCIUM SERPL-MCNC: 8.7 MG/DL (ref 8.6–10.3)
CHLORIDE SERPL-SCNC: 107 MMOL/L (ref 98–107)
CO2 SERPL-SCNC: 28 MMOL/L (ref 21–32)
CREAT SERPL-MCNC: 0.62 MG/DL (ref 0.5–1.05)
EGFRCR SERPLBLD CKD-EPI 2021: >90 ML/MIN/1.73M*2
ERYTHROCYTE [DISTWIDTH] IN BLOOD BY AUTOMATED COUNT: 11.9 % (ref 11.5–14.5)
GLUCOSE SERPL-MCNC: 93 MG/DL (ref 74–99)
HCT VFR BLD AUTO: 41.4 % (ref 36–46)
HGB BLD-MCNC: 14.2 G/DL (ref 12–16)
HOLD SPECIMEN: NORMAL
MCH RBC QN AUTO: 32.5 PG (ref 26–34)
MCHC RBC AUTO-ENTMCNC: 34.3 G/DL (ref 32–36)
MCV RBC AUTO: 95 FL (ref 80–100)
NRBC BLD-RTO: 0 /100 WBCS (ref 0–0)
PLATELET # BLD AUTO: 204 X10*3/UL (ref 150–450)
POTASSIUM SERPL-SCNC: 3.7 MMOL/L (ref 3.5–5.3)
PROT SERPL-MCNC: 6.3 G/DL (ref 6.4–8.2)
RBC # BLD AUTO: 4.37 X10*6/UL (ref 4–5.2)
SODIUM SERPL-SCNC: 142 MMOL/L (ref 136–145)
WBC # BLD AUTO: 6.2 X10*3/UL (ref 4.4–11.3)

## 2025-07-27 PROCEDURE — 80053 COMPREHEN METABOLIC PANEL: CPT | Performed by: INTERNAL MEDICINE

## 2025-07-27 PROCEDURE — 1210000001 HC SEMI-PRIVATE ROOM DAILY

## 2025-07-27 PROCEDURE — A9575 INJ GADOTERATE MEGLUMI 0.1ML: HCPCS | Performed by: INTERNAL MEDICINE

## 2025-07-27 PROCEDURE — 2500000002 HC RX 250 W HCPCS SELF ADMINISTERED DRUGS (ALT 637 FOR MEDICARE OP, ALT 636 FOR OP/ED): Performed by: INTERNAL MEDICINE

## 2025-07-27 PROCEDURE — 2500000001 HC RX 250 WO HCPCS SELF ADMINISTERED DRUGS (ALT 637 FOR MEDICARE OP): Performed by: INTERNAL MEDICINE

## 2025-07-27 PROCEDURE — 99232 SBSQ HOSP IP/OBS MODERATE 35: CPT | Performed by: HOSPITALIST

## 2025-07-27 PROCEDURE — 76376 3D RENDER W/INTRP POSTPROCES: CPT | Performed by: RADIOLOGY

## 2025-07-27 PROCEDURE — 74183 MRI ABD W/O CNTR FLWD CNTR: CPT | Performed by: RADIOLOGY

## 2025-07-27 PROCEDURE — 99222 1ST HOSP IP/OBS MODERATE 55: CPT | Performed by: INTERNAL MEDICINE

## 2025-07-27 PROCEDURE — 2500000004 HC RX 250 GENERAL PHARMACY W/ HCPCS (ALT 636 FOR OP/ED): Performed by: INTERNAL MEDICINE

## 2025-07-27 PROCEDURE — 2550000001 HC RX 255 CONTRASTS: Performed by: INTERNAL MEDICINE

## 2025-07-27 PROCEDURE — 85027 COMPLETE CBC AUTOMATED: CPT | Performed by: INTERNAL MEDICINE

## 2025-07-27 PROCEDURE — 36415 COLL VENOUS BLD VENIPUNCTURE: CPT | Performed by: INTERNAL MEDICINE

## 2025-07-27 PROCEDURE — 2500000001 HC RX 250 WO HCPCS SELF ADMINISTERED DRUGS (ALT 637 FOR MEDICARE OP): Performed by: HOSPITALIST

## 2025-07-27 PROCEDURE — 74183 MRI ABD W/O CNTR FLWD CNTR: CPT

## 2025-07-27 PROCEDURE — 2500000004 HC RX 250 GENERAL PHARMACY W/ HCPCS (ALT 636 FOR OP/ED): Performed by: EMERGENCY MEDICINE

## 2025-07-27 RX ORDER — LORAZEPAM 0.5 MG/1
0.5 TABLET ORAL ONCE
Status: COMPLETED | OUTPATIENT
Start: 2025-07-27 | End: 2025-07-27

## 2025-07-27 RX ORDER — KETOROLAC TROMETHAMINE 30 MG/ML
15 INJECTION, SOLUTION INTRAMUSCULAR; INTRAVENOUS EVERY 6 HOURS PRN
Status: DISCONTINUED | OUTPATIENT
Start: 2025-07-27 | End: 2025-07-29

## 2025-07-27 RX ORDER — GADOTERATE MEGLUMINE 376.9 MG/ML
0.2 INJECTION INTRAVENOUS
Status: COMPLETED | OUTPATIENT
Start: 2025-07-27 | End: 2025-07-27

## 2025-07-27 RX ORDER — ACETAMINOPHEN 325 MG/1
650 TABLET ORAL EVERY 4 HOURS PRN
Status: DISCONTINUED | OUTPATIENT
Start: 2025-07-27 | End: 2025-07-29 | Stop reason: HOSPADM

## 2025-07-27 RX ORDER — OXYCODONE AND ACETAMINOPHEN 5; 325 MG/1; MG/1
1 TABLET ORAL EVERY 6 HOURS PRN
Refills: 0 | Status: DISCONTINUED | OUTPATIENT
Start: 2025-07-27 | End: 2025-07-29 | Stop reason: HOSPADM

## 2025-07-27 RX ORDER — ROPINIROLE 1 MG/1
1 TABLET, FILM COATED ORAL 3 TIMES DAILY PRN
Status: DISCONTINUED | OUTPATIENT
Start: 2025-07-27 | End: 2025-07-29 | Stop reason: HOSPADM

## 2025-07-27 RX ORDER — ACETAMINOPHEN 650 MG/1
650 SUPPOSITORY RECTAL EVERY 4 HOURS PRN
Status: DISCONTINUED | OUTPATIENT
Start: 2025-07-27 | End: 2025-07-29 | Stop reason: HOSPADM

## 2025-07-27 RX ORDER — ACETAMINOPHEN 160 MG/5ML
650 SOLUTION ORAL EVERY 4 HOURS PRN
Status: DISCONTINUED | OUTPATIENT
Start: 2025-07-27 | End: 2025-07-29 | Stop reason: HOSPADM

## 2025-07-27 RX ADMIN — SERTRALINE HYDROCHLORIDE 100 MG: 50 TABLET, FILM COATED ORAL at 09:48

## 2025-07-27 RX ADMIN — ROPINIROLE 1 MG: 1 TABLET, FILM COATED ORAL at 09:48

## 2025-07-27 RX ADMIN — OXYCODONE HYDROCHLORIDE AND ACETAMINOPHEN 1 TABLET: 5; 325 TABLET ORAL at 23:37

## 2025-07-27 RX ADMIN — ROPINIROLE 1 MG: 1 TABLET, FILM COATED ORAL at 21:51

## 2025-07-27 RX ADMIN — PANTOPRAZOLE SODIUM 40 MG: 40 INJECTION, POWDER, FOR SOLUTION INTRAVENOUS at 09:48

## 2025-07-27 RX ADMIN — GABAPENTIN 800 MG: 400 CAPSULE ORAL at 21:51

## 2025-07-27 RX ADMIN — OXYCODONE HYDROCHLORIDE AND ACETAMINOPHEN 1 TABLET: 5; 325 TABLET ORAL at 09:48

## 2025-07-27 RX ADMIN — GABAPENTIN 800 MG: 400 CAPSULE ORAL at 09:48

## 2025-07-27 RX ADMIN — GABAPENTIN 800 MG: 400 CAPSULE ORAL at 15:23

## 2025-07-27 RX ADMIN — PANTOPRAZOLE SODIUM 40 MG: 40 TABLET, DELAYED RELEASE ORAL at 06:50

## 2025-07-27 RX ADMIN — ACETAMINOPHEN 650 MG: 325 TABLET ORAL at 04:41

## 2025-07-27 RX ADMIN — LORAZEPAM 0.5 MG: 0.5 TABLET ORAL at 12:24

## 2025-07-27 RX ADMIN — OXYCODONE HYDROCHLORIDE AND ACETAMINOPHEN 1 TABLET: 5; 325 TABLET ORAL at 17:20

## 2025-07-27 RX ADMIN — GADOTERATE MEGLUMINE 14 ML: 376.9 INJECTION INTRAVENOUS at 13:27

## 2025-07-27 RX ADMIN — CEFTRIAXONE 1 G: 1 INJECTION, SOLUTION INTRAVENOUS at 22:01

## 2025-07-27 SDOH — ECONOMIC STABILITY: HOUSING INSECURITY: IN THE LAST 12 MONTHS, HOW MANY PLACES HAVE YOU LIVED?: 1

## 2025-07-27 SDOH — ECONOMIC STABILITY: GENERAL

## 2025-07-27 SDOH — ECONOMIC STABILITY: HOUSING INSECURITY: IN THE LAST 12 MONTHS, WAS THERE A TIME WHEN YOU WERE NOT ABLE TO PAY THE MORTGAGE OR RENT ON TIME?: YES

## 2025-07-27 SDOH — ECONOMIC STABILITY: FOOD INSECURITY: WITHIN THE PAST 12 MONTHS, THE FOOD YOU BOUGHT JUST DIDN'T LAST AND YOU DIDN'T HAVE MONEY TO GET MORE.: SOMETIMES TRUE

## 2025-07-27 SDOH — ECONOMIC STABILITY: TRANSPORTATION INSECURITY

## 2025-07-27 SDOH — ECONOMIC STABILITY: FOOD INSECURITY

## 2025-07-27 SDOH — ECONOMIC STABILITY: INCOME INSECURITY: IN THE LAST 12 MONTHS, WAS THERE A TIME WHEN YOU WERE NOT ABLE TO PAY THE MORTGAGE OR RENT ON TIME?: YES

## 2025-07-27 SDOH — ECONOMIC STABILITY: FOOD INSECURITY: WITHIN THE PAST 12 MONTHS, YOU WORRIED THAT YOUR FOOD WOULD RUN OUT BEFORE YOU GOT MONEY TO BUY MORE.: NEVER TRUE

## 2025-07-27 SDOH — ECONOMIC STABILITY: HOUSING INSECURITY: IN THE PAST 12 MONTHS HAS THE ELECTRIC, GAS, OIL, OR WATER COMPANY THREATENED TO SHUT OFF SERVICES IN YOUR HOME?: YES

## 2025-07-27 SDOH — ECONOMIC STABILITY: HOUSING INSECURITY
IN THE LAST 12 MONTHS, WAS THERE A TIME WHEN YOU DID NOT HAVE A STEADY PLACE TO SLEEP OR SLEPT IN A SHELTER (INCLUDING NOW)?: NO

## 2025-07-27 SDOH — ECONOMIC STABILITY: FOOD INSECURITY: WITHIN THE PAST 12 MONTHS, YOU WORRIED THAT YOUR FOOD WOULD RUN OUT BEFORE YOU GOT THE MONEY TO BUY MORE.: NEVER TRUE

## 2025-07-27 SDOH — ECONOMIC STABILITY: TRANSPORTATION INSECURITY
IN THE PAST 12 MONTHS, HAS LACK OF TRANSPORTATION KEPT YOU FROM MEETINGS, WORK, OR FROM GETTING THINGS NEEDED FOR DAILY LIVING?: NO

## 2025-07-27 SDOH — ECONOMIC STABILITY: HOUSING INSECURITY

## 2025-07-27 ASSESSMENT — ENCOUNTER SYMPTOMS
UNEXPECTED WEIGHT CHANGE: 0
PALPITATIONS: 0
BACK PAIN: 0
CHILLS: 0
ARTHRALGIAS: 0
COUGH: 0
ROS GI COMMENTS: AS IN HPI
SHORTNESS OF BREATH: 0
FEVER: 0
FATIGUE: 0

## 2025-07-27 ASSESSMENT — PAIN SCALES - GENERAL
PAINLEVEL_OUTOF10: 8
PAINLEVEL_OUTOF10: 0 - NO PAIN
PAINLEVEL_OUTOF10: 7
PAINLEVEL_OUTOF10: 0 - NO PAIN
PAINLEVEL_OUTOF10: 8

## 2025-07-27 ASSESSMENT — COGNITIVE AND FUNCTIONAL STATUS - GENERAL
DAILY ACTIVITIY SCORE: 24
MOBILITY SCORE: 24

## 2025-07-27 ASSESSMENT — PAIN - FUNCTIONAL ASSESSMENT
PAIN_FUNCTIONAL_ASSESSMENT: 0-10

## 2025-07-27 ASSESSMENT — ACTIVITIES OF DAILY LIVING (ADL): LACK_OF_TRANSPORTATION: NO

## 2025-07-27 ASSESSMENT — PAIN DESCRIPTION - DESCRIPTORS: DESCRIPTORS: SHARP;SHOOTING

## 2025-07-27 ASSESSMENT — PAIN DESCRIPTION - LOCATION: LOCATION: BACK

## 2025-07-27 ASSESSMENT — PAIN DESCRIPTION - ORIENTATION: ORIENTATION: LOWER

## 2025-07-27 ASSESSMENT — SOCIAL DETERMINANTS OF HEALTH (SDOH): IN THE PAST 12 MONTHS, HAS THE ELECTRIC, GAS, OIL, OR WATER COMPANY THREATENED TO SHUT OFF SERVICE IN YOUR HOME?: YES

## 2025-07-27 NOTE — CARE PLAN
Over the shift, the patient did make progress toward the following goals.     The clinical goals for the shift include Patient will report pain at a tolerable level.

## 2025-07-27 NOTE — CONSULTS
Reason For Consult  Epigastric pain, biliary dilation    History Of Present Illness  Bridget Hannon is a 64 y.o. female presenting with epigastric/right upper quadrant pain for the past 3 weeks.  The pain is described as crampy/colicky, and occurs intermittently.  Radiates to the back.  It is exacerbated by eating, and typically occurs 1/2-hour after eating.  It may last 1-24 hours.  Yesterday, she was eating a plain piece of toast.  The pain started half hour later, and lasted until she got morphine in the ER 4-5 hours later.  No pain currently.  The pain is associated with nausea, but no vomiting.  She has had no bleeding or change in bowel habits.    Of note she had a Joana-en-Y gastric bypass at [facility] 4 years ago.  She lost a total of 150 pounds.  Her postsurgery weight decreased to 136 pounds.  In the last year she has gained 18 pounds, and is currently 154 pounds.    She has never had an EGD or colonoscopy.    She denies a family history of pancreatic cancers.     Past Medical History  She has a past medical history of Arthritis, Depression, Diabetes mellitus (Multi), Fever, unspecified (03/21/2017), Headache, Hypertension (2000), Low back pain (2017), Osteomyelitis, unspecified (05/25/2022), Personal history of (healed) traumatic fracture (02/23/2022), Personal history of Methicillin resistant Staphylococcus aureus infection (09/18/2019), Personal history of other diseases of the circulatory system, Personal history of other diseases of the nervous system and sense organs, Personal history of other endocrine, nutritional and metabolic disease, Personal history of other mental and behavioral disorders, Personal history of other specified conditions (09/01/2017), Rotator cuff syndrome (2017), Tobacco use (07/26/2022), and Trigger finger (2024).    She has no past medical history of Personal history of irradiation.    Surgical History  She has a past surgical history that includes Other surgical history  "(09/18/2020); Other surgical history (12/11/2021); Other surgical history (08/12/2020); Other surgical history (08/12/2020); Carpal tunnel release (2023); Plantar fascia release; and Hysterectomy (1998).     Social History  She reports that she has quit smoking. Her smoking use included cigarettes. She does not have any smokeless tobacco history on file. She reports current alcohol use. She reports that she does not use drugs.    Family History  Family History[1]     Allergies  Phentermine    Review of Systems  Review of Systems   Constitutional:  Negative for chills, fatigue, fever and unexpected weight change.   Respiratory:  Negative for cough and shortness of breath.    Cardiovascular:  Negative for chest pain, palpitations and leg swelling.   Gastrointestinal:         As in HPI   Musculoskeletal:  Negative for arthralgias, back pain and gait problem.          Physical Exam  General appearance: No acute distress, cooperative.  Eyes: Nonicteric, no redness or proptosis  Ears, nose, mouth, and throat: Moist mucous membranes, tongue normal; no oral lesions; Mallampati 2  Neck: Supple, no lymphadenopathy or thyromegaly  Lungs: Clear to auscultation bilaterally  CV: Regular rate and rhythm, no murmur; no pitting edema in the lower extremities  Abd: soft, non-tender; non-distended; normal active bowel sounds; +laparoscopy scars  Skin: No rashes or lesions; no liver stigmata  MSK: No deformities or joint edema/redness/tenderness  Neuro: Alert and oriented ×3, moves all 4       Last Recorded Vitals  Blood pressure 136/76, pulse 56, temperature 36.3 °C (97.3 °F), resp. rate 17, height 1.6 m (5' 2.99\"), weight 71 kg (156 lb 8.4 oz), SpO2 (!) 89%.    Relevant Results    Component      Latest Ref Rng 7/27/2025   WBC      4.4 - 11.3 x10*3/uL 6.2    nRBC      0.0 - 0.0 /100 WBCs 0.0    RBC      4.00 - 5.20 x10*6/uL 4.37    HEMOGLOBIN      12.0 - 16.0 g/dL 14.2    HEMATOCRIT      36.0 - 46.0 % 41.4    MCV      80 - 100 fL 95  "   MCH      26.0 - 34.0 pg 32.5    MCHC      32.0 - 36.0 g/dL 34.3    RED CELL DISTRIBUTION WIDTH      11.5 - 14.5 % 11.9    Platelets      150 - 450 x10*3/uL 204        Component      Latest Ref Rng 7/26/2025 7/27/2025   Albumin      3.4 - 5.0 g/dL 4.3  4.0    Alkaline Phosphatase      33 - 136 U/L 107  104    Total Protein      6.4 - 8.2 g/dL 7.1  6.3 (L)    AST      9 - 39 U/L 14  13    Bilirubin Total      0.0 - 1.2 mg/dL 0.5  0.5    ALT      7 - 45 U/L 14  12    Protime      9.8 - 12.4 seconds 11.8     INR      0.9 - 1.1  1.1     LIPASE      9 - 82 U/L 22        Cr 0.6    CT A/P: Double duct sign, with the CBD 10 mm, and the pancreatic duct 5-6 mm in the head of the pancreas and     Assessment/Plan     64-year-old female with biliary colic over the past 3 weeks, now with double duct sign on her CT.  The differential diagnosis includes gallstones with a CBD stone at the ampulla, or a pancreatic cancer.  Her LFTs are normal.  Of note, she has had a Joana-en-Y gastric bypass.  An ERCP would be challenging.    -Agree with MRCP  - If we cannot get the MRCP today, we will do a stat ultrasound of the right upper quadrant  -she will likely need a gen surgery consult eventually      Shiv Quiñones MD         [1]   Family History  Problem Relation Name Age of Onset    Brain Aneurysm Mother Mom     Coronary artery disease Mother Mom     Hypertension Mother Mom     Arthritis Mother Mom     Coronary artery disease Father Dad     Diabetes Father Dad     Hypertension Father Dad     Stroke Father Dad     Gout Brother

## 2025-07-27 NOTE — H&P
History Of Present Illness  Bridget Hannon is a 64 y.o. female presenting with abdominal pain for 1 week.  She described intermittent abdominal pain mainly in the epigastric area and left-sided, colicky in nature, comes and goes usually last about 1 hour, associated with nausea, worse after meals.  She started to feel unwell, and the pain is more frequent over the last few days.  She denied vomiting, diarrhea, fever, or dark stools.  She denies abnormal alcohol use, and denies illicit drug use.  She is former smoker.    ER course: Patient was awake, alert, oriented, in no acute distress.  Her blood pressure was elevated at 195/93.  She was afebrile.  Her labs were generally unremarkable specifically no leukocytosis, normal lipase, normal bilirubin and liver enzymes, and normal electrolytes.  CT abdomen/pelvis obtained showed intrahepatic and extrahepatic biliary dilatation as well as dilated main pancreatic duct.  MRCP was recommended.  Patient was given morphine and Zofran in the ER that helped but did not resolve the pain completely.  Patient placed in observation for MRCP.    Past Medical History  She has a past medical history of Arthritis, Depression, Diabetes mellitus (Multi), Fever, unspecified (03/21/2017), Headache, Hypertension (2000), Low back pain (2017), Osteomyelitis, unspecified (05/25/2022), Personal history of (healed) traumatic fracture (02/23/2022), Personal history of Methicillin resistant Staphylococcus aureus infection (09/18/2019), Personal history of other diseases of the circulatory system, Personal history of other diseases of the nervous system and sense organs, Personal history of other endocrine, nutritional and metabolic disease, Personal history of other mental and behavioral disorders, Personal history of other specified conditions (09/01/2017), Rotator cuff syndrome (2017), Tobacco use (07/26/2022), and Trigger finger (2024).    She has no past medical history of Personal  history of irradiation.    Surgical History  She has a past surgical history that includes Other surgical history (09/18/2020); Other surgical history (12/11/2021); Other surgical history (08/12/2020); Other surgical history (08/12/2020); Carpal tunnel release (2023); Plantar fascia release; and Hysterectomy (1998).     Social History  She reports that she has quit smoking. Her smoking use included cigarettes. She does not have any smokeless tobacco history on file. She reports that she does not currently use alcohol. She reports that she does not use drugs.    Family History  Family History[1]     Allergies  Phentermine    Review of Systems  10/10 points review of system were conducted, negative except as above    Physical Exam  Constitutional:       Appearance: Normal appearance. She is not ill-appearing.   HENT:      Head: Normocephalic and atraumatic.      Nose: Nose normal.      Mouth/Throat:      Mouth: Mucous membranes are moist.     Eyes:      General: No scleral icterus.     Extraocular Movements: Extraocular movements intact.      Conjunctiva/sclera: Conjunctivae normal.      Pupils: Pupils are equal, round, and reactive to light.       Cardiovascular:      Rate and Rhythm: Normal rate and regular rhythm.      Pulses: Normal pulses.      Heart sounds: No murmur heard.  Pulmonary:      Effort: No respiratory distress.      Breath sounds: No stridor. No wheezing, rhonchi or rales.   Chest:      Chest wall: No tenderness.   Abdominal:      General: There is no distension.      Palpations: There is no mass.      Tenderness: There is abdominal tenderness. There is guarding. There is no right CVA tenderness, left CVA tenderness or rebound.      Hernia: No hernia is present.     Musculoskeletal:         General: No swelling or tenderness. Normal range of motion.      Cervical back: Normal range of motion and neck supple. No rigidity or tenderness.      Right lower leg: No edema.      Left lower leg: No edema.  "    Skin:     General: Skin is warm and dry.      Findings: No lesion or rash.     Neurological:      General: No focal deficit present.      Mental Status: She is alert and oriented to person, place, and time. Mental status is at baseline.     Psychiatric:         Mood and Affect: Mood normal.         Behavior: Behavior normal.          Last Recorded Vitals  Blood pressure 174/82, pulse 73, temperature 36.5 °C (97.7 °F), resp. rate 17, height 1.6 m (5' 3\"), weight 71.2 kg (157 lb), SpO2 91%.    Relevant Results  Scheduled medications  Scheduled Medications[2]  Continuous medications  Continuous Medications[3]  PRN medications  PRN Medications[4]     Results for orders placed or performed during the hospital encounter of 07/26/25 (from the past 24 hours)   Comprehensive metabolic panel   Result Value Ref Range    Glucose 111 (H) 74 - 99 mg/dL    Sodium 139 136 - 145 mmol/L    Potassium 3.8 3.5 - 5.3 mmol/L    Chloride 107 98 - 107 mmol/L    Bicarbonate 25 21 - 32 mmol/L    Anion Gap 11 10 - 20 mmol/L    Urea Nitrogen 11 6 - 23 mg/dL    Creatinine 0.55 0.50 - 1.05 mg/dL    eGFR >90 >60 mL/min/1.73m*2    Calcium 9.4 8.6 - 10.3 mg/dL    Albumin 4.3 3.4 - 5.0 g/dL    Alkaline Phosphatase 107 33 - 136 U/L    Total Protein 7.1 6.4 - 8.2 g/dL    AST 14 9 - 39 U/L    Bilirubin, Total 0.5 0.0 - 1.2 mg/dL    ALT 14 7 - 45 U/L   CBC and Auto Differential   Result Value Ref Range    WBC 8.2 4.4 - 11.3 x10*3/uL    nRBC 0.0 0.0 - 0.0 /100 WBCs    RBC 4.76 4.00 - 5.20 x10*6/uL    Hemoglobin 15.2 12.0 - 16.0 g/dL    Hematocrit 44.9 36.0 - 46.0 %    MCV 94 80 - 100 fL    MCH 31.9 26.0 - 34.0 pg    MCHC 33.9 32.0 - 36.0 g/dL    RDW 11.8 11.5 - 14.5 %    Platelets 237 150 - 450 x10*3/uL    Neutrophils % 61.1 40.0 - 80.0 %    Immature Granulocytes %, Automated 0.2 0.0 - 0.9 %    Lymphocytes % 27.6 13.0 - 44.0 %    Monocytes % 9.5 2.0 - 10.0 %    Eosinophils % 1.1 0.0 - 6.0 %    Basophils % 0.5 0.0 - 2.0 %    Neutrophils Absolute 5.02 " 1.20 - 7.70 x10*3/uL    Immature Granulocytes Absolute, Automated 0.02 0.00 - 0.70 x10*3/uL    Lymphocytes Absolute 2.27 1.20 - 4.80 x10*3/uL    Monocytes Absolute 0.78 0.10 - 1.00 x10*3/uL    Eosinophils Absolute 0.09 0.00 - 0.70 x10*3/uL    Basophils Absolute 0.04 0.00 - 0.10 x10*3/uL   Lipase   Result Value Ref Range    Lipase 22 9 - 82 U/L   Magnesium   Result Value Ref Range    Magnesium 2.22 1.60 - 2.40 mg/dL   Troponin I, High Sensitivity, Initial   Result Value Ref Range    Troponin I, High Sensitivity 4 0 - 13 ng/L   Protime-INR   Result Value Ref Range    Protime 11.8 9.8 - 12.4 seconds    INR 1.1 0.9 - 1.1   aPTT   Result Value Ref Range    aPTT 34 26 - 36 seconds   Troponin, High Sensitivity, 1 Hour   Result Value Ref Range    Troponin I, High Sensitivity 5 0 - 13 ng/L   Urinalysis with Reflex Microscopic   Result Value Ref Range    Color, Urine Light-Yellow Light-Yellow, Yellow, Dark-Yellow    Appearance, Urine Clear Clear    Specific Gravity, Urine 1.013 1.005 - 1.035    pH, Urine 6.0 5.0, 5.5, 6.0, 6.5, 7.0, 7.5, 8.0    Protein, Urine NEGATIVE NEGATIVE, 10 (TRACE), 20 (TRACE) mg/dL    Glucose, Urine Normal Normal mg/dL    Blood, Urine NEGATIVE NEGATIVE mg/dL    Ketones, Urine NEGATIVE NEGATIVE mg/dL    Bilirubin, Urine NEGATIVE NEGATIVE mg/dL    Urobilinogen, Urine Normal Normal mg/dL    Nitrite, Urine NEGATIVE NEGATIVE    Leukocyte Esterase, Urine 25 Louise/uL (A) NEGATIVE   Microscopic Only, Urine   Result Value Ref Range    WBC, Urine 1-5 1-5, NONE /HPF    RBC, Urine 3-5 NONE, 1-2, 3-5 /HPF    Squamous Epithelial Cells, Urine 1-9 (SPARSE) Reference range not established. /HPF    Bacteria, Urine 4+ (A) NONE SEEN /HPF    Mucus, Urine FEW Reference range not established. /LPF         CT abdomen pelvis w IV contrast  Result Date: 7/26/2025  Interpreted By:  Darnell Cantu, STUDY: CT ABDOMEN PELVIS W IV CONTRAST;  7/26/2025 4:02 pm   INDICATION: Signs/Symptoms:abdominal pain diffuse + diarrhea.    COMPARISON: CT abdomen pelvis 05/31/2021   ACCESSION NUMBER(S): [id number]   ORDERING CLINICIAN: FÉLIX JANE   TECHNIQUE: CT of the abdomen and pelvis was performed.  Standard contiguous axial images were obtained at 3 mm slice thickness through the abdomen and pelvis. Coronal and sagittal reconstructions at 3 mm slice thickness were performed.   of were administered intravenously without immediate complication.   FINDINGS: LOWER CHEST: No consolidation or effusion.   ABDOMEN:   LIVER: Unremarkable   BILE DUCTS: Trace intrahepatic biliary dilatation. The common bile duct is prominent, measuring up to 10 mm in diameter within the distal common bile duct near the ampulla.   GALLBLADDER: Distended without calcified gallstone detected.   PANCREAS: There is abnormal dilatation of the proximal main pancreatic duct up to 5-6 mm within the region of the pancreatic head and proximal body.   SPLEEN: Unremarkable.   ADRENAL GLANDS: Unremarkable.   KIDNEYS AND URETERS: The bilateral kidneys enhance symmetrically. No hydroureteronephrosis or nephroureterolithiasis is identified.   PELVIS:   BLADDER: Unremarkable.   REPRODUCTIVE ORGANS: No pelvic mass seen.   BOWEL: Postsurgical changes of the stomach and upper gastrointestinal tract which appear similar to prior comparison exam no pathologic distention of large or small bowel.   VESSELS: Atherosclerotic calcifications noted about the aortoiliac axis without evidence of aneurysm.   PERITONEUM/RETROPERITONEUM/LYMPH NODES: No ascites or free air, no fluid collection.  No enlarged mesenteric lymph nodes.   BONES AND ABDOMINAL WALL: No acute osseous abnormality. Multilevel spondylosis throughout the imaged spine.       1.  Intrahepatic and extrahepatic biliary dilatation as described above with concomitant prominence of the main pancreatic duct proximally. Correlate with liver function tests and consider MRCP for further assessment as clinically warranted.     Signed by:  Darnell Cantu 7/26/2025 4:40 PM Dictation workstation:   [id number]         Assessment/Plan   Assessment & Plan  Biliary pain    Dilated bile duct    Acute UTI (urinary tract infection)    Type 2 diabetes mellitus without complication, without long-term current use of insulin    Benign essential hypertension      Patient presented with abdominal pain mostly on the epigastric and left-sided.  - Comes and goes.  - No fever.  - Has evidence of urinary tract infection.  - Will start ceftriaxone for UTI.  - Has dilated intrahepatic and extrahepatic as well as pancreatic main duct.  - Will obtain MRCP.  - Keep n.p.o. after midnight for MRCP.  - Pantoprazole for GI prophylaxis.  - GI consult.  - Patient has normal liver enzyme, lipase and bilirubin.  - Optimize pain control.  - Blood pressure control.  - SCD and Lovenox for DVT prophylax    Patel Galvan MD         [1]   Family History  Problem Relation Name Age of Onset    Brain Aneurysm Mother Mom     Coronary artery disease Mother Mom     Hypertension Mother Mom     Arthritis Mother Mom     Coronary artery disease Father Dad     Diabetes Father Dad     Hypertension Father Dad     Stroke Father Dad     Gout Brother     [2] cefTRIAXone, 1 g, intravenous, q24h  enoxaparin, 40 mg, subcutaneous, q24h  gabapentin, 800 mg, oral, TID  losartan, 50 mg, oral, Daily  [START ON 7/27/2025] pantoprazole, 40 mg, oral, Daily before breakfast   Or  [START ON 7/27/2025] pantoprazole, 40 mg, intravenous, Daily before breakfast  pantoprazole, 40 mg, intravenous, Daily  rOPINIRole, 1 mg, oral, TID  [START ON 7/27/2025] sertraline, 100 mg, oral, Daily     [3]    [4] PRN medications: acetaminophen **OR** acetaminophen **OR** acetaminophen, albuterol, diphenhydrAMINE, melatonin, ondansetron **OR** ondansetron, polyethylene glycol

## 2025-07-27 NOTE — PROGRESS NOTES
Bridget Hannon is a 64 y.o. female on day 0 of admission presenting with Biliary pain.      Subjective     Seen and examined   Clinically stable   Still c/o abdominal pain   Mild nausea   NO fever        Objective     Last Recorded Vitals  /76   Pulse 56   Temp 36.3 °C (97.3 °F)   Resp 17   Wt 71 kg (156 lb 8.4 oz)   SpO2 (!) 89%   Intake/Output last 3 Shifts:    Intake/Output Summary (Last 24 hours) at 7/27/2025 0958  Last data filed at 7/27/2025 0005  Gross per 24 hour   Intake 50 ml   Output --   Net 50 ml       Admission Weight  Weight: 71.2 kg (157 lb) (07/26/25 1359)    Daily Weight  07/26/25 : 71 kg (156 lb 8.4 oz)    Image Results  CT abdomen pelvis w IV contrast  Narrative: Interpreted By:  Darnell Cantu,   STUDY:  CT ABDOMEN PELVIS W IV CONTRAST;  7/26/2025 4:02 pm      INDICATION:  Signs/Symptoms:abdominal pain diffuse + diarrhea.      COMPARISON:  CT abdomen pelvis 05/31/2021      ACCESSION NUMBER(S):  [id number]      ORDERING CLINICIAN:  FÉLIX JANE      TECHNIQUE:  CT of the abdomen and pelvis was performed.  Standard contiguous  axial images were obtained at 3 mm slice thickness through the  abdomen and pelvis. Coronal and sagittal reconstructions at 3 mm  slice thickness were performed.      of were administered intravenously without immediate complication.      FINDINGS:  LOWER CHEST:  No consolidation or effusion.      ABDOMEN:      LIVER:  Unremarkable      BILE DUCTS:  Trace intrahepatic biliary dilatation. The common bile duct is  prominent, measuring up to 10 mm in diameter within the distal common  bile duct near the ampulla.      GALLBLADDER:  Distended without calcified gallstone detected.      PANCREAS:  There is abnormal dilatation of the proximal main pancreatic duct up  to 5-6 mm within the region of the pancreatic head and proximal body.      SPLEEN:  Unremarkable.      ADRENAL GLANDS:  Unremarkable.      KIDNEYS AND URETERS:  The bilateral kidneys enhance  symmetrically. No hydroureteronephrosis  or nephroureterolithiasis is identified.      PELVIS:      BLADDER:  Unremarkable.      REPRODUCTIVE ORGANS:  No pelvic mass seen.      BOWEL:  Postsurgical changes of the stomach and upper gastrointestinal tract  which appear similar to prior comparison exam no pathologic  distention of large or small bowel.      VESSELS:  Atherosclerotic calcifications noted about the aortoiliac axis  without evidence of aneurysm.      PERITONEUM/RETROPERITONEUM/LYMPH NODES:  No ascites or free air, no fluid collection.  No enlarged mesenteric  lymph nodes.      BONES AND ABDOMINAL WALL:  No acute osseous abnormality. Multilevel spondylosis throughout the  imaged spine.      Impression: 1.  Intrahepatic and extrahepatic biliary dilatation as described  above with concomitant prominence of the main pancreatic duct  proximally. Correlate with liver function tests and consider MRCP for  further assessment as clinically warranted.          Signed by: Darnell Cantu 7/26/2025 4:40 PM  Dictation workstation:   [id number]      Physical Exam  Vitals and nursing note reviewed.   HENT:      Head: Normocephalic and atraumatic.      Mouth/Throat:      Mouth: Mucous membranes are moist.     Eyes:      Extraocular Movements: Extraocular movements intact.      Pupils: Pupils are equal, round, and reactive to light.       Cardiovascular:      Rate and Rhythm: Normal rate and regular rhythm.      Pulses: Normal pulses.      Heart sounds: Normal heart sounds.   Pulmonary:      Effort: Pulmonary effort is normal.      Breath sounds: Normal breath sounds.   Abdominal:      General: Abdomen is flat.      Palpations: Abdomen is soft.     Musculoskeletal:      Cervical back: Normal range of motion and neck supple.     Skin:     General: Skin is warm.     Neurological:      General: No focal deficit present.      Mental Status: She is alert and oriented to person, place, and time.     Psychiatric:         Mood  and Affect: Mood normal.         Behavior: Behavior normal.             Narrative & Impression   Interpreted By:  Darnell Cantu,   STUDY:  CT ABDOMEN PELVIS W IV CONTRAST;  7/26/2025 4:02 pm      INDICATION:  Signs/Symptoms:abdominal pain diffuse + diarrhea.      COMPARISON:  CT abdomen pelvis 05/31/2021      ACCESSION NUMBER(S):  [id number]      ORDERING CLINICIAN:  FÉLIX JANE      TECHNIQUE:  CT of the abdomen and pelvis was performed.  Standard contiguous  axial images were obtained at 3 mm slice thickness through the  abdomen and pelvis. Coronal and sagittal reconstructions at 3 mm  slice thickness were performed.      of were administered intravenously without immediate complication.      FINDINGS:  LOWER CHEST:  No consolidation or effusion.      ABDOMEN:      LIVER:  Unremarkable      BILE DUCTS:  Trace intrahepatic biliary dilatation. The common bile duct is  prominent, measuring up to 10 mm in diameter within the distal common  bile duct near the ampulla.      GALLBLADDER:  Distended without calcified gallstone detected.      PANCREAS:  There is abnormal dilatation of the proximal main pancreatic duct up  to 5-6 mm within the region of the pancreatic head and proximal body.      SPLEEN:  Unremarkable.      ADRENAL GLANDS:  Unremarkable.      KIDNEYS AND URETERS:  The bilateral kidneys enhance symmetrically. No hydroureteronephrosis  or nephroureterolithiasis is identified.      PELVIS:      BLADDER:  Unremarkable.      REPRODUCTIVE ORGANS:  No pelvic mass seen.      BOWEL:  Postsurgical changes of the stomach and upper gastrointestinal tract  which appear similar to prior comparison exam no pathologic  distention of large or small bowel.      VESSELS:  Atherosclerotic calcifications noted about the aortoiliac axis  without evidence of aneurysm.      PERITONEUM/RETROPERITONEUM/LYMPH NODES:  No ascites or free air, no fluid collection.  No enlarged mesenteric  lymph nodes.      BONES AND ABDOMINAL  WALL:  No acute osseous abnormality. Multilevel spondylosis throughout the  imaged spine.      IMPRESSION:  1.  Intrahepatic and extrahepatic biliary dilatation as described  above with concomitant prominence of the main pancreatic duct  proximally. Correlate with liver function tests and consider MRCP for  further assessment as clinically warranted.          Signed by: Darnell Cantu 7/26/2025 4:40 PM         Assessment & Plan  Biliary pain    Dilated bile duct    Acute UTI (urinary tract infection)    Type 2 diabetes mellitus without complication, without long-term current use of insulin    Benign essential hypertension    Plan     Seen and examined   Clinically stable   C/w abdominal pain   Lfts are normal   No fever   No leukocytosis     CT abdomen and pelvis   ntrahepatic and extrahepatic biliary dilatation as described  above with concomitant prominence of the main pancreatic duct  proximally. Correlate with liver function tests and consider MRCP for  further assessment as clinically warranted.    MRCP abdomen     GI was consulted     Toradol IV PRN for pain                    Shakila Joya MD

## 2025-07-28 ENCOUNTER — APPOINTMENT (OUTPATIENT)
Dept: RADIOLOGY | Facility: HOSPITAL | Age: 65
DRG: 690 | End: 2025-07-28
Payer: MEDICARE

## 2025-07-28 LAB
HOLD SPECIMEN: NORMAL
HOLD SPECIMEN: NORMAL

## 2025-07-28 PROCEDURE — 99233 SBSQ HOSP IP/OBS HIGH 50: CPT | Performed by: INTERNAL MEDICINE

## 2025-07-28 PROCEDURE — 2500000001 HC RX 250 WO HCPCS SELF ADMINISTERED DRUGS (ALT 637 FOR MEDICARE OP): Performed by: INTERNAL MEDICINE

## 2025-07-28 PROCEDURE — 2500000004 HC RX 250 GENERAL PHARMACY W/ HCPCS (ALT 636 FOR OP/ED): Performed by: INTERNAL MEDICINE

## 2025-07-28 PROCEDURE — 76705 ECHO EXAM OF ABDOMEN: CPT | Performed by: RADIOLOGY

## 2025-07-28 PROCEDURE — 2500000002 HC RX 250 W HCPCS SELF ADMINISTERED DRUGS (ALT 637 FOR MEDICARE OP, ALT 636 FOR OP/ED): Performed by: INTERNAL MEDICINE

## 2025-07-28 PROCEDURE — 76705 ECHO EXAM OF ABDOMEN: CPT

## 2025-07-28 PROCEDURE — 1210000001 HC SEMI-PRIVATE ROOM DAILY

## 2025-07-28 PROCEDURE — 2500000004 HC RX 250 GENERAL PHARMACY W/ HCPCS (ALT 636 FOR OP/ED): Performed by: EMERGENCY MEDICINE

## 2025-07-28 RX ORDER — METHYLPREDNISOLONE 4 MG/1
16 TABLET ORAL ONCE
Status: DISCONTINUED | OUTPATIENT
Start: 2025-07-30 | End: 2025-07-29 | Stop reason: HOSPADM

## 2025-07-28 RX ORDER — LORAZEPAM 0.5 MG/1
0.5 TABLET ORAL ONCE AS NEEDED
Status: COMPLETED | OUTPATIENT
Start: 2025-07-28 | End: 2025-07-29

## 2025-07-28 RX ORDER — METHYLPREDNISOLONE 4 MG/1
24 TABLET ORAL ONCE
Status: ACTIVE | OUTPATIENT
Start: 2025-07-28 | End: 2025-07-29

## 2025-07-28 RX ORDER — METHYLPREDNISOLONE 4 MG/1
12 TABLET ORAL ONCE
Status: DISCONTINUED | OUTPATIENT
Start: 2025-07-31 | End: 2025-07-29 | Stop reason: HOSPADM

## 2025-07-28 RX ORDER — METHYLPREDNISOLONE 4 MG/1
8 TABLET ORAL ONCE
Status: DISCONTINUED | OUTPATIENT
Start: 2025-08-01 | End: 2025-07-29 | Stop reason: HOSPADM

## 2025-07-28 RX ORDER — METHYLPREDNISOLONE 4 MG/1
4 TABLET ORAL ONCE
Status: DISCONTINUED | OUTPATIENT
Start: 2025-08-02 | End: 2025-07-29 | Stop reason: HOSPADM

## 2025-07-28 RX ORDER — METHOCARBAMOL 500 MG/1
500 TABLET, FILM COATED ORAL EVERY 8 HOURS PRN
Status: DISCONTINUED | OUTPATIENT
Start: 2025-07-28 | End: 2025-07-29 | Stop reason: HOSPADM

## 2025-07-28 RX ORDER — METHYLPREDNISOLONE 4 MG/1
20 TABLET ORAL ONCE
Status: COMPLETED | OUTPATIENT
Start: 2025-07-29 | End: 2025-07-29

## 2025-07-28 RX ORDER — DULOXETIN HYDROCHLORIDE 30 MG/1
60 CAPSULE, DELAYED RELEASE ORAL DAILY
Status: DISCONTINUED | OUTPATIENT
Start: 2025-07-28 | End: 2025-07-29 | Stop reason: HOSPADM

## 2025-07-28 RX ADMIN — GABAPENTIN 800 MG: 400 CAPSULE ORAL at 14:30

## 2025-07-28 RX ADMIN — PANTOPRAZOLE SODIUM 40 MG: 40 TABLET, DELAYED RELEASE ORAL at 06:01

## 2025-07-28 RX ADMIN — OXYCODONE HYDROCHLORIDE AND ACETAMINOPHEN 1 TABLET: 5; 325 TABLET ORAL at 13:40

## 2025-07-28 RX ADMIN — LOSARTAN POTASSIUM 50 MG: 50 TABLET, FILM COATED ORAL at 08:21

## 2025-07-28 RX ADMIN — CEFTRIAXONE 1 G: 1 INJECTION, SOLUTION INTRAVENOUS at 22:09

## 2025-07-28 RX ADMIN — SERTRALINE HYDROCHLORIDE 100 MG: 50 TABLET, FILM COATED ORAL at 08:22

## 2025-07-28 RX ADMIN — OXYCODONE HYDROCHLORIDE AND ACETAMINOPHEN 1 TABLET: 5; 325 TABLET ORAL at 20:10

## 2025-07-28 RX ADMIN — DULOXETINE 60 MG: 30 CAPSULE, DELAYED RELEASE ORAL at 11:52

## 2025-07-28 RX ADMIN — PANTOPRAZOLE SODIUM 40 MG: 40 INJECTION, POWDER, FOR SOLUTION INTRAVENOUS at 08:21

## 2025-07-28 RX ADMIN — GABAPENTIN 800 MG: 400 CAPSULE ORAL at 20:10

## 2025-07-28 RX ADMIN — ENOXAPARIN SODIUM 40 MG: 100 INJECTION SUBCUTANEOUS at 20:10

## 2025-07-28 RX ADMIN — GABAPENTIN 800 MG: 400 CAPSULE ORAL at 08:21

## 2025-07-28 RX ADMIN — ROPINIROLE 1 MG: 1 TABLET, FILM COATED ORAL at 06:01

## 2025-07-28 RX ADMIN — OXYCODONE HYDROCHLORIDE AND ACETAMINOPHEN 1 TABLET: 5; 325 TABLET ORAL at 06:01

## 2025-07-28 RX ADMIN — ROPINIROLE 1 MG: 1 TABLET, FILM COATED ORAL at 22:05

## 2025-07-28 ASSESSMENT — PAIN - FUNCTIONAL ASSESSMENT
PAIN_FUNCTIONAL_ASSESSMENT: 0-10

## 2025-07-28 ASSESSMENT — COGNITIVE AND FUNCTIONAL STATUS - GENERAL
MOBILITY SCORE: 24
DAILY ACTIVITIY SCORE: 24
MOBILITY SCORE: 24
DAILY ACTIVITIY SCORE: 24

## 2025-07-28 ASSESSMENT — PAIN DESCRIPTION - DESCRIPTORS
DESCRIPTORS: SHARP
DESCRIPTORS: ACHING
DESCRIPTORS: DISCOMFORT

## 2025-07-28 ASSESSMENT — PAIN SCALES - GENERAL
PAINLEVEL_OUTOF10: 5 - MODERATE PAIN
PAINLEVEL_OUTOF10: 9
PAINLEVEL_OUTOF10: 7
PAINLEVEL_OUTOF10: 4
PAINLEVEL_OUTOF10: 4

## 2025-07-28 ASSESSMENT — PAIN DESCRIPTION - LOCATION
LOCATION: BACK
LOCATION: BACK

## 2025-07-28 ASSESSMENT — PAIN DESCRIPTION - ORIENTATION: ORIENTATION: LOWER

## 2025-07-28 NOTE — PROGRESS NOTES
Bridget Hannon is a 64 y.o. female on day 1 of admission presenting with Biliary pain.      Subjective   The patient is seen and evaluated this morning.  She states she feels a bit better.  Still has epigastric tenderness.  Denies any nausea vomiting fever chills denies any diarrhea at this time.  Currently waiting for MRCP results       Objective     Last Recorded Vitals  /68   Pulse 69   Temp 36.3 °C (97.3 °F)   Resp 17   Wt 71 kg (156 lb 8.4 oz)   SpO2 96%   Intake/Output last 3 Shifts:    Intake/Output Summary (Last 24 hours) at 7/28/2025 1231  Last data filed at 7/27/2025 2230  Gross per 24 hour   Intake 50 ml   Output --   Net 50 ml       Admission Weight  Weight: 71.2 kg (157 lb) (07/26/25 1359)    Daily Weight  07/26/25 : 71 kg (156 lb 8.4 oz)    Image Results  CT abdomen pelvis w IV contrast  Narrative: Interpreted By:  Darnell Cantu,   STUDY:  CT ABDOMEN PELVIS W IV CONTRAST;  7/26/2025 4:02 pm      INDICATION:  Signs/Symptoms:abdominal pain diffuse + diarrhea.      COMPARISON:  CT abdomen pelvis 05/31/2021      ACCESSION NUMBER(S):  [id number]      ORDERING CLINICIAN:  FÉLIX JANE      TECHNIQUE:  CT of the abdomen and pelvis was performed.  Standard contiguous  axial images were obtained at 3 mm slice thickness through the  abdomen and pelvis. Coronal and sagittal reconstructions at 3 mm  slice thickness were performed.      of were administered intravenously without immediate complication.      FINDINGS:  LOWER CHEST:  No consolidation or effusion.      ABDOMEN:      LIVER:  Unremarkable      BILE DUCTS:  Trace intrahepatic biliary dilatation. The common bile duct is  prominent, measuring up to 10 mm in diameter within the distal common  bile duct near the ampulla.      GALLBLADDER:  Distended without calcified gallstone detected.      PANCREAS:  There is abnormal dilatation of the proximal main pancreatic duct up  to 5-6 mm within the region of the pancreatic head and  proximal body.      SPLEEN:  Unremarkable.      ADRENAL GLANDS:  Unremarkable.      KIDNEYS AND URETERS:  The bilateral kidneys enhance symmetrically. No hydroureteronephrosis  or nephroureterolithiasis is identified.      PELVIS:      BLADDER:  Unremarkable.      REPRODUCTIVE ORGANS:  No pelvic mass seen.      BOWEL:  Postsurgical changes of the stomach and upper gastrointestinal tract  which appear similar to prior comparison exam no pathologic  distention of large or small bowel.      VESSELS:  Atherosclerotic calcifications noted about the aortoiliac axis  without evidence of aneurysm.      PERITONEUM/RETROPERITONEUM/LYMPH NODES:  No ascites or free air, no fluid collection.  No enlarged mesenteric  lymph nodes.      BONES AND ABDOMINAL WALL:  No acute osseous abnormality. Multilevel spondylosis throughout the  imaged spine.      Impression: 1.  Intrahepatic and extrahepatic biliary dilatation as described  above with concomitant prominence of the main pancreatic duct  proximally. Correlate with liver function tests and consider MRCP for  further assessment as clinically warranted.          Signed by: Darnell Cantu 7/26/2025 4:40 PM  Dictation workstation:   [id number]      Physical Exam  HENT:      Head: Normocephalic and atraumatic.      Nose: Nose normal.      Mouth/Throat:      Mouth: Mucous membranes are dry.     Eyes:      Extraocular Movements: Extraocular movements intact.      Conjunctiva/sclera: Conjunctivae normal.       Cardiovascular:      Rate and Rhythm: Normal rate and regular rhythm.      Pulses: Normal pulses.      Heart sounds: Normal heart sounds.   Pulmonary:      Effort: Pulmonary effort is normal.      Breath sounds: Normal breath sounds.   Abdominal:      General: Bowel sounds are normal.      Palpations: Abdomen is soft.      Tenderness: There is abdominal tenderness.     Musculoskeletal:         General: Normal range of motion.      Cervical back: Normal range of motion and neck  supple.     Skin:     General: Skin is warm and dry.     Neurological:      Mental Status: She is alert. Mental status is at baseline.     Psychiatric:         Mood and Affect: Mood normal.         Relevant Results                    Bridget Hannon is a 64 y.o. female with past medical history of diabetes mellitus, hypertension, chronic back pain, hemochromatosis, RLS, chronic opioid use, depression and anxiety, Joana-en-Y gastric bypass, presenting with abdominal pain for for a few weeks. CT abdomen/pelvis obtained showed intrahepatic and extrahepatic biliary dilatation as well as dilated main pancreatic duct. MRCP was ordered.  GI was consulted.        Assessment & Plan  Biliary pain    Dilated bile duct    Acute UTI (urinary tract infection)    Type 2 diabetes mellitus without complication, without long-term current use of insulin    Benign essential hypertension    Abdominal pain  -CT abdomen/pelvis obtained showed intrahepatic and extrahepatic biliary dilatation as well as dilated main pancreatic duct. MRCP was ordered.   - GI consulted  - MRCP results are currently pending.  - Per GI the patient may need surgical evaluation.    Chronic back pain  - The patient on chronic opioids follows with pain management in outpatient settings requesting Dr. Larkin on consult.    Hypertension   Hyperlipidemia  Anxiety and depression   -continue with appropriate home medications.    Possible UTI  - Urine cultures are negative.  To complete course of ceftriaxone.  No need for antibiotics in outpatient settings           Matthew Carroll MD

## 2025-07-28 NOTE — CONSULTS
Consults    Reason For Consult  Back pain with bilateral lower extremities radiculopathy    History Of Present Illness  Bridget Hannon is a 64 y.o. female presenting with abdominal pain.  However the patient stated that she has back pain radiating to both of her lower extremities together with tingling and numbness sensation.  The patient stated that it is impacting her balance as well.  Patient denied any change in her urinary or bowel symptoms.  The patient denied any fever or chills..     Past Medical History  She has a past medical history of Arthritis, Depression, Diabetes mellitus (Multi), Fever, unspecified (03/21/2017), Headache, Hypertension (2000), Low back pain (2017), Osteomyelitis, unspecified (05/25/2022), Personal history of (healed) traumatic fracture (02/23/2022), Personal history of Methicillin resistant Staphylococcus aureus infection (09/18/2019), Personal history of other diseases of the circulatory system, Personal history of other diseases of the nervous system and sense organs, Personal history of other endocrine, nutritional and metabolic disease, Personal history of other mental and behavioral disorders, Personal history of other specified conditions (09/01/2017), Rotator cuff syndrome (2017), Tobacco use (07/26/2022), and Trigger finger (2024).    She has no past medical history of Personal history of irradiation.    Surgical History  She has a past surgical history that includes Other surgical history (09/18/2020); Other surgical history (12/11/2021); Other surgical history (08/12/2020); Other surgical history (08/12/2020); Carpal tunnel release (2023); Plantar fascia release; and Hysterectomy (1998).     Social History  She reports that she has quit smoking. Her smoking use included cigarettes. She does not have any smokeless tobacco history on file. She reports current alcohol use. She reports that she does not use drugs.    Family History  Family History[1]      Allergies  Phentermine    Review of Systems  10 system symptoms have been inquired about and what was positive was placed in history of present illness     Physical Exam  Patient is alert, conscious and cooperative  Lungs: Bilateral breath sounds  Heart: S1-S2 no S3  Abdominal exam: Unremarkable for signs of peritonitis  Neurological examination: Cranial nerves II through XII within normal limit.  Motor and sensory of upper extremities within normal limits.        Last Recorded Vitals  /89 (Patient Position: Sitting)   Pulse 69   Temp 36.5 °C (97.7 °F)   Resp 17   Wt 71 kg (156 lb 8.4 oz)   SpO2 94%     Relevant Results  Narrative & Impression   Interpreted By:  Darnell Cantu,   STUDY:  CT ABDOMEN PELVIS W IV CONTRAST;  7/26/2025 4:02 pm      INDICATION:  Signs/Symptoms:abdominal pain diffuse + diarrhea.      COMPARISON:  CT abdomen pelvis 05/31/2021      ACCESSION NUMBER(S):  [id number]      ORDERING CLINICIAN:  FÉLIX JANE      TECHNIQUE:  CT of the abdomen and pelvis was performed.  Standard contiguous  axial images were obtained at 3 mm slice thickness through the  abdomen and pelvis. Coronal and sagittal reconstructions at 3 mm  slice thickness were performed.      of were administered intravenously without immediate complication.      FINDINGS:  LOWER CHEST:  No consolidation or effusion.      ABDOMEN:      LIVER:  Unremarkable      BILE DUCTS:  Trace intrahepatic biliary dilatation. The common bile duct is  prominent, measuring up to 10 mm in diameter within the distal common  bile duct near the ampulla.      GALLBLADDER:  Distended without calcified gallstone detected.      PANCREAS:  There is abnormal dilatation of the proximal main pancreatic duct up  to 5-6 mm within the region of the pancreatic head and proximal body.      SPLEEN:  Unremarkable.      ADRENAL GLANDS:  Unremarkable.      KIDNEYS AND URETERS:  The bilateral kidneys enhance symmetrically. No hydroureteronephrosis  or  nephroureterolithiasis is identified.      PELVIS:      BLADDER:  Unremarkable.      REPRODUCTIVE ORGANS:  No pelvic mass seen.      BOWEL:  Postsurgical changes of the stomach and upper gastrointestinal tract  which appear similar to prior comparison exam no pathologic  distention of large or small bowel.      VESSELS:  Atherosclerotic calcifications noted about the aortoiliac axis  without evidence of aneurysm.      PERITONEUM/RETROPERITONEUM/LYMPH NODES:  No ascites or free air, no fluid collection.  No enlarged mesenteric  lymph nodes.      BONES AND ABDOMINAL WALL:  No acute osseous abnormality. Multilevel spondylosis throughout the  imaged spine.      IMPRESSION:  1.  Intrahepatic and extrahepatic biliary dilatation as described  above with concomitant prominence of the main pancreatic duct  proximally. Correlate with liver function tests and consider MRCP for  further assessment as clinically warranted.          Signed by: Darnell Cantu 7/26/2025 4:40 PM  Dictation workstation:   [id number]        Assessment/Plan   Lower back pain with bilateral lower extremity radiculopathies.  I would like to obtain lumbar spine MRI.  Additionally I would like to place the patient on Medrol Dosepak, gabapentin and methocarbamol.    Vaenssa Larkin MD             [1]   Family History  Problem Relation Name Age of Onset    Brain Aneurysm Mother Mom     Coronary artery disease Mother Mom     Hypertension Mother Mom     Arthritis Mother Mom     Coronary artery disease Father Dad     Diabetes Father Dad     Hypertension Father Dad     Stroke Father Dad     Gout Brother

## 2025-07-28 NOTE — PROGRESS NOTES
07/28/25 1120   Discharge Planning   Living Arrangements Alone   Support Systems Children;Family members;Friends/neighbors   Assistance Needed PTA - independent at baseline.   Type of Residence Private residence   Home or Post Acute Services None   Expected Discharge Disposition Home   Does the patient need discharge transport arranged? Yes   [facility] coordination needed? Yes   Intensity of Service   Intensity of Service 0-30 min     Presented for abd pain. Found with UTI and dilated pancreatic main duct and intra/extrahepatic. GI following, MRCP pending. Started on IV Ceftriaxone. HOME at [place].

## 2025-07-28 NOTE — CONSULTS
Department of Internal Medicine  Gastroenterology  Progress note      Subjective  GI is following for epigastric pain and biliary dilation    Today, she states her abdominal pain has improved but she continues to have intermittent abdominal pain.  She denies any nausea or vomiting.      Current Medication  Current Medications[1]    Past Medical History  Active Ambulatory Problems     Diagnosis Date Noted    Type 2 diabetes mellitus without complication, without long-term current use of insulin 09/06/2023    Depression 09/06/2023    RLS (restless legs syndrome) 09/06/2023    Other chronic pain 09/06/2023    Vitamin D deficiency 09/06/2023    Abnormal EKG 10/01/2023    Abnormal nuclear stress test 10/01/2023    Absolute anemia 03/15/2016    Arthropathy, traumatic, multiple joints 10/01/2023    Bariatric surgery status 10/01/2023    Benign essential hypertension 10/01/2023    Carpal tunnel syndrome of left wrist 10/01/2023    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 10/01/2023    Elbow deformity 10/01/2023    Excessive daytime sleepiness 10/01/2023    Hyperlipidemia 10/01/2023    Impingement syndrome of left shoulder 03/29/2017    Insomnia 10/01/2023    Migraine with aura and without status migrainosus, not intractable 10/01/2023    Joint stiffness 10/01/2023    Postoperative malabsorption ([facility]) 10/01/2023    S/P gastric bypass 10/01/2023    Sleep apnea 10/01/2023    Sleep disturbance 05/15/2017    Spondylosis of lumbar region without myelopathy or radiculopathy 02/17/2017    Chronic back pain 10/01/2023    Chronic left shoulder pain 11/30/2016    Chronic pain 10/01/2023    Chronic left-sided low back pain with left-sided sciatica 05/15/2017    De Quervain's tenosynovitis 11/15/2023    Screening for condition 12/07/2023    Drug therapy 12/07/2023    Vitamin D deficiency, unspecified 03/06/2024    Bilateral leg weakness 06/14/2024    Bilateral leg pain 06/14/2024    Other specified  "disorders of tendon, left hand 09/16/2024    Trigger finger, left middle finger 09/16/2024    Palpitation 03/12/2025    Hemochromatosis 03/12/2025    Post-traumatic osteoarthritis of multiple joints 04/22/2025     Resolved Ambulatory Problems     Diagnosis Date Noted    Chronic osteomyelitis of right radius with draining sinus 10/01/2023    Major depressive disorder, single episode, severe without psychotic features (Multi) 03/15/2016    Psychological factors affecting morbid obesity (Multi) 10/01/2023     Past Medical History:   Diagnosis Date    Arthritis     Diabetes mellitus (Multi)     Fever, unspecified 03/21/2017    Headache     Hypertension 2000    Low back pain 2017    Osteomyelitis, unspecified 05/25/2022    Personal history of (healed) traumatic fracture 02/23/2022    Personal history of Methicillin resistant Staphylococcus aureus infection 09/18/2019    Personal history of other diseases of the circulatory system     Personal history of other diseases of the nervous system and sense organs     Personal history of other endocrine, nutritional and metabolic disease     Personal history of other mental and behavioral disorders     Personal history of other specified conditions 09/01/2017    Rotator cuff syndrome 2017    Tobacco use 07/26/2022    Trigger finger 2024       PHYSICAL EXAM  VS: /68   Pulse 69   Temp 36.3 °C (97.3 °F)   Resp 17   Ht 1.6 m (5' 2.99\")   Wt 71 kg (156 lb 8.4 oz)   SpO2 96%   BMI 27.73 kg/m²  Body mass index is 27.73 kg/m².  Physical Exam  Constitutional:       General: She is not in acute distress.     Appearance: Normal appearance.   HENT:      Head: Normocephalic and atraumatic.      Mouth/Throat:      Mouth: Mucous membranes are moist.      Pharynx: Oropharynx is clear.     Eyes:      General: No scleral icterus.     Extraocular Movements: Extraocular movements intact.      Conjunctiva/sclera: Conjunctivae normal.      Pupils: Pupils are equal, round, and reactive to " light.       Cardiovascular:      Rate and Rhythm: Normal rate and regular rhythm.      Heart sounds: No murmur heard.  Pulmonary:      Effort: Pulmonary effort is normal.      Breath sounds: No wheezing or rhonchi.   Abdominal:      General: Bowel sounds are normal. There is no distension.      Palpations: Abdomen is soft. There is no mass.      Tenderness: There is no abdominal tenderness.      Hernia: No hernia is present.     Musculoskeletal:         General: No swelling or deformity.     Skin:     General: Skin is warm.      Coloration: Skin is not jaundiced.     Neurological:      General: No focal deficit present.      Mental Status: She is alert and oriented to person, place, and time.     Psychiatric:         Mood and Affect: Mood normal.          DATA  Recent blood work and relevant radiology studies were reviewed and discussed with the patient   Results from last 7 days   Lab Units 07/27/25  0555   WBC AUTO x10*3/uL 6.2   RBC AUTO x10*6/uL 4.37   HEMOGLOBIN g/dL 14.2   HEMATOCRIT % 41.4   MCV fL 95   MCHC g/dL 34.3   RDW % 11.9   PLATELETS AUTO x10*3/uL 204       Results from last 72 hours   Lab Units 07/27/25  0555   SODIUM mmol/L 142   POTASSIUM mmol/L 3.7   CHLORIDE mmol/L 107   CO2 mmol/L 28   BUN mg/dL 11   CREATININE mg/dL 0.62   CALCIUM mg/dL 8.7   PROTEIN TOTAL g/dL 6.3*   BILIRUBIN TOTAL mg/dL 0.5   ALK PHOS U/L 104   AST U/L 13   ALT U/L 12       Results from last 72 hours   Lab Units 07/26/25  1519   INR  1.1       Results from last 72 hours   Lab Units 07/26/25  1407   LIPASE U/L 22       RADIOLOGY REVIEW  MRCP pancreas 7/27/2025  Pending    ENDOSCOPIC REVIEW  NA    IMPRESSION/RECOMMENDATIONS  64-year-old female with biliary colic over the past 3 weeks, now with double duct sign on her CT.  The differential diagnosis includes gallstones with a CBD stone at the ampulla, or a pancreatic cancer.  Her LFTs are normal.  Of note, she has had a Joana-en-Y gastric bypass.  An ERCP would be  challenging.     -Agree with MRCP  -she will likely need a gen surgery consult eventually    Discussed with Dr Quiñones GI to follow    (Electronically signed byCharmaine Hoffmann PA-C on 7/28/2025 at 10:41 AM)    Inpatient consult to gastroenterology  Consult performed by: Charmaine Hoffmann PA-C  Consult ordered by: Patel Galvan MD             [1]   Current Facility-Administered Medications:     acetaminophen (Tylenol) tablet 650 mg, 650 mg, oral, q4h PRN, 650 mg at 07/27/25 0441 **OR** acetaminophen (Tylenol) oral liquid 650 mg, 650 mg, nasogastric tube, q4h PRN **OR** acetaminophen (Tylenol) suppository 650 mg, 650 mg, rectal, q4h PRN, Patel Galvan MD    albuterol 90 mcg/actuation inhaler 2 puff, 2 puff, inhalation, q6h PRN, Patel Galvan MD    cefTRIAXone (Rocephin) 1 g in dextrose (iso) IV 50 mL, 1 g, intravenous, q24h, Patel Galvan MD, Stopped at 07/27/25 2230    diphenhydrAMINE (BENADryl) injection 25 mg, 25 mg, intravenous, q6h PRN, Patel Galvan MD    DULoxetine (Cymbalta) DR capsule 60 mg, 60 mg, oral, Daily, Matthew Carroll MD    enoxaparin (Lovenox) syringe 40 mg, 40 mg, subcutaneous, q24h, Patel Galvan MD    gabapentin (Neurontin) capsule 800 mg, 800 mg, oral, TID, Patel Galvan MD, 800 mg at 07/28/25 0821    ketorolac (Toradol) injection 15 mg, 15 mg, intravenous, q6h PRN, Shakila Joya MD    losartan (Cozaar) tablet 50 mg, 50 mg, oral, Daily, Patel Galvan MD, 50 mg at 07/28/25 0821    melatonin tablet 5 mg, 5 mg, oral, Nightly PRN, Patel Galvan MD    ondansetron (Zofran) tablet 4 mg, 4 mg, oral, q8h PRN **OR** ondansetron (Zofran) injection 4 mg, 4 mg, intravenous, q8h PRN, Patel Galvan MD    oxyCODONE-acetaminophen (Percocet) 5-325 mg per tablet 1 tablet, 1 tablet, oral, q6h PRN, Patel Galvan MD, 1 tablet at 07/28/25 0601    pantoprazole (ProtoNix) EC tablet 40 mg, 40 mg, oral, Daily before breakfast, 40 mg at 07/28/25 0601 **OR** pantoprazole (Protonix) injection 40 mg, 40 mg,  intravenous, Daily before breakfast, Patel Galvan MD    pantoprazole (Protonix) injection 40 mg, 40 mg, intravenous, Daily, Jesús Montoya MD, 40 mg at 07/28/25 0821    polyethylene glycol (Glycolax, Miralax) packet 17 g, 17 g, oral, Daily PRN, Patel Galvan MD    rOPINIRole (Requip) tablet 1 mg, 1 mg, oral, TID PRN, Patel Galvan MD, 1 mg at 07/28/25 0601    sertraline (Zoloft) tablet 100 mg, 100 mg, oral, Daily, Patel Galvan MD, 100 mg at 07/28/25 0822

## 2025-07-29 ENCOUNTER — APPOINTMENT (OUTPATIENT)
Dept: RADIOLOGY | Facility: HOSPITAL | Age: 65
DRG: 690 | End: 2025-07-29
Payer: MEDICARE

## 2025-07-29 VITALS
TEMPERATURE: 96.4 F | BODY MASS INDEX: 27.73 KG/M2 | RESPIRATION RATE: 19 BRPM | DIASTOLIC BLOOD PRESSURE: 86 MMHG | HEIGHT: 63 IN | HEART RATE: 72 BPM | WEIGHT: 156.53 LBS | SYSTOLIC BLOOD PRESSURE: 165 MMHG | OXYGEN SATURATION: 93 %

## 2025-07-29 PROBLEM — M47.20 RADICULOPATHY DUE TO SPONDYLOSIS: Chronic | Status: ACTIVE | Noted: 2025-07-29

## 2025-07-29 PROCEDURE — 2500000004 HC RX 250 GENERAL PHARMACY W/ HCPCS (ALT 636 FOR OP/ED): Performed by: NURSE PRACTITIONER

## 2025-07-29 PROCEDURE — 2500000004 HC RX 250 GENERAL PHARMACY W/ HCPCS (ALT 636 FOR OP/ED): Performed by: ANESTHESIOLOGY

## 2025-07-29 PROCEDURE — 99231 SBSQ HOSP IP/OBS SF/LOW 25: CPT | Performed by: NURSE PRACTITIONER

## 2025-07-29 PROCEDURE — 2500000001 HC RX 250 WO HCPCS SELF ADMINISTERED DRUGS (ALT 637 FOR MEDICARE OP): Performed by: INTERNAL MEDICINE

## 2025-07-29 PROCEDURE — 72148 MRI LUMBAR SPINE W/O DYE: CPT | Performed by: RADIOLOGY

## 2025-07-29 PROCEDURE — 72148 MRI LUMBAR SPINE W/O DYE: CPT

## 2025-07-29 PROCEDURE — 99239 HOSP IP/OBS DSCHRG MGMT >30: CPT | Performed by: INTERNAL MEDICINE

## 2025-07-29 PROCEDURE — 2500000002 HC RX 250 W HCPCS SELF ADMINISTERED DRUGS (ALT 637 FOR MEDICARE OP, ALT 636 FOR OP/ED): Performed by: INTERNAL MEDICINE

## 2025-07-29 RX ORDER — KETOROLAC TROMETHAMINE 30 MG/ML
15 INJECTION, SOLUTION INTRAMUSCULAR; INTRAVENOUS EVERY 6 HOURS PRN
Status: DISCONTINUED | OUTPATIENT
Start: 2025-07-29 | End: 2025-07-29 | Stop reason: HOSPADM

## 2025-07-29 RX ORDER — METHYLPREDNISOLONE 4 MG/1
TABLET ORAL
Qty: 21 TABLET | Refills: 0 | Status: SHIPPED | OUTPATIENT
Start: 2025-07-29 | End: 2025-08-04

## 2025-07-29 RX ORDER — METHOCARBAMOL 500 MG/1
500 TABLET, FILM COATED ORAL EVERY 8 HOURS PRN
Qty: 15 TABLET | Refills: 0 | Status: SHIPPED | OUTPATIENT
Start: 2025-07-29 | End: 2025-08-03

## 2025-07-29 RX ADMIN — PANTOPRAZOLE SODIUM 40 MG: 40 TABLET, DELAYED RELEASE ORAL at 06:08

## 2025-07-29 RX ADMIN — GABAPENTIN 800 MG: 400 CAPSULE ORAL at 15:52

## 2025-07-29 RX ADMIN — OXYCODONE HYDROCHLORIDE AND ACETAMINOPHEN 1 TABLET: 5; 325 TABLET ORAL at 06:08

## 2025-07-29 RX ADMIN — ROPINIROLE 1 MG: 1 TABLET, FILM COATED ORAL at 14:00

## 2025-07-29 RX ADMIN — LOSARTAN POTASSIUM 50 MG: 50 TABLET, FILM COATED ORAL at 08:41

## 2025-07-29 RX ADMIN — OXYCODONE HYDROCHLORIDE AND ACETAMINOPHEN 1 TABLET: 5; 325 TABLET ORAL at 13:22

## 2025-07-29 RX ADMIN — METHYLPREDNISOLONE 20 MG: 4 TABLET ORAL at 08:57

## 2025-07-29 RX ADMIN — LORAZEPAM 0.5 MG: 0.5 TABLET ORAL at 11:34

## 2025-07-29 RX ADMIN — DULOXETINE 60 MG: 30 CAPSULE, DELAYED RELEASE ORAL at 08:45

## 2025-07-29 RX ADMIN — KETOROLAC TROMETHAMINE 15 MG: 30 INJECTION, SOLUTION INTRAMUSCULAR at 04:19

## 2025-07-29 RX ADMIN — ROPINIROLE 1 MG: 1 TABLET, FILM COATED ORAL at 06:08

## 2025-07-29 RX ADMIN — GABAPENTIN 800 MG: 400 CAPSULE ORAL at 08:40

## 2025-07-29 RX ADMIN — SERTRALINE HYDROCHLORIDE 100 MG: 50 TABLET, FILM COATED ORAL at 08:40

## 2025-07-29 ASSESSMENT — COGNITIVE AND FUNCTIONAL STATUS - GENERAL
MOBILITY SCORE: 24
DAILY ACTIVITIY SCORE: 24

## 2025-07-29 ASSESSMENT — PAIN SCALES - GENERAL
PAINLEVEL_OUTOF10: 4
PAINLEVEL_OUTOF10: 8
PAINLEVEL_OUTOF10: 5 - MODERATE PAIN
PAINLEVEL_OUTOF10: 7
PAINLEVEL_OUTOF10: 7
PAINLEVEL_OUTOF10: 4
PAINLEVEL_OUTOF10: 4
PAINLEVEL_OUTOF10: 0 - NO PAIN

## 2025-07-29 ASSESSMENT — PAIN - FUNCTIONAL ASSESSMENT
PAIN_FUNCTIONAL_ASSESSMENT: 0-10

## 2025-07-29 ASSESSMENT — PAIN DESCRIPTION - DESCRIPTORS: DESCRIPTORS: ACHING

## 2025-07-29 ASSESSMENT — PAIN DESCRIPTION - LOCATION
LOCATION: BACK

## 2025-07-29 NOTE — PROGRESS NOTES
Department of Internal Medicine  Gastroenterology  Progress note      Subjective  GI is following for epigastric pain and biliary dilation    No new events overnight per RN.  Patient reportedly tolerating a diet with no worsening abd pain. No N/V reported.  No gallstones noted on ultrasound.  Patient complaining more of back pain which she has chronically.      Current Medication  Current Medications[1]    Past Medical History  Active Ambulatory Problems     Diagnosis Date Noted    Type 2 diabetes mellitus without complication, without long-term current use of insulin 09/06/2023    Depression 09/06/2023    RLS (restless legs syndrome) 09/06/2023    Other chronic pain 09/06/2023    Vitamin D deficiency 09/06/2023    Abnormal EKG 10/01/2023    Abnormal nuclear stress test 10/01/2023    Absolute anemia 03/15/2016    Arthropathy, traumatic, multiple joints 10/01/2023    Bariatric surgery status 10/01/2023    Benign essential hypertension 10/01/2023    Carpal tunnel syndrome of left wrist 10/01/2023    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 10/01/2023    Elbow deformity 10/01/2023    Excessive daytime sleepiness 10/01/2023    Hyperlipidemia 10/01/2023    Impingement syndrome of left shoulder 03/29/2017    Insomnia 10/01/2023    Migraine with aura and without status migrainosus, not intractable 10/01/2023    Joint stiffness 10/01/2023    Postoperative malabsorption ([facility]-HCC) 10/01/2023    S/P gastric bypass 10/01/2023    Sleep apnea 10/01/2023    Sleep disturbance 05/15/2017    Spondylosis of lumbar region without myelopathy or radiculopathy 02/17/2017    Chronic back pain 10/01/2023    Chronic left shoulder pain 11/30/2016    Chronic pain 10/01/2023    Chronic left-sided low back pain with left-sided sciatica 05/15/2017    De Quervain's tenosynovitis 11/15/2023    Screening for condition 12/07/2023    Drug therapy 12/07/2023    Vitamin D deficiency, unspecified 03/06/2024    Bilateral leg  "weakness 06/14/2024    Bilateral leg pain 06/14/2024    Other specified disorders of tendon, left hand 09/16/2024    Trigger finger, left middle finger 09/16/2024    Palpitation 03/12/2025    Hemochromatosis 03/12/2025    Post-traumatic osteoarthritis of multiple joints 04/22/2025     Resolved Ambulatory Problems     Diagnosis Date Noted    Chronic osteomyelitis of right radius with draining sinus 10/01/2023    Major depressive disorder, single episode, severe without psychotic features (Multi) 03/15/2016    Psychological factors affecting morbid obesity (Multi) 10/01/2023     Past Medical History:   Diagnosis Date    Arthritis     Diabetes mellitus (Multi)     Fever, unspecified 03/21/2017    Headache     Hypertension 2000    Low back pain 2017    Osteomyelitis, unspecified 05/25/2022    Personal history of (healed) traumatic fracture 02/23/2022    Personal history of Methicillin resistant Staphylococcus aureus infection 09/18/2019    Personal history of other diseases of the circulatory system     Personal history of other diseases of the nervous system and sense organs     Personal history of other endocrine, nutritional and metabolic disease     Personal history of other mental and behavioral disorders     Personal history of other specified conditions 09/01/2017    Rotator cuff syndrome 2017    Tobacco use 07/26/2022    Trigger finger 2024       PHYSICAL EXAM  VS: /78 (Patient Position: Sitting)   Pulse 64 Comment: manual HR  64  Temp 36.6 °C (97.9 °F)   Resp 19   Ht 1.6 m (5' 2.99\")   Wt 71 kg (156 lb 8.4 oz)   SpO2 95%   BMI 27.73 kg/m²  Body mass index is 27.73 kg/m².  Physical Exam     DATA  Recent blood work and relevant radiology and endoscopic studies were reviewed and discussed with the patient   Results from last 7 days   Lab Units 07/27/25  0555   WBC AUTO x10*3/uL 6.2   RBC AUTO x10*6/uL 4.37   HEMOGLOBIN g/dL 14.2   HEMATOCRIT % 41.4   MCV fL 95   MCHC g/dL 34.3   RDW % 11.9   PLATELETS " AUTO x10*3/uL 204       Results from last 72 hours   Lab Units 07/27/25  0555   SODIUM mmol/L 142   POTASSIUM mmol/L 3.7   CHLORIDE mmol/L 107   CO2 mmol/L 28   BUN mg/dL 11   CREATININE mg/dL 0.62   CALCIUM mg/dL 8.7   PROTEIN TOTAL g/dL 6.3*   BILIRUBIN TOTAL mg/dL 0.5   ALK PHOS U/L 104   AST U/L 13   ALT U/L 12       Results from last 72 hours   Lab Units 07/26/25  1519   INR  1.1       Results from last 72 hours   Lab Units 07/26/25  1407   LIPASE U/L 22       RADIOLOGY REVIEW    RUQ US 7/28/25:  IMPRESSION:  Mildly dilated common bile duct measuring 9 mm in diameter similar to  MRCP. No biliary intraductal filling defect demonstrated.      Mildly distended gallbladder. No demonstrated gallstones or wall  thickening. Negative sonographic Davis's sign.      No focal hepatic abnormality demonstrated.      Sub visualization of the pancreas due to bowel gas.      MRCP pancreas 7/27/2025  IMPRESSION:  1.  Mild diffuse dilation of the common bile duct measuring 1 cm in  diameter as well as mild central intrahepatic biliary dilation. No  biliary intraductal filling defect.  2. Nonspecific mild diffuse dilation of the main pancreatic duct  measuring up to 5 mm in diameter at the pancreatic head level. No  focal pancreatic lesion.  3. Closplint of the mildly dilated common bile duct and main  pancreatic duct at the ampulla level. Ampullary stenosis is a  consideration.  4. Mildly distended gallbladder. Faint intermediate signal intensity  material in the dependent portion the gallbladder may represent  sludge and/or small gallstones.     ENDOSCOPIC REVIEW  NA     IMPRESSION/RECOMMENDATIONS  64-year-old female with biliary colic over the past 3 weeks, now with double duct sign on her CT.  The differential diagnosis includes gallstones with a CBD stone at the ampulla (however ultrasound demonstrates no gallstones) or a pancreatic cancer.  Her LFTs are normal.  Of note, she has had a Joana-en-Y gastric bypass.  An ERCP  would be challenging.       -Consider EUS/edge procedure outpatient to evaluate double duct sign seen on imaging  -Recommend general surgery consult  - Continue supportive care per primary team  - reportedly tolerating a diet          Plan discussed with Dr. Quiñones.  GI will sign off  (Electronically signed bySailaja Coronado, APRN-CNP on 2025 at 1:38 PM)          [1]   Current Facility-Administered Medications:     acetaminophen (Tylenol) tablet 650 mg, 650 mg, oral, q4h PRN, 650 mg at 25 0441 **OR** acetaminophen (Tylenol) oral liquid 650 mg, 650 mg, nasogastric tube, q4h PRN **OR** acetaminophen (Tylenol) suppository 650 mg, 650 mg, rectal, q4h PRN, Patel Galvan MD    albuterol 90 mcg/actuation inhaler 2 puff, 2 puff, inhalation, q6h PRN, Patel Galvan MD    diphenhydrAMINE (BENADryl) injection 25 mg, 25 mg, intravenous, q6h PRN, Patel Galvan MD    DULoxetine (Cymbalta) DR capsule 60 mg, 60 mg, oral, Daily, Matthew Carroll MD, 60 mg at 25 0845    enoxaparin (Lovenox) syringe 40 mg, 40 mg, subcutaneous, q24h, Patel Galvan MD, 40 mg at 25    gabapentin (Neurontin) capsule 800 mg, 800 mg, oral, TID, Patel Galvan MD, 800 mg at 25 0840    ketorolac (Toradol) injection 15 mg, 15 mg, intravenous, q6h PRN, Art Aguilar, MAURICE-CNP, 15 mg at 25 0419    losartan (Cozaar) tablet 50 mg, 50 mg, oral, Daily, Patel Galvan MD, 50 mg at 25 0841    melatonin tablet 5 mg, 5 mg, oral, Nightly PRN, Patel Galvan MD    methocarbamol (Robaxin) tablet 500 mg, 500 mg, oral, q8h PRN, Vanessa Larkin MD    [] methylPREDNISolone (Medrol) tablet 24 mg, 24 mg, oral, Once **FOLLOWED BY** [COMPLETED] methylPREDNISolone (Medrol) tablet 20 mg, 20 mg, oral, Once, 20 mg at 25 0857 **FOLLOWED BY** [START ON 2025] methylPREDNISolone (Medrol) tablet 16 mg, 16 mg, oral, Once **FOLLOWED BY** [START ON 2025] methylPREDNISolone (Medrol) tablet 12 mg, 12 mg, oral, Once  **FOLLOWED BY** [START ON 8/1/2025] methylPREDNISolone (Medrol) tablet 8 mg, 8 mg, oral, Once **FOLLOWED BY** [START ON 8/2/2025] methylPREDNISolone (Medrol) tablet 4 mg, 4 mg, oral, Once, Vanessa Larkin MD    ondansetron (Zofran) tablet 4 mg, 4 mg, oral, q8h PRN **OR** ondansetron (Zofran) injection 4 mg, 4 mg, intravenous, q8h PRN, Patel Galvan MD    oxyCODONE-acetaminophen (Percocet) 5-325 mg per tablet 1 tablet, 1 tablet, oral, q6h PRN, Patel Galvan MD, 1 tablet at 07/29/25 1322    pantoprazole (ProtoNix) EC tablet 40 mg, 40 mg, oral, Daily before breakfast, 40 mg at 07/29/25 0608 **OR** [DISCONTINUED] pantoprazole (Protonix) injection 40 mg, 40 mg, intravenous, Daily before breakfast, Patel Galvan MD    polyethylene glycol (Glycolax, Miralax) packet 17 g, 17 g, oral, Daily PRN, Patel Galvan MD    rOPINIRole (Requip) tablet 1 mg, 1 mg, oral, TID PRN, Patel Galvan MD, 1 mg at 07/29/25 0608    sertraline (Zoloft) tablet 100 mg, 100 mg, oral, Daily, Patel Galvan MD, 100 mg at 07/29/25 0840

## 2025-07-29 NOTE — NURSING NOTE
AVS printed and reviewed with patient. All belonging sent with patient. New medication discussed with patient.

## 2025-07-29 NOTE — CARE PLAN
The patient's goals for the shift include      The clinical goals for the shift include pain control until the end of the shift      Problem: Pain - Adult  Goal: Verbalizes/displays adequate comfort level or baseline comfort level  Outcome: Progressing     Problem: Safety - Adult  Goal: Free from fall injury  Outcome: Progressing     Problem: Discharge Planning  Goal: Discharge to home or other facility with appropriate resources  Outcome: Progressing

## 2025-07-29 NOTE — PROGRESS NOTES
Bridget Hannon is a 64 y.o. female on day 2 of admission presenting with Biliary pain.      Subjective   Back pain with radiculopathy       Objective     Last Recorded Vitals  /86   Pulse 72   Temp 35.8 °C (96.4 °F)   Resp 19   Wt 71 kg (156 lb 8.4 oz)   SpO2 93%   Intake/Output last 3 Shifts:    Intake/Output Summary (Last 24 hours) at 2025 1545  Last data filed at 2025 2240  Gross per 24 hour   Intake 50 ml   Output --   Net 50 ml       Admission Weight  Weight: 71.2 kg (157 lb) (25 1359)    Daily Weight  25 : 71 kg (156 lb 8.4 oz)    Image Results  MR lumbar spine wo IV contrast  Narrative: Interpreted By:  Ria Mcgee,   STUDY:  MR LUMBAR SPINE WO IV CONTRAST;  2025 1:06 pm      INDICATION:  Signs/Symptoms:Bilateral Lower Extremitties radiculopathy.      COMPARISON:  CT abdomen and pelvis 2025 and MRI lumbar spine 2018.      ACCESSION NUMBER(S):  [id number]      ORDERING CLINICIAN:  DUARTE GALVAN      TECHNIQUE:  Multi planar, multisequence images of the lumbar spine were acquired.      FINDINGS:  NUMBERIN lumbar-type vertebral bodies; the last well-formed disc  space is labeled L5-S1.      ALIGNMENT: Dextrocurvature of the lumbar spine. Left lateral  listhesis of L1-L2, right lateral listhesis of L2-L3 and L3-L4.  Slight grade 1 anterolisthesis of L4-L5.      VERTEBRAE: Vertebral body heights are maintained. No suspicious  osseous lesions. Modic type 1 degenerative endplate changes at L1-L2  and L3-L4      CONUS: The lower thoracic cord appears unremarkable. The conus  terminates at L2. The cauda equina is unremarkable.      SOFT TISSUES: Unremarkable.      DISC LEVELS:      T12-L1: Disc bulge, bilateral facet arthropathy and ligamentum flavum  thickening. No spinal canal or foraminal narrowing.      L1-2: Disc bulge, bilateral facet arthropathy and ligamentum flavum  thickening. No spinal canal or foraminal narrowing.      L2-3: Disc bulge, bilateral  facet arthropathy and ligamentum flavum  thickening. No spinal canal stenosis. Moderate left foraminal  stenosis.      L3-4: Disc bulge, bilateral facet arthropathy, ligamentum flavum  thickening, mild epidural lipomatosis. Mild spinal canal stenosis.  Moderate left foraminal stenosis.      L4-5: Grade 1 anterolisthesis. Disc bulge/uncovering of the disc,  marked bilateral facet arthropathy and ligamentum flavum thickening.  Mild spinal canal stenosis. Moderate right foraminal stenosis.      L5-S1: Disc bulge, bilateral facet arthropathy and ligamentum flavum  thickening. No spinal canal or foraminal narrowing.      OTHER: None.      Impression: 1. Diffuse degenerative changes throughout the lumbar spine. Mild  spinal canal stenosis at L3-L4 and L4-L5.  2. Moderate left L2-L3, left L3-L4, and right L4-L5 foraminal  stenosis.  3. Dextrocurvature of the lumbar spine. Left lateral listhesis of  L1-L2, right lateral listhesis of L2-L3 and L3-L4. Slight grade 1  anterolisthesis of L4-L5.      I personally reviewed the images/study and I agree with the findings  as stated.      MACRO:  None      Signed by: Ria Mcgee 7/29/2025 1:16 PM  Dictation workstation:   [id number]  US right upper quadrant  Narrative: Interpreted By:  Chay Garvey,   STUDY:  US RIGHT UPPER QUADRANT;  7/28/2025 6:24 pm      INDICATION:  Signs/Symptoms:Rule out cholelithiasis.          COMPARISON:  MRCP of 07/27/2025.      ACCESSION NUMBER(S):  [id number]      ORDERING CLINICIAN:  JONATHAN SILVA      TECHNIQUE:  Real-time sonographic evaluation of the right upper quadrant was  performed.      FINDINGS:  LIVER:  The liver is homogeneous in echotexture. No focal hepatic lesion is  identified.  Liver measures 16.6 cm in sagittal dimension.      GALLBLADDER:  Gallbladder is mildly distended. No gallstones are demonstrated.  Gallbladder wall is not thickened measuring 2 mm. Sonographic Davis  sign was negative.      BILIARY TREE:  Common bile duct is  mildly dilated measuring 9 mm in diameter. No  biliary intraductal filling defect demonstrated.      PANCREAS:  The pancreas is poorly visualized due to overlying bowel gas.      RIGHT KIDNEY:  Right kidney measures  10.7cm in length.  No right hydronephrosis is  seen.      Impression: Mildly dilated common bile duct measuring 9 mm in diameter similar to  MRCP. No biliary intraductal filling defect demonstrated.      Mildly distended gallbladder. No demonstrated gallstones or wall  thickening. Negative sonographic Davis's sign.      No focal hepatic abnormality demonstrated.      Sub visualization of the pancreas due to bowel gas.      MACRO:  None.      Signed by: Chay Garvey 7/29/2025 8:34 AM  Dictation workstation:   [id number]      Physical Exam    Relevant Results                            Assessment & Plan  Biliary pain    Type 2 diabetes mellitus without complication, without long-term current use of insulin    Benign essential hypertension    Dilated bile duct    Acute UTI (urinary tract infection)    I reviewed the patient lumbar MRI which basically shows foraminal stenosis at L3-4 and L4-5 with L4-5 anterolisthesis.  I discussed with the patient conservative treatment, interventional pain procedure and surgical consultation.  At this time I would like to get surgical consultation with Dr. Deleon first since any interventional steroid injection could postpone any surgical intervention.           Vanessa Larkin MD

## 2025-07-29 NOTE — CARE PLAN
The patient's goals for the shift include      The clinical goals for the shift include pt will have controlle pain throughout shift

## 2025-07-29 NOTE — DISCHARGE SUMMARY
Discharge Diagnosis  Biliary pain           Issues Requiring Follow-Up  Follow-up with PCP in a week.  Follow-up with GI in [facility]ospine in 2 weeks.    Discharge Meds     Medication List      START taking these medications     methocarbamol 500 mg tablet; Commonly known as: Robaxin; Take 1 tablet   (500 mg) by mouth every 8 hours if needed for muscle spasms for up to 5   days.   methylPREDNISolone 4 mg tablet; Commonly known as: Medrol; Take 6   tablets (24 mg) by mouth once daily for 1 day, THEN 5 tablets (20 mg) once   daily for 1 day, THEN 4 tablets (16 mg) once daily for 1 day, THEN 3   tablets (12 mg) once daily for 1 day, THEN 2 tablets (8 mg) once daily for   1 day, THEN 1 tablet (4 mg) once daily for 1 day.; Start taking on: July 29, 2025     CHANGE how you take these medications     losartan 50 mg tablet; Commonly known as: Cozaar; TAKE 1 TABLET BY MOUTH   ONCE  DAILY AS DIRECTED; What changed: additional instructions   rOPINIRole 1 mg tablet; Commonly known as: Requip; TAKE 1 TABLET BY   MOUTH UP TO 3  TIMES DAILY AS NEEDED FOR  RESTLESS LEG SYMPTOMS; What   changed: See the new instructions.     CONTINUE taking these medications     acetaminophen 325 mg tablet; Commonly known as: Tylenol   albuterol 90 mcg/actuation inhaler   DULoxetine 60 mg DR capsule; Commonly known as: Cymbalta; TAKE 1 CAPSULE   BY MOUTH DAILY  (DO NOT CRUSH OR CHEW)   ergocalciferol 1250 mcg (50,000 units) capsule; Commonly known as:   Vitamin D-2; Take 1 capsule 3 times a week with food, for example Monday,   Wednesday, Friday.   gabapentin 800 mg tablet; Commonly known as: Neurontin; TAKE 1 TABLET BY   MOUTH 3 TIMES  DAILY   lidocaine 5 % patch; Commonly known as: Lidoderm; Apply once daily to   affected area, leave there 12 hours, remove & discard patch within 12   hours or as directed by MD.   oxyCODONE-acetaminophen 5-325 mg tablet; Commonly known as: Percocet;   Take 1 tablet by mouth every 4 hours if needed for severe pain (7  - 10).   Do not fill before July 13, 2025.   sertraline 100 mg tablet; Commonly known as: Zoloft; Take 1 tablet (100   mg) by mouth once daily.     ASK your doctor about these medications     hydroCHLOROthiazide 25 mg tablet; Commonly known as: HYDRODiuril       Test Results Pending At Discharge  Pending Labs       No current pending labs.            Hospital Course   Bridget Hannon is a 64 y.o. female with past medical history of diabetes mellitus, hypertension, chronic back pain, hemochromatosis, RLS, chronic opioid use, depression and anxiety, Joana-en-Y gastric bypass, presenting with abdominal pain for for a few weeks. CT abdomen/pelvis obtained showed intrahepatic and extrahepatic biliary dilatation as well as dilated main pancreatic duct. MRCP was ordered showed mild diffuse dilation of the common bile duct also showed mild central intra hepatic biliary dilation no intraductal filling defect.  Also mild dilation of main pancreatic duct.  GI also saw the patient they recommend outpatient follow-up with GI for further evaluation.  The patient's abdominal pain resolved.  The patient also has chronic back pain.  Pain management was consulted.  MRI of the spine was done that showed aminal stenosis L3-L4 and L4-L5 and L4-L5 anterolisthesis.  Pain management recommended follow-up with orthospine in outpatient setting and conservative management at this time.  The patient otherwise stable for discharge with outpatient follow-up.    Pertinent Physical Exam At Time of Discharge  Physical Exam  HENT:      Head: Normocephalic and atraumatic.      Nose: Nose normal.      Mouth/Throat:      Mouth: Mucous membranes are dry.     Eyes:      Extraocular Movements: Extraocular movements intact.      Conjunctiva/sclera: Conjunctivae normal.       Cardiovascular:      Rate and Rhythm: Normal rate and regular rhythm.      Pulses: Normal pulses.      Heart sounds: Normal heart sounds.   Pulmonary:      Effort: Pulmonary  effort is normal.      Breath sounds: Normal breath sounds.   Abdominal:      General: Bowel sounds are normal.      Palpations: Abdomen is soft.     Musculoskeletal:         General: Normal range of motion.      Cervical back: Normal range of motion and neck supple.     Skin:     General: Skin is warm and dry.     Neurological:      Mental Status: She is alert. Mental status is at baseline.     Psychiatric:         Mood and Affect: Mood normal.         Outpatient Follow-Up  Future Appointments   Date Time Provider Department Center   8/21/2025  1:30 PM MAURICE [facility] [id number] [place]   9/11/2025  2:40 PM Aaron Saez DO [id number] [place]         Matthew Carroll MD

## 2025-08-02 DIAGNOSIS — G62.9 NEUROPATHY: ICD-10-CM

## 2025-08-04 RX ORDER — GABAPENTIN 800 MG/1
800 TABLET ORAL
Qty: 300 TABLET | Refills: 3 | Status: SHIPPED | OUTPATIENT
Start: 2025-08-04

## 2025-08-08 DIAGNOSIS — G89.29 OTHER CHRONIC PAIN: ICD-10-CM

## 2025-08-09 RX ORDER — OXYCODONE AND ACETAMINOPHEN 5; 325 MG/1; MG/1
1 TABLET ORAL EVERY 4 HOURS PRN
Qty: 180 TABLET | Refills: 0 | Status: SHIPPED | OUTPATIENT
Start: 2025-08-12 | End: 2025-09-11

## 2025-08-13 ENCOUNTER — APPOINTMENT (OUTPATIENT)
Dept: PRIMARY CARE | Facility: CLINIC | Age: 65
End: 2025-08-13
Payer: MEDICARE

## 2025-08-13 ENCOUNTER — TELEPHONE (OUTPATIENT)
Dept: GASTROENTEROLOGY | Facility: CLINIC | Age: 65
End: 2025-08-13

## 2025-08-13 VITALS
BODY MASS INDEX: 28 KG/M2 | OXYGEN SATURATION: 93 % | HEART RATE: 83 BPM | WEIGHT: 158 LBS | SYSTOLIC BLOOD PRESSURE: 166 MMHG | HEIGHT: 63 IN | DIASTOLIC BLOOD PRESSURE: 94 MMHG

## 2025-08-13 DIAGNOSIS — G89.29 CHRONIC BACK PAIN, UNSPECIFIED BACK LOCATION, UNSPECIFIED BACK PAIN LATERALITY: ICD-10-CM

## 2025-08-13 DIAGNOSIS — M54.9 CHRONIC BACK PAIN, UNSPECIFIED BACK LOCATION, UNSPECIFIED BACK PAIN LATERALITY: ICD-10-CM

## 2025-08-13 DIAGNOSIS — K83.8 DILATED BILE DUCT: ICD-10-CM

## 2025-08-13 DIAGNOSIS — K80.50 BILIARY PAIN: Primary | ICD-10-CM

## 2025-08-13 PROCEDURE — 3080F DIAST BP >= 90 MM HG: CPT | Performed by: FAMILY MEDICINE

## 2025-08-13 PROCEDURE — 3077F SYST BP >= 140 MM HG: CPT | Performed by: FAMILY MEDICINE

## 2025-08-13 PROCEDURE — 99214 OFFICE O/P EST MOD 30 MIN: CPT | Performed by: FAMILY MEDICINE

## 2025-08-13 PROCEDURE — 3008F BODY MASS INDEX DOCD: CPT | Performed by: FAMILY MEDICINE

## 2025-08-13 PROCEDURE — G2211 COMPLEX E/M VISIT ADD ON: HCPCS | Performed by: FAMILY MEDICINE

## 2025-08-13 PROCEDURE — 4010F ACE/ARB THERAPY RXD/TAKEN: CPT | Performed by: FAMILY MEDICINE

## 2025-08-13 PROCEDURE — G8433 SCR FOR DEP NOT CPT DOC RSN: HCPCS | Performed by: FAMILY MEDICINE

## 2025-08-14 ENCOUNTER — APPOINTMENT (OUTPATIENT)
Dept: GASTROENTEROLOGY | Facility: CLINIC | Age: 65
End: 2025-08-14
Payer: MEDICARE

## 2025-08-14 VITALS
DIASTOLIC BLOOD PRESSURE: 94 MMHG | SYSTOLIC BLOOD PRESSURE: 154 MMHG | OXYGEN SATURATION: 93 % | BODY MASS INDEX: 28.03 KG/M2 | WEIGHT: 158.2 LBS | HEIGHT: 63 IN

## 2025-08-14 DIAGNOSIS — K86.89 PANCREATIC DUCT DILATED (HHS-HCC): ICD-10-CM

## 2025-08-14 DIAGNOSIS — K83.8 INTRAHEPATIC BILE DUCT DILATION: ICD-10-CM

## 2025-08-14 DIAGNOSIS — Z09 HOSPITAL DISCHARGE FOLLOW-UP: ICD-10-CM

## 2025-08-14 DIAGNOSIS — R93.5 ABNORMAL CT OF THE ABDOMEN: ICD-10-CM

## 2025-08-14 DIAGNOSIS — R93.3 ABNORMAL MAGNETIC RESONANCE CHOLANGIOPANCREATOGRAPHY (MRCP): Primary | ICD-10-CM

## 2025-08-14 PROCEDURE — 3008F BODY MASS INDEX DOCD: CPT

## 2025-08-14 PROCEDURE — 3077F SYST BP >= 140 MM HG: CPT

## 2025-08-14 PROCEDURE — 3080F DIAST BP >= 90 MM HG: CPT

## 2025-08-14 PROCEDURE — G2211 COMPLEX E/M VISIT ADD ON: HCPCS

## 2025-08-14 PROCEDURE — 99214 OFFICE O/P EST MOD 30 MIN: CPT

## 2025-08-14 PROCEDURE — 4010F ACE/ARB THERAPY RXD/TAKEN: CPT

## 2025-08-14 ASSESSMENT — ENCOUNTER SYMPTOMS
CONSTIPATION: 0
COLOR CHANGE: 0
CONFUSION: 0
ABDOMINAL PAIN: 1
BLOOD IN STOOL: 0
DIARRHEA: 0
UNEXPECTED WEIGHT CHANGE: 0
FLANK PAIN: 0
TREMORS: 0
FEVER: 0
SEIZURES: 0
TROUBLE SWALLOWING: 0
CHOKING: 0
BRUISES/BLEEDS EASILY: 0
SHORTNESS OF BREATH: 0
VOICE CHANGE: 0
ABDOMINAL DISTENTION: 0
AGITATION: 0
JOINT SWELLING: 0
LIGHT-HEADEDNESS: 0
MYALGIAS: 0
COUGH: 0
APPETITE CHANGE: 0
ARTHRALGIAS: 0
VOMITING: 0
RECTAL PAIN: 0
WEAKNESS: 0
DIZZINESS: 0
PALPITATIONS: 0
NAUSEA: 0
HEMATURIA: 0
CHILLS: 0
NERVOUS/ANXIOUS: 0
ANAL BLEEDING: 0

## 2025-08-19 DIAGNOSIS — G89.29 OTHER CHRONIC PAIN: ICD-10-CM

## 2025-08-20 RX ORDER — DULOXETIN HYDROCHLORIDE 60 MG/1
60 CAPSULE, DELAYED RELEASE ORAL DAILY
Qty: 90 CAPSULE | Refills: 3 | Status: SHIPPED | OUTPATIENT
Start: 2025-08-20

## 2025-08-21 ENCOUNTER — APPOINTMENT (OUTPATIENT)
Dept: CARDIOLOGY | Facility: CLINIC | Age: 65
End: 2025-08-21
Payer: MEDICARE

## 2025-08-21 VITALS
WEIGHT: 158.1 LBS | HEIGHT: 62 IN | SYSTOLIC BLOOD PRESSURE: 132 MMHG | BODY MASS INDEX: 29.09 KG/M2 | DIASTOLIC BLOOD PRESSURE: 86 MMHG | HEART RATE: 72 BPM

## 2025-08-21 DIAGNOSIS — E78.5 HYPERLIPIDEMIA, UNSPECIFIED HYPERLIPIDEMIA TYPE: ICD-10-CM

## 2025-08-21 DIAGNOSIS — I10 BENIGN ESSENTIAL HYPERTENSION: Primary | ICD-10-CM

## 2025-08-21 DIAGNOSIS — R00.2 PALPITATION: ICD-10-CM

## 2025-08-21 DIAGNOSIS — Z76.89 ENCOUNTER TO ESTABLISH CARE: ICD-10-CM

## 2025-08-21 DIAGNOSIS — R07.89 OTHER CHEST PAIN: ICD-10-CM

## 2025-08-21 PROCEDURE — 3008F BODY MASS INDEX DOCD: CPT | Performed by: NURSE PRACTITIONER

## 2025-08-21 PROCEDURE — 99204 OFFICE O/P NEW MOD 45 MIN: CPT | Performed by: NURSE PRACTITIONER

## 2025-08-21 PROCEDURE — 3079F DIAST BP 80-89 MM HG: CPT | Performed by: NURSE PRACTITIONER

## 2025-08-21 PROCEDURE — 93000 ELECTROCARDIOGRAM COMPLETE: CPT | Performed by: NURSE PRACTITIONER

## 2025-08-21 PROCEDURE — 4010F ACE/ARB THERAPY RXD/TAKEN: CPT | Performed by: NURSE PRACTITIONER

## 2025-08-21 PROCEDURE — 3075F SYST BP GE 130 - 139MM HG: CPT | Performed by: NURSE PRACTITIONER

## 2025-08-21 RX ORDER — AMINOPHYLLINE 25 MG/ML
125 INJECTION, SOLUTION INTRAVENOUS ONCE AS NEEDED
OUTPATIENT
Start: 2025-08-21

## 2025-08-21 RX ORDER — METOPROLOL SUCCINATE 25 MG/1
25 TABLET, EXTENDED RELEASE ORAL DAILY
Qty: 30 TABLET | Refills: 11 | Status: SHIPPED | OUTPATIENT
Start: 2025-08-21 | End: 2026-08-21

## 2025-08-21 RX ORDER — NAPROXEN SODIUM 220 MG/1
81 TABLET, FILM COATED ORAL DAILY
Qty: 30 TABLET | Refills: 11 | Status: SHIPPED | OUTPATIENT
Start: 2025-08-21 | End: 2026-08-21

## 2025-08-21 RX ORDER — REGADENOSON 0.08 MG/ML
0.4 INJECTION, SOLUTION INTRAVENOUS
OUTPATIENT
Start: 2025-08-21

## 2025-08-21 ASSESSMENT — ENCOUNTER SYMPTOMS
PSYCHIATRIC NEGATIVE: 1
NEAR-SYNCOPE: 0
SHORTNESS OF BREATH: 0
ABDOMINAL PAIN: 0
ENDOCRINE NEGATIVE: 1
NAUSEA: 0
DIZZINESS: 0
WHEEZING: 0
DIARRHEA: 0
UNUSUAL HAIR DISTRIBUTION: 0
VISUAL HALOS: 0
ALLERGIC/IMMUNOLOGIC NEGATIVE: 1
NAIL CHANGES: 0
HEMOPTYSIS: 0
CHILLS: 0
DIAPHORESIS: 0
LIGHT-HEADEDNESS: 0
COUGH: 0
PALPITATIONS: 1
SPUTUM PRODUCTION: 0
CONSTIPATION: 0
HEMATOLOGIC/LYMPHATIC NEGATIVE: 1
CLAUDICATION: 0
POOR WOUND HEALING: 0
COLOR CHANGE: 0
DECREASED APPETITE: 0
SUSPICIOUS LESIONS: 0
SYNCOPE: 0
IRREGULAR HEARTBEAT: 0
NEUROLOGICAL NEGATIVE: 1
NUMBNESS: 0
MUSCULOSKELETAL NEGATIVE: 1
DYSPNEA ON EXERTION: 0
EYES NEGATIVE: 1
VOMITING: 0
FEVER: 0
ORTHOPNEA: 0
PND: 0
CONSTITUTIONAL NEGATIVE: 1

## 2025-08-29 ENCOUNTER — HOSPITAL ENCOUNTER (OUTPATIENT)
Dept: RADIOLOGY | Facility: CLINIC | Age: 65
Discharge: HOME | End: 2025-08-29
Payer: MEDICARE

## 2025-08-29 ENCOUNTER — OFFICE VISIT (OUTPATIENT)
Dept: ORTHOPEDIC SURGERY | Facility: CLINIC | Age: 65
End: 2025-08-29
Payer: MEDICARE

## 2025-08-29 DIAGNOSIS — M54.50 LUMBAR PAIN: ICD-10-CM

## 2025-08-29 DIAGNOSIS — M54.16 LUMBAR RADICULOPATHY: Primary | ICD-10-CM

## 2025-08-29 PROCEDURE — 99212 OFFICE O/P EST SF 10 MIN: CPT | Performed by: ORTHOPAEDIC SURGERY

## 2025-08-29 PROCEDURE — 72110 X-RAY EXAM L-2 SPINE 4/>VWS: CPT

## 2025-08-29 PROCEDURE — 4010F ACE/ARB THERAPY RXD/TAKEN: CPT | Performed by: ORTHOPAEDIC SURGERY

## 2025-08-29 PROCEDURE — 99215 OFFICE O/P EST HI 40 MIN: CPT | Performed by: ORTHOPAEDIC SURGERY

## 2025-09-11 ENCOUNTER — APPOINTMENT (OUTPATIENT)
Dept: PRIMARY CARE | Facility: CLINIC | Age: 65
End: 2025-09-11
Payer: MEDICARE

## 2025-09-22 ENCOUNTER — APPOINTMENT (OUTPATIENT)
Dept: GASTROENTEROLOGY | Facility: CLINIC | Age: 65
End: 2025-09-22
Payer: MEDICARE

## (undated) DEVICE — BANDAGE, COHESIVE, HAND TEAR, COFLEX, 2 X 5 YDS, LF

## (undated) DEVICE — SUTURE, ETHILON, 4-0, BLK, MONO, PS-2 18

## (undated) DEVICE — TOWEL PACK, STERILE, 4/PACK, BLUE

## (undated) DEVICE — DRESSING, NON-ADHERENT, 3 X 3 IN, STERILE

## (undated) DEVICE — DRAPE, SHEET, 17 X 23 IN

## (undated) DEVICE — GLOVE, SURGICAL, PROTEXIS PI MICRO, 7.5, PF, LF

## (undated) DEVICE — Device

## (undated) DEVICE — DRESSING, GAUZE, SUPER KERLIX, 6X6

## (undated) DEVICE — SOLUTION, IRRIGATION, STERILE WATER, 1000 ML, POUR BOTTLE

## (undated) DEVICE — SOLUTION, IRRIGATION, SODIUM CHLORIDE 0.9%, 1000 ML, POUR BOTTLE

## (undated) DEVICE — BANDAGE, GAUZE, 6 PLY, KERLIX, 4.5 IN X 4.1 YD, AMD, STERILE

## (undated) DEVICE — GLOVE, SURGICAL, PROTEXIS PI MICRO, 7.0, PF, LF

## (undated) DEVICE — SUTURE, ETHILON, 3-0, 18 IN, PS2, BLACK

## (undated) DEVICE — APPLICATOR, CHLORAPREP, W/ORANGE TINT, 26ML